# Patient Record
Sex: FEMALE | Race: WHITE | HISPANIC OR LATINO | Employment: FULL TIME | URBAN - METROPOLITAN AREA
[De-identification: names, ages, dates, MRNs, and addresses within clinical notes are randomized per-mention and may not be internally consistent; named-entity substitution may affect disease eponyms.]

---

## 2017-01-09 ENCOUNTER — CLINICAL SUPPORT (OUTPATIENT)
Dept: SPEECH THERAPY | Facility: HOSPITAL | Age: 27
End: 2017-01-09
Attending: STUDENT IN AN ORGANIZED HEALTH CARE EDUCATION/TRAINING PROGRAM
Payer: MEDICAID

## 2017-01-09 DIAGNOSIS — R53.1 LEFT-SIDED WEAKNESS: ICD-10-CM

## 2017-01-09 DIAGNOSIS — R26.89 BALANCE PROBLEMS: ICD-10-CM

## 2017-01-09 PROCEDURE — 97110 THERAPEUTIC EXERCISES: CPT

## 2017-01-09 NOTE — PROGRESS NOTES
TIME RECORD    Date:  01/09/2017    Start Time:  9:00  Stop Time:  10:05    PROCEDURES:    TIMED  Procedure Min.   Bike 10   TE 25   Neuro 15   Gait 15         UNTIMED  Procedure Min.             Total Timed Minutes:  55  Total Timed Units:  4  Total Untimed Units:  0  Charges Billed/# of units:  4TE      Progress/Current Status    Subjective:     Patient ID: Kadie Mccann is a 26 y.o. female.  Diagnosis:   1. Left-sided weakness     2. Balance problems       Pain: pt reports no complaints of pain, she states she has weakness and numbness in Left lateral hip and knee today. She is agreeable to PT session.     Objective:   Pt initiated session on stationary cycle x 10 min under PTA supervision. Pt then completed therex as per logged below 1;1 W/ PTA. Pt then instructed on neuro re ed x 15 min in standing in //bars: foam balance beam with FWD/LAT x 5 trials with use of 1 UE for balance only, pt then escorted to her vehicle due to reports of general fatigue: ambulated down 35 ft ramp with use of SPC, able to negotiate a curb with no LOB approx 200 ft total distance ambulated.   Date  1/9/17 12/28/16 12/23/16   VISIT 4 3 2   POC 2/19/17      MT - -- --   Long Sit HS - -- --   Gastroc Str.  -- --   Nu step  Bike   10'  Bike   10'  L3   10'    Bridges 2x12 B 2x10  2x10    SLR 2x12 B 2x10  2x10    SL Hip Add 2x12 B 2x10  2x10    SL Hip Abd 2x12 B 2x10  2x10    LAQs  -- --   Seated Hip Flex  -- --   Cybex   Leg Press 4.0   2x10    2.0 uni  2x12 4.0   2x10  --   TKE - -- --   Heel/toe raises  - -- --   Mini squats  2x10 -- 2x10    Hip abd - -- --   Hip Flex - -- --   Hip Ext - -- --   HS Curls - -- --   Knee Ext - -- --   Step Ups - -- --   Step Downs - -- --   Gait note note note   Balance  note note Note    Initials JA 1/6 MNB MNB       Assessment:     Pt completed today's session with no adverse reactions to session. She expressed feeling fatigued throughout standing activities requiring seated rest breaks.  Noted  some lateral swaying while returning to vehicle with use of SPC, but no LOB.     Patient Education/Response:   Cont HEP    Plans and Goals:   Pt to cont PT as per POC, advance standing balance activities as appropriate.   Short Term Goals= Long term goals (8 Weeks):    1. Independent with initial HEP.  2. Pt will score greater than or equal to 20/24 on the DGI test/assessment without use of AD placing patient in 10-20% impaired, limited, or restricted category demonstrating overall improved functional mobility and balance. [place] [place]  3. Pt will be able to safely perform and tolerate high level ADL's without LOB.   4. Able to ambulate community distances without use of assistive device without pain or gait deficits.  5. Pt will have MMT score of 5/5 in all major ms groups in L LE.  6. Pt will ambulate on TM x 10 minutes without use of UE support with 0 LOB at 2.0 mph with minimal gait deviations without LOB.  7. Pt will report 34% on the FOTO Functional Assessment placing the patient in the 20-40% impaired, limited, or restricted category indicating increased functional balance and mobility.

## 2017-01-11 ENCOUNTER — CLINICAL SUPPORT (OUTPATIENT)
Dept: SPEECH THERAPY | Facility: HOSPITAL | Age: 27
End: 2017-01-11
Attending: STUDENT IN AN ORGANIZED HEALTH CARE EDUCATION/TRAINING PROGRAM
Payer: MEDICAID

## 2017-01-11 DIAGNOSIS — R53.1 LEFT-SIDED WEAKNESS: ICD-10-CM

## 2017-01-11 DIAGNOSIS — R26.89 BALANCE PROBLEMS: ICD-10-CM

## 2017-01-11 PROCEDURE — 97110 THERAPEUTIC EXERCISES: CPT

## 2017-01-11 NOTE — PROGRESS NOTES
TIME RECORD    Date:  01/11/2017    Start Time:  9:02  Stop Time:  10:00    PROCEDURES:    TIMED  Procedure Min.   nustep 8   TE 20   Neuro Re Ed 25             UNTIMED  Procedure Min.             Total Timed Minutes:  53  Total Timed Units:  4  Total Untimed Units:  0  Charges Billed/# of units:  4 TE      Progress/Current Status    Subjective:     Patient ID: Kadie Mccann is a 26 y.o. female.  Diagnosis:   1. Left-sided weakness     2. Balance problems       Pain: pt agreeable to PT session. She reports no complaints of pain today.     Objective:     Pt initiated session on Nustep x 8 min for Pt then completed therex as per logged below 1;1 W/ PTA. Pt then instructed on neuro re ed x 25 min in standing in //bars: foam balance beam with FWD/LAT x 5 trials with use of 1 UE for balance only, 3 cone taps with use of 1 UE for balance x 5 trials B LE's each.   Date  1/11/17 1/9/17 12/28/16 12/23/16   VISIT 5 4 3 2   POC 2/19/17       MT - - -- --   Long Sit HS - - -- --   Gastroc Str.   -- --   Nu step  Nu step  8 min  Bike   10'  Bike   10'  L3   10'    Bridges 2x15 B 2x12 B 2x10  2x10    SLR 1# 2x10 B 2x12 B 2x10  2x10    SL Hip Add 1# 2x10 B 2x12 B 2x10  2x10    SL Hip Abd 1# 2x10 2x12 B 2x10  2x10    LAQs -  -- --   Seated Hip Flex -  -- --   Cybex   Leg Press 4.0   2x10    2.0 uni  2x12 4.0   2x10    2.0 uni  2x12 4.0   2x10  --   TKE  - -- --   Heel/toe raises   - -- --   Mini squats  2x10 2x10 -- 2x10    Hip abd - - -- --   Hip Flex - - -- --   Hip Ext - - -- --   HS Curls - - -- --   Knee Ext 15# 2x10 - -- --   Step Ups Blue x10  Fwd/ Lat - -- --   Step Downs - - -- --   Gait Note note note note   Balance  note note note Note    Initials JA 2/6 JA 1/6 MNB MNB     Assessment:     Pt completed session with no reports of pain. No episodes of increased fatigue or exhaustion throughout session.    Patient Education/Response:     Cont HEP    Plans and Goals:     Cont PT as per POC  Short Term Goals= Long term  goals (8 Weeks):    1. Independent with initial HEP.  2. Pt will score greater than or equal to 20/24 on the DGI test/assessment without use of AD placing patient in 10-20% impaired, limited, or restricted category demonstrating overall improved functional mobility and balance. (ode goal mobility CI)  3. Pt will be able to safely perform and tolerate high level ADL's without LOB.   4. Able to ambulate community distances without use of assistive device without pain or gait deficits.  5. Pt will have MMT score of 5/5 in all major ms groups in L LE.  6. Pt will ambulate on TM x 10 minutes without use of UE support with 0 LOB at 2.0 mph with minimal gait deviations without LOB.  7. Pt will report 34% on the FOTO Functional Assessment placing the patient in the 20-40% impaired, limited, or restricted category indicating increased functional balance and mobility.

## 2017-01-16 ENCOUNTER — TELEPHONE (OUTPATIENT)
Dept: SPEECH THERAPY | Facility: HOSPITAL | Age: 27
End: 2017-01-16

## 2017-01-18 ENCOUNTER — CLINICAL SUPPORT (OUTPATIENT)
Dept: SPEECH THERAPY | Facility: HOSPITAL | Age: 27
End: 2017-01-18
Attending: STUDENT IN AN ORGANIZED HEALTH CARE EDUCATION/TRAINING PROGRAM
Payer: MEDICAID

## 2017-01-18 DIAGNOSIS — R53.1 LEFT-SIDED WEAKNESS: ICD-10-CM

## 2017-01-18 DIAGNOSIS — R26.89 BALANCE PROBLEMS: ICD-10-CM

## 2017-01-18 PROCEDURE — 97110 THERAPEUTIC EXERCISES: CPT

## 2017-01-18 NOTE — PROGRESS NOTES
"TIME RECORD    Date:  01/18/2017    Start Time:  9:00  Stop Time:  10:05    PROCEDURES:    TIMED  Procedure Min.   Nu step 8 min    TE 25   Neuro 28             UNTIMED  Procedure Min.             Total Timed Minutes:  53  Total Timed Units:  4  Total Untimed Units:  0  Charges Billed/# of units:  4TE      Progress/Current Status    Subjective:     Patient ID: Kadie Mccann is a 26 y.o. female.  Diagnosis:   1. Left-sided weakness     2. Balance problems       Pain: 1 /10  Pt reports she fell over the weekend while pumping gas. She states she hurt her R knee, but is in minimal ache today.     Objective:     Pt initiated session on Nustep x 8 min for Pt then completed therex as per logged below 1;1 W/ PTA. Pt then instructed on neuro re ed x 28 min in standing in //bars: foam balance beam with FWD/LAT x 5 trials with use of 1 UE for balance only, 3 cone taps with use of 1 UE for balance x 5 trials B LE's each.     Date  1/18/17 1/11/17 1/9/17 12/28/16 12/23/16   VISIT 6 5 4 3 2   POC 2/19/17        MT - - - -- --   Long Sit HS - - - -- --   Gastroc Str. -   -- --   Nu step  Nu step  8 min  Nu step  8 min  Bike   10'  Bike   10'  L3   10'    Hip ext 1" 2x10 b       Bridges 2x15 2x15 B 2x12 B 2x10  2x10    SLR 1# 2x10 B 1# 2x10 B 2x12 B 2x10  2x10    SL Hip Add 1# 2x10 B 1# 2x10 B 2x12 B 2x10  2x10    SL Hip Abd 1# 2x10 B 1# 2x10 2x12 B 2x10  2x10    LAQs  -  -- --   Seated Hip Flex  -  -- --   Cybex   Leg Press 4.0   3x10    2.5  2x15  4.0   2x10    2.0 uni  2x12 4.0   2x10    2.0 uni  2x12 4.0   2x10  --   TKE   - -- --   Heel/toe raises    - -- --   Mini squats  2x10 2x10 2x10 -- 2x10    Hip abd  - - -- --   Hip Flex  - - -- --   Hip Ext  - - -- --   HS Curls  - - -- --   Knee Ext 15# 2x12 15# 2x10 - -- --   Step Ups Blue x10  Fwd/ Lat Blue x10  Fwd/ Lat - -- --   Step Downs  - - -- --   Gait Note Note note note note   Balance  Note note note note Note    Initials KARYN 3/6 KARYN 2/6 KARYN 1/6 MNB MNB       Assessment: "     Pt reports she has no new complaints post session. She expressed concerns about her condition and limitations with ambulation and balance. She required verbal encouragement to motivate during session today. Pt cooperative overall, some verbal instruction on postural awareness.     Patient Education/Response:     Cont HEP    Plans and Goals:     Cont PT to advance as appropriate  Short Term Goals= Long term goals (8 Weeks):    1. Independent with initial HEP.  2. Pt will score greater than or equal to 20/24 on the DGI test/assessment without use of AD placing patient in 10-20% impaired, limited, or restricted category demonstrating overall improved functional mobility and balance. ([facility] [place]  3. Pt will be able to safely perform and tolerate high level ADL's without LOB.   4. Able to ambulate community distances without use of assistive device without pain or gait deficits.  5. Pt will have MMT score of 5/5 in all major ms groups in L LE.  6. Pt will ambulate on TM x 10 minutes without use of UE support with 0 LOB at 2.0 mph with minimal gait deviations without LOB.  7. Pt will report 34% on the FOTO Functional Assessment placing the patient in the 20-40% impaired, limited, or restricted category indicating increased functional balance and mobility.

## 2017-01-19 ENCOUNTER — OFFICE VISIT (OUTPATIENT)
Dept: FAMILY MEDICINE | Facility: HOSPITAL | Age: 27
End: 2017-01-19
Payer: MEDICAID

## 2017-01-19 VITALS — DIASTOLIC BLOOD PRESSURE: 75 MMHG | RESPIRATION RATE: 20 BRPM | SYSTOLIC BLOOD PRESSURE: 114 MMHG | HEART RATE: 97 BPM

## 2017-01-19 DIAGNOSIS — J06.9 UPPER RESPIRATORY TRACT INFECTION, UNSPECIFIED TYPE: Primary | ICD-10-CM

## 2017-01-19 DIAGNOSIS — C71.9 ASTROCYTOMA, GRADE II: ICD-10-CM

## 2017-01-19 PROCEDURE — 99213 OFFICE O/P EST LOW 20 MIN: CPT | Performed by: STUDENT IN AN ORGANIZED HEALTH CARE EDUCATION/TRAINING PROGRAM

## 2017-01-19 RX ORDER — AZITHROMYCIN 250 MG/1
TABLET, FILM COATED ORAL
Qty: 5 TABLET | Refills: 0 | Status: SHIPPED | OUTPATIENT
Start: 2017-01-23 | End: 2017-01-29

## 2017-01-19 NOTE — PROGRESS NOTES
Subjective:       Patient ID: Kadie Mccann is a 26 y.o. female.    Chief Complaint: URI    URI    This is a new problem. The current episode started in the past 7 days. The problem has been unchanged. There has been no fever. Associated symptoms include congestion, coughing, headaches (maxiallry tenderness), rhinorrhea and sinus pain. Pertinent negatives include no abdominal pain, chest pain, ear pain, nausea, plugged ear sensation, sneezing, sore throat, swollen glands or wheezing. Treatments tried: EmergenC packets. The treatment provided no relief.     Review of Systems   Constitutional: Negative for activity change, appetite change, chills and fever.   HENT: Positive for congestion, postnasal drip, rhinorrhea and sinus pressure. Negative for ear pain, sneezing, sore throat and trouble swallowing.    Eyes: Negative for visual disturbance.   Respiratory: Positive for cough. Negative for shortness of breath and wheezing.    Cardiovascular: Negative for chest pain.   Gastrointestinal: Negative for abdominal pain and nausea.   Neurological: Positive for headaches (maxiallry tenderness).   All other systems reviewed and are negative.      Objective:        Vitals:    01/19/17 1416   BP: 114/75   Pulse: 97   Resp: 20   There is no height or weight on file to calculate BMI.      Physical Exam   Constitutional: She appears well-developed and well-nourished. No distress.   HENT:   Head: Normocephalic and atraumatic.   Right Ear: External ear normal.   Left Ear: External ear normal.   Nose: Mucosal edema and rhinorrhea present. Left sinus exhibits maxillary sinus tenderness.   Mouth/Throat: Mucous membranes are normal. No oropharyngeal exudate or posterior oropharyngeal erythema.   Cardiovascular: Normal rate and regular rhythm.    Pulmonary/Chest: Effort normal and breath sounds normal. No respiratory distress. She has no wheezes.   Nursing note and vitals reviewed.      Assessment:       1. Upper respiratory tract  infection, unspecified type    2. Astrocytoma, grade II        Plan:       Upper respiratory tract infection, unspecified type  -     azithromycin (ZITHROMAX Z-COREY) 250 MG tablet; Take two tablets on DAY 1. Then Take one table daily every day for 4 days  Dispense: 5 tablet; Refill: 0 (start on 01/23 if symptoms do not resolve)  - supportive care    Astrocytoma, grade II  - called MD Jhonatan as patient would like to receive a second opinion  - spoke to referral center: Referral [phone]  - will fax patient information with images, reports [facility] Fax: [phone] for review of her case to determine the eligibility of her referral      Return if symptoms worsen or fail to improve.

## 2017-01-25 ENCOUNTER — CLINICAL SUPPORT (OUTPATIENT)
Dept: SPEECH THERAPY | Facility: HOSPITAL | Age: 27
End: 2017-01-25
Attending: STUDENT IN AN ORGANIZED HEALTH CARE EDUCATION/TRAINING PROGRAM
Payer: MEDICAID

## 2017-01-25 DIAGNOSIS — R26.89 BALANCE PROBLEMS: ICD-10-CM

## 2017-01-25 DIAGNOSIS — R53.1 LEFT-SIDED WEAKNESS: ICD-10-CM

## 2017-01-25 PROCEDURE — 97110 THERAPEUTIC EXERCISES: CPT

## 2017-01-25 NOTE — PROGRESS NOTES
"TIME RECORD    Date:  01/25/2017    Start Time:  ***  Stop Time:  ***    PROCEDURES:    TIMED  Procedure Min.                         UNTIMED  Procedure Min.             Total Timed Minutes:  ***  Total Timed Units:  ***  Total Untimed Units:  ***  Charges Billed/# of units:  ***      Progress/Current Status    Subjective:     Patient ID: Kadie Mccann is a 26 y.o. female.  Diagnosis: No diagnosis found.  Pain: {PAIN 0-10:14197} /10  ***    Objective:     ***    Pt initiated session on Nustep x 8 min for Pt then completed therex as per logged below 1;1 W/ PTA. Pt then instructed on neuro re ed x 28 min in standing in //bars: foam balance beam with FWD/LAT x 5 trials with use of 1 UE for balance only, 3 cone taps with use of 1 UE for balance x 5 trials B LE's each.     Date  1/25/2017 1/18/17 1/11/17 1/9/17 12/28/16 12/23/16   VISIT 7 6 5 4 3 2   POC 2/19/17 --        MT -- - - - -- --   Long Sit HS -- - - - -- --   Gastroc Str. -- -   -- --   Nu step  Nu step  8 min  Nu step  8 min  Nu step  8 min  Bike   10'  Bike   10'  L3   10'    Hip ext 1" 2x10 b 1" 2x10 b       Bridges 2x15 2x15 2x15 B 2x12 B 2x10  2x10    SLR -- 1# 2x10 B 1# 2x10 B 2x12 B 2x10  2x10    SL Hip Add -- 1# 2x10 B 1# 2x10 B 2x12 B 2x10  2x10    SL Hip Abd -- 1# 2x10 B 1# 2x10 2x12 B 2x10  2x10    LAQs --  -  -- --   Seated Hip Flex --  -  -- --   Cybex   Leg Press  4.0   3x10    2.5  2x15  4.0   2x10    2.0 uni  2x12 4.0   2x10    2.0 uni  2x12 4.0   2x10  --   TKE    - -- --   Heel/toe raises     - -- --   Mini squats   2x10 2x10 2x10 -- 2x10    Hip abd 2 x 10 B  - - -- --   Hip Flex 2 x 10 B  - - -- --   Hip Ext 2 x 10 B RTC  - - -- --   HS Curls   - - -- --   Knee Ext  15# 2x12 15# 2x10 - -- --   Step Ups  Blue x10  Fwd/ Lat Blue x10  Fwd/ Lat - -- --   Step Downs   - - -- --   Gait  Note Note note note note   Balance   Note note note note Note    Initials  KARYN 3/6 KARYN 2/6 KARYN 1/6 MNB MNB       Assessment:     ***    Patient " Education/Response:     ***    Plans and Goals:       Cont PT to advance as appropriate  Short Term Goals= Long term goals (8 Weeks):    1. Independent with initial HEP.  2. Pt will score greater than or equal to 20/24 on the DGI test/assessment without use of AD placing patient in 10-20% impaired, limited, or restricted category demonstrating overall improved functional mobility and balance. ([facility] goal mobility CI)  3. Pt will be able to safely perform and tolerate high level ADL's without LOB.   4. Able to ambulate community distances without use of assistive device without pain or gait deficits.  5. Pt will have MMT score of 5/5 in all major ms groups in L LE.  6. Pt will ambulate on TM x 10 minutes without use of UE support with 0 LOB at 2.0 mph with minimal gait deviations without LOB.  7. Pt will report 34% on the FOTO Functional Assessment placing the patient in the 20-40% impaired, limited, or restricted category indicating increased functional balance and mobility.

## 2017-01-25 NOTE — PROGRESS NOTES
"TIME RECORD    Date:  01/25/2017    Start Time:  900  Stop Time:  1000    PROCEDURES:    TIMED  Procedure Min.   Nu step 8 min    TE 25   Neuro 25             UNTIMED  Procedure Min.             Total Timed Minutes:  53  Total Timed Units:  4  Total Untimed Units:  0  Charges Billed/# of units:  4TE      Progress/Current Status    Subjective:     Patient ID: Kadie Mccann is a 26 y.o. female.  Diagnosis:   1. Left-sided weakness     2. Balance problems       Pain: Patient reports some R knee soreness.   Objective:     Pt initiated session on Nustep x 8 min for endurance training with B UE/LE extremities for reciprocal motion of all limbs. Patient then completed therex as per logged below 1:1 W/ PTA. Pt then performed balance training min in standing in //bars: foam balance beam with FWD x 6  and LAT x 4 laps  with use of 1 UE for balance only, 3 cone taps with use of 1 UE for balance 2x 5  B LE's each, step to over 4 hurdles swapping leading foot x4 laps and sidestepping x 4 laps.    Date  1/25/17 1/18/17 1/11/17 1/9/17 12/28/16 12/23/16   VISIT 7 6 5 4 3 2   POC 2/19/17         MT - - - - -- --   Long Sit HS -- - - - -- --   Gastroc Str. - -   -- --   Nu step  Nu step  8 koki Nu step  8 min  Nu step  8 min  Bike   10'  Bike   10'  L3   10'    Hip ext  1" 2x10 b       Bridges 2x15 2x15 2x15 B 2x12 B 2x10  2x10    SLR - 1# 2x10 B 1# 2x10 B 2x12 B 2x10  2x10    SL Hip Add - 1# 2x10 B 1# 2x10 B 2x12 B 2x10  2x10    SL Hip Abd - 1# 2x10 B 1# 2x10 2x12 B 2x10  2x10    LAQs -  -  -- --   Seated Hip Flex   -  -- --   Cybex   Leg Press 4.0  2x15    2.5  2x15 4.0   3x10    2.5  2x15  4.0   2x10    2.0 uni  2x12 4.0   2x10    2.0 uni  2x12 4.0   2x10  --   TKE -   - -- --   Heel/toe raises  -   - -- --   Mini squats  NT 2x10 2x10 2x10 -- 2x10    Hip abd RTC 2x10 //  - - -- --   Hip Flex RTC  2x10 //  - - -- --   Hip Ext RTC   2x10 //  - - -- --   HS Curls   - - -- --   Knee Ext 15# 3x10 15# 2x12 15# 2x10 - -- --   Step " Ups Blue 2x10 ea fwd/lat Blue x10  Fwd/ Lat Blue x10  Fwd/ Lat - -- --   Step Downs   - - -- --   Gait  Note Note note note note   Balance  note Note note note note Note    Initials CS 4/6 JA 3/6 JA 2/6 JA 1/6 MNB MNB       Assessment:      Patient required verbal and tactile cueing for posture during session.  Patient fatigued at end of session.    Patient Education/Response:     Cont HEP    Plans and Goals:     Cont PT to advance as appropriate  Short Term Goals= Long term goals (8 Weeks):    1. Independent with initial HEP.  2. Pt will score greater than or equal to 20/24 on the DGI test/assessment without use of AD placing patient in 10-20% impaired, limited, or restricted category demonstrating overall improved functional mobility and balance. (ode goal mobility CI)  3. Pt will be able to safely perform and tolerate high level ADL's without LOB.   4. Able to ambulate community distances without use of assistive device without pain or gait deficits.  5. Pt will have MMT score of 5/5 in all major ms groups in L LE.  6. Pt will ambulate on TM x 10 minutes without use of UE support with 0 LOB at 2.0 mph with minimal gait deviations without LOB.  7. Pt will report 34% on the FOTO Functional Assessment placing the patient in the 20-40% impaired, limited, or restricted category indicating increased functional balance and mobility.

## 2017-02-01 ENCOUNTER — CLINICAL SUPPORT (OUTPATIENT)
Dept: SPEECH THERAPY | Facility: HOSPITAL | Age: 27
End: 2017-02-01
Attending: STUDENT IN AN ORGANIZED HEALTH CARE EDUCATION/TRAINING PROGRAM
Payer: MEDICAID

## 2017-02-01 DIAGNOSIS — R26.89 BALANCE PROBLEMS: ICD-10-CM

## 2017-02-01 DIAGNOSIS — R53.1 LEFT-SIDED WEAKNESS: ICD-10-CM

## 2017-02-01 PROCEDURE — 97110 THERAPEUTIC EXERCISES: CPT

## 2017-02-01 NOTE — PROGRESS NOTES
"TIME RECORD    Date:  02/01/2017    Start Time:  900  Stop Time:  1000    PROCEDURES:    TIMED  Procedure Min.   NuStep 9   Therex 25   NMR 20     Total Timed Minutes:  59  Total Timed Units:  4  Total Untimed Units:  -  Charges Billed/# of units:  4 TE      Progress/Current Status    Subjective:     Patient ID: Kadie Mccann is a 26 y.o. female.  Diagnosis:   1. Left-sided weakness     2. Balance problems       Pain: 3 /10  Reports left leg pain and very tired today.  "I did a lot of walking yesterday - I'm not sure why."    Objective:     Pt initiated session on Nustep S=6, L 4 x 9 min for endurance training with B UE/LE extremities for reciprocal motion of all limbs. Patient then completed therex as per logged below 1:1 W/ PTA with seated rest break after standing therex. Pt then performed balance training 15 min in standing in //bars: foam balance beam with FWD x 6  and LAT x 4 laps  with use of 1 UE for balance only. Trial static stand on foam with NBOS w/o UE support 30"x2, SLS 2x 30 on R, 2x20 on L with one UE support. Then NBOS with head turns R<>L, then up/down x 10 each.    Date  2/1/17 1/25/17 1/18/17 1/11/17 1/9/17 12/28/16 12/23/16   VISIT 8 7 6 5 4 3 2   POC 2/19/17          MT -- - - - - -- --   Long Sit HS -- -- - - - -- --   Gastroc Str. -- - -   -- --   Nu step  S=6, L4  9 min Nu step  8 koki Nu step  8 min  Nu step  8 min  Bike   10'  Bike   10'  L3   10'    Hip ext   1" 2x10 b       Bridges 2x15 2x15 2x15 2x15 B 2x12 B 2x10  2x10    SLR  - 1# 2x10 B 1# 2x10 B 2x12 B 2x10  2x10    SL Hip Add  - 1# 2x10 B 1# 2x10 B 2x12 B 2x10  2x10    SL Hip Abd  - 1# 2x10 B 1# 2x10 2x12 B 2x10  2x10    LAQs  -  -  -- --   Seated Hip Flex    -  -- --   Cybex   Leg Press 4.5   2x10    3.0  2x10 U 4.0  2x15    2.5  2x15 4.0   3x10    2.5  2x15  4.0   2x10    2.0 uni  2x12 4.0   2x10    2.0 uni  2x12 4.0   2x10  --   TKE  -   - -- --   Heel/toe raises   -   - -- --   Mini squats  1x10  NT 2x10 2x10 2x10 -- 2x10  "   Hip abd RTC // 2x12 B RTC 2x10 //  - - -- --   Hip Flex RTC //  2x12 B RTC  2x10 //  - - -- --   Hip Ext RTC //  2x12 B RTC   2x10 //  - - -- --   HS Curls    - - -- --   Knee Ext NT 15# 3x10 15# 2x12 15# 2x10 - -- --   Step Ups oot Blue 2x10 ea fwd/lat Blue x10  Fwd/ Lat Blue x10  Fwd/ Lat - -- --   Step Downs    - - -- --   Gait   Note Note note note note   Balance  Note note Note note note note Note    Initials DH 5/6 CS 4/6 JA 3/6 JA 2/6 JA 1/6 MNB MNB       Assessment:     Treatment slightly limited by patient complaints of fatigue and needing to take rest breaks.  Reports of left LE pain with attempts of SLS on same.  Able to tolerate with shorter time.    Patient Education/Response:     Patient educated to continue HEP.  Verbalized understanding.    Plans and Goals:     Cont PT per POC to advance as appropriate.    Short Term Goals= Long term goals (8 Weeks):    1. Independent with initial HEP.  2. Pt will score greater than or equal to 20/24 on the DGI test/assessment without use of AD placing patient in 10-20% impaired, limited, or restricted category demonstrating overall improved functional mobility and balance. (ode goal mobility CI)  3. Pt will be able to safely perform and tolerate high level ADL's without LOB.   4. Able to ambulate community distances without use of assistive device without pain or gait deficits.  5. Pt will have MMT score of 5/5 in all major ms groups in L LE.  6. Pt will ambulate on TM x 10 minutes without use of UE support with 0 LOB at 2.0 mph with minimal gait deviations without LOB.  7. Pt will report 34% on the FOTO Functional Assessment placing the patient in the 20-40% impaired, limited, or restricted category indicating increased functional balance and mobility.

## 2017-02-15 ENCOUNTER — CLINICAL SUPPORT (OUTPATIENT)
Dept: REHABILITATION | Facility: HOSPITAL | Age: 27
End: 2017-02-15
Attending: STUDENT IN AN ORGANIZED HEALTH CARE EDUCATION/TRAINING PROGRAM
Payer: MEDICAID

## 2017-02-15 DIAGNOSIS — R53.1 LEFT-SIDED WEAKNESS: ICD-10-CM

## 2017-02-15 DIAGNOSIS — R26.89 BALANCE PROBLEMS: ICD-10-CM

## 2017-02-15 PROCEDURE — 97110 THERAPEUTIC EXERCISES: CPT | Mod: PN

## 2017-02-15 NOTE — PROGRESS NOTES
"TIME RECORD    Date:  02/15/2017    Start Time:  0900  Stop Time:  1000    PROCEDURES:    TIMED  Procedure Min.   NMR 20   TE 30   Gait 10             UNTIMED  Procedure Min.             Total Timed Minutes:  60  Total Timed Units:  4  Total Untimed Units:  0  Charges Billed/# of units:  4 TE      Progress/Current Status    Subjective:     Patient ID: Kadie Mccann is a 26 y.o. female.  Diagnosis:   1. Left-sided weakness     2. Balance problems       Pain: 0 /10  Pt stated that she still feels like her L side if still. SHe stated that she is doing HEP but she still feels like her balance needs improvement so that she can get completely of the cane in the community.       Objective:     Pt presented to session with SPC.  She performed Scifit per log x 10' for Neuro re-ed and endurance training with B UE/LE extremities for reciprocal motion of all limbs.  Pt performed TE per log x 30'.  Pt performed LE assessment and balance test and TM gait training x 10'.     Date  2/15/17 2/1/17 1/25/17 1/18/17 1/11/17 1/9/17 12/28/16 12/23/16   VISIT 9 8 7 6 5 4 3 2   POC 4/15/17           MT -- -- - - - - -- --   Long Sit HS 3 x 30'' B -- -- - - - -- --   Gastroc Str. 3 x 30'' on stairs -- - -   -- --   Nu step  S=12, L1.5  8 min S=6, L4  9 min Nu step  8 koki Nu step  8 min  Nu step  8 min  Bike   10'  Bike   10'  L3   10'    Hip ext --   1" 2x10 b       Bridges -- 2x15 2x15 2x15 2x15 B 2x12 B 2x10  2x10    SLR --  - 1# 2x10 B 1# 2x10 B 2x12 B 2x10  2x10    SL Hip Add --  - 1# 2x10 B 1# 2x10 B 2x12 B 2x10  2x10    SL Hip Abd --  - 1# 2x10 B 1# 2x10 2x12 B 2x10  2x10    LAQs --  -  -  -- --   Seated Hip Flex --    -  -- --   Cybex   Leg Press 4.5   2x12    3.0  2x12 U 4.5   2x10    3.0  2x10 U 4.0  2x15    2.5  2x15 4.0   3x10    2.5  2x15  4.0   2x10    2.0 uni  2x12 4.0   2x10    2.0 uni  2x12 4.0   2x10  --   TKE --  -   - -- --   Heel/toe raises  2 x 10 B  -   - -- --   Mini squats  2 x 10  1x10  NT 2x10 2x10 2x10 -- " 2x10    Hip abd GTC // 2x12 B RTC // 2x12 B RTC 2x10 //  - - -- --   Hip Flex GTC // 2x12 B RTC //  2x12 B RTC  2x10 //  - - -- --   Hip Ext GTC // 2x12 B RTC //  2x12 B RTC   2x10 //  - - -- --   HS Curls --    - - -- --   Knee Ext NT NT 15# 3x10 15# 2x12 15# 2x10 - -- --   Step Ups NT oot Blue 2x10 ea fwd/lat Blue x10  Fwd/ Lat Blue x10  Fwd/ Lat - -- --   Step Downs NT    - - -- --   Gait On TM   Note Note note note note   Balance  See DGI Note note Note note note note Note    Initials FS DH 5/6 CS 4/6 JA 3/6 JA 2/6 JA 1/6 MNB MNB       Dynamic Gait Index - assessed without AD  1. Gait level surface -  2  2. Change in gait speed - 3  3. Gait with horizontal head turns - 3  4. Gait with vertical head turns - 2  5. Gait and pivot turn - 2  6. Step over obstacle - 3  7. Step around obstacle - 3  8. Steps - 2  Total 20/24    Lower Extremity Strength    RLE LLE   Hip Flexion: 5/5 5/5   Hip Extension:  5/5 5/5   Hip Abduction: 5/5 5/5   Knee Extension: 5/5 5/5   Knee Flexion: 5/5 5/5   Ankle Dorsiflexion: 5/5 5/5   Ankle Plantarflexion: 5/5 5/5       Assessment:     Pt has met some goals but is still using cane over 25% of the time and has decreased tolerance walking.  PT will update POC, see updated assessment.     Patient Education/Response:     CONT HEP with increase to GTB for TB exercises.    Plans and Goals:     SEE updated POC   Short Term Goals= Long term goals (8 Weeks):    1. Independent with initial HEP.  2. Pt will score greater than or equal to 20/24 on the DGI test/assessment without use of AD placing patient in 10-20% impaired, limited, or restricted category demonstrating overall improved functional mobility and balance. (Gcode goal mobility CI) MET without AD  3. Pt will be able to safely perform and tolerate high level ADL's without LOB. progressing  4. Able to ambulate community distances without use of assistive device without pain or gait deficits. NOT met still using SPC 25-40% of the time  5. Pt  will have MMT score of 5/5 in all major ms groups in L LE. MET  6. Pt will ambulate on TM x 10 minutes without use of UE support with 0 LOB at 2.0 mph with minimal gait deviations without LOB. Not met but progressing   7. Pt will report 34% on the FOTO Functional Assessment placing the patient in the 20-40% impaired, limited, or restricted category indicating increased functional balance and mobility.

## 2017-02-20 NOTE — PLAN OF CARE
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 Left-sided weakness     2. Balance problems       Treatment Diagnosis:  NF type 1, balance problems and weakness  Certification Period:  2/15/17 to 4/15/17  Precautions:  none  Visits from SOC:  10  Functional Level Prior to SOC:  Pt using SPC 75% of the time with frequent falls and LOB.  B LE weakness and decreased walking endurance.      Updated Assessment:  Pt has improved in strength,balance and overall function seen below. SHe has met most of her goals and is using her AD less with less falls.  She would cont to benefit from more PT to eliminate use of AD and to improve balance and walking tolerance.     Dynamic Gait Index - assessed without AD  1. Gait level surface -  2  2. Change in gait speed - 3  3. Gait with horizontal head turns - 3  4. Gait with vertical head turns - 2  5. Gait and pivot turn - 2  6. Step over obstacle - 3  7. Step around obstacle - 3  8. Steps - 2  Total 20/24    Lower Extremity Strength    RLE LLE   Hip Flexion: 5/5 5/5   Hip Extension:  5/5 5/5   Hip Abduction: 5/5 5/5   Knee Extension: 5/5 5/5   Knee Flexion: 5/5 5/5   Ankle Dorsiflexion: 5/5 5/5   Ankle Plantarflexion: 5/5 5/5       Previous Short Term Goals Status:     1. Independent with initial HEP.  2. Pt will score greater than or equal to 20/24 on the DGI test/assessment without use of AD placing patient in 10-20% impaired, limited, or restricted category demonstrating overall improved functional mobility and balance. (ode goal mobility CI) MET without AD  3. Pt will be able to safely perform and tolerate high level ADL's without LOB. progressing  4. Able to ambulate community distances without use of assistive device without pain or gait deficits. NOT met still using SPC 25-40% of the time  5. Pt will have MMT score of 5/5 in all major ms groups in L LE.  MET  6. Pt will ambulate on TM x 10 minutes without use of UE support with 0 LOB at 2.0 mph with minimal gait deviations without LOB. Not met but progressing   7. Pt will report 34% on the FOTO Functional Assessment placing the patient in the 20-40% impaired, limited, or restricted category indicating increased functional balance and mobility.  New Short Term Goals Status:   none  Long Term Goal Status:   continue per initial plan of care, STG's= LTGs  Reasons for Recertification of Therapy:   Expiration of prescription, end of POC script    Certification Period: 2/15/17 to 4/15/17  Recommended Treatment Plan: 1-2 times per week for 8 weeks: Gait Training, Locomotor Training, Manual Therapy, Moist Heat/ Ice, Neuromuscular Re-ed, Patient Education, Self Care, Therapeutic Activites and Therapeutic Exercise, modalities prn, ASTYM prn, kinesiotape prn, Functional Dry Needling prn     Other Recommendations: Pt would benefit from OT for UE coordination impairments and ataxia.        Therapist's Name: Mila Beard DPT   Date: 02/20/2017    I CERTIFY THE NEED FOR THESE SERVICES FURNISHED UNDER THIS PLAN OF TREATMENT AND WHILE UNDER MY CARE    Physician's comments: ________________________________________________________________________________________________________________________________________________      Physician's Name: ___________________________________

## 2017-02-24 ENCOUNTER — CLINICAL SUPPORT (OUTPATIENT)
Dept: REHABILITATION | Facility: HOSPITAL | Age: 27
End: 2017-02-24
Attending: STUDENT IN AN ORGANIZED HEALTH CARE EDUCATION/TRAINING PROGRAM
Payer: MEDICAID

## 2017-02-24 DIAGNOSIS — R26.89 BALANCE PROBLEMS: ICD-10-CM

## 2017-02-24 DIAGNOSIS — R53.1 LEFT-SIDED WEAKNESS: ICD-10-CM

## 2017-02-24 PROCEDURE — 97110 THERAPEUTIC EXERCISES: CPT | Mod: PN

## 2017-02-24 NOTE — PROGRESS NOTES
"TIME RECORD    Date:  02/24/2017    Start Time:  0850  Stop Time:  0948    PROCEDURES:    TIMED  Procedure Min.   TE 58                     UNTIMED  Procedure Min.             Total Timed Minutes:  58  Total Timed Units:  4  Total Untimed Units:  0  Charges Billed/# of units:  4 TE      Progress/Current Status    Subjective:     Patient ID: Kadie Mccann is a 26 y.o. female.  Diagnosis:   1. Left-sided weakness     2. Balance problems       Pain: 0 /10  Pt stated that she has been hearing a clicking sound in her hears     Objective:     Pt presented to session with SPC.  She performed Scifit per log x 10' for Neuro re-ed and endurance training with B UE/LE extremities for reciprocal motion of all limbs. Pt performed standing balance training including dot mat target stepping on command x 5', knee slap marching 3 x 20'.  PT traced pt's L foot for pee-lite fabrication insert for L shoe to decrease L LE ataxia and increase L foot proprioception. Pt performed gait training on TM x 12': 5' forward and 0.8mph, 2.5 minutes side and back at 0.4 mph all with UE support.  Pt performed TE per log x 20'.      Date  2/24/17 2/15/17 2/1/17 1/25/17 1/18/17 1/11/17 1/9/17 12/28/16 12/23/16   VISIT 10 9 8 7 6 5 4 3 2   POC 4/15/17            MT  -- -- - - - - -- --   Long Sit HS  3 x 30'' B -- -- - - - -- --   Gastroc Str. 3 x 30'' on stairs 3 x 30'' on stairs -- - -   -- --   Nu step  S=12, L1.3  10 min S=12, L1.5  8 min S=6, L4  9 min Nu step  8 koki Nu step  8 min  Nu step  8 min  Bike   10'  Bike   10'  L3   10'    Hip ext -- --   1" 2x10 b       Bridges -- -- 2x15 2x15 2x15 2x15 B 2x12 B 2x10  2x10    SLR -- --  - 1# 2x10 B 1# 2x10 B 2x12 B 2x10  2x10    SL Hip Add -- --  - 1# 2x10 B 1# 2x10 B 2x12 B 2x10  2x10    SL Hip Abd -- --  - 1# 2x10 B 1# 2x10 2x12 B 2x10  2x10    LAQs -- --  -  -  -- --   Seated Hip Flex -- --    -  -- --   Cybex   Leg Press OOT 4.5   2x12    3.0  2x12 U 4.5   2x10    3.0  2x10 U " 4.0  2x15    2.5  2x15 4.0   3x10    2.5  2x15  4.0   2x10    2.0 uni  2x12 4.0   2x10    2.0 uni  2x12 4.0   2x10  --   TKE -- --  -   - -- --   Heel/toe raises  OOT 2 x 10 B  -   - -- --   Mini squats  OOT 2 x 10  1x10  NT 2x10 2x10 2x10 -- 2x10    Hip abd GTC // 2x12 B GTC // 2x12 B RTC // 2x12 B RTC 2x10 //  - - -- --   Hip Flex OOT GTC // 2x12 B RTC //  2x12 B RTC  2x10 //  - - -- --   Hip Ext OOT GTC // 2x12 B RTC //  2x12 B RTC   2x10 //  - - -- --   HS Curls -- --    - - -- --   Knee Ext NT NT NT 15# 3x10 15# 2x12 15# 2x10 - -- --   Step Ups NT NT oot Blue 2x10 ea fwd/lat Blue x10  Fwd/ Lat Blue x10  Fwd/ Lat - -- --   Step Downs NT NT    - - -- --   Gait On TM On TM   Note Note note note note   Balance  See note See DGI Note note Note note note note Note    Initials FS FS DH 5/6 CS 4/6 JA 3/6 JA 2/6 JA 1/6 MNB MNB       Assessment:     Pt needs continued balance and gait trainin    Patient Education/Response:     CONT HEP    Plans and Goals:     SEE updated POC, continue balance and gait training with strengthening  Short Term Goals= Long term goals (8 Weeks):    1. Independent with initial HEP.  2. Pt will score greater than or equal to 20/24 on the DGI test/assessment without use of AD placing patient in 10-20% impaired, limited, or restricted category demonstrating overall improved functional mobility and balance. (Gcode goal mobility CI) MET without AD  3. Pt will be able to safely perform and tolerate high level ADL's without LOB. progressing  4. Able to ambulate community distances without use of assistive device without pain or gait deficits. NOT met still using SPC 25-40% of the time  5. Pt will have MMT score of 5/5 in all major ms groups in L LE. MET  6. Pt will ambulate on TM x 10 minutes without use of UE support with 0 LOB at 2.0 mph with minimal gait deviations without LOB. Not met but progressing   7. Pt will report 34% on the FOTO Functional Assessment placing the patient in the 20-40%  impaired, limited, or restricted category indicating increased functional balance and mobility.

## 2017-03-08 ENCOUNTER — CLINICAL SUPPORT (OUTPATIENT)
Dept: REHABILITATION | Facility: HOSPITAL | Age: 27
End: 2017-03-08
Attending: STUDENT IN AN ORGANIZED HEALTH CARE EDUCATION/TRAINING PROGRAM
Payer: MEDICAID

## 2017-03-08 DIAGNOSIS — R26.89 BALANCE PROBLEMS: ICD-10-CM

## 2017-03-08 DIAGNOSIS — R53.1 LEFT-SIDED WEAKNESS: ICD-10-CM

## 2017-03-08 PROCEDURE — 97110 THERAPEUTIC EXERCISES: CPT | Mod: PN

## 2017-03-08 NOTE — PROGRESS NOTES
"TIME RECORD    Date:  03/08/2017    Start Time:  0950  Stop Time:   1050    PROCEDURES:    TIMED  Procedure Min.   TE 60                     UNTIMED  Procedure Min.             Total Timed Minutes:  60  Total Timed Units:  4  Total Untimed Units:  0  Charges Billed/# of units:  4 TE      Progress/Current Status    Subjective:     Patient ID: Kadie Mccann is a 26 y.o. female.  Diagnosis:   1. Left-sided weakness     2. Balance problems       Pain: 0 /10  Pt stated that she was good today.      Objective:     Pt presented to session with SPC.  She performed Scifit per log x 10' for Neuro re-ed and endurance training with B UE/LE extremities for reciprocal motion of all limbs. Pt performed standing balance training including dot mat target stepping on command x 5', knee slap marching 3 x 20'. Pt performed TE per log x 20'. Pt performed gait training on TM x 12': 5' forward and 0.8mph with 1 UE support , 2.5 minutes side and back at 0.4 mph all with UE support.   Pt needed 3 seated rests throughout session. PT performed guarding due to L LE ataxia and dysmetria.    Date  3/8/17 2/24/17 2/15/17 2/1/17 1/25/17 1/18/17 1/11/17 1/9/17 12/28/16 12/23/16   VISIT 11 10 9 8 7 6 5 4 3 2   POC 4/15/17             MT --  -- -- - - - - -- --   Long Sit HS --  3 x 30'' B -- -- - - - -- --   Gastroc Str. NT 3 x 30'' on stairs 3 x 30'' on stairs -- - -   -- --   Nu step  S=10, L1.3  10 min S=12, L1.3  10 min S=12, L1.5  8 min S=6, L4  9 min Nu step  8 koki Nu step  8 min  Nu step  8 min  Bike   10'  Bike   10'  L3   10'    Hip ext -- -- --   1" 2x10 b       Bridges -- -- -- 2x15 2x15 2x15 2x15 B 2x12 B 2x10  2x10    SLR -- -- --  - 1# 2x10 B 1# 2x10 B 2x12 B 2x10  2x10    SL Hip Add -- -- --  - 1# 2x10 B 1# 2x10 B 2x12 B 2x10  2x10    SL Hip Abd -- -- --  - 1# 2x10 B 1# 2x10 2x12 B 2x10  2x10    LAQs -- -- --  -  -  -- --   Seated Hip Flex -- -- --    -  -- --   Cybex   Leg Press 4.5   2x12    3.0  2x12 U OOT 4.5 "   2x12    3.0  2x12 U 4.5   2x10    3.0  2x10 U 4.0  2x15    2.5  2x15 4.0   3x10    2.5  2x15  4.0   2x10    2.0 uni  2x12 4.0   2x10    2.0 uni  2x12 4.0   2x10  --   TKE -- -- --  -   - -- --   Heel/toe raises  2 x 10 B OOT 2 x 10 B  -   - -- --   Mini squats  2 x 10 without UE OOT 2 x 10  1x10  NT 2x10 2x10 2x10 -- 2x10    Hip abd GTC // 2x12 B GTC // 2x12 B GTC // 2x12 B RTC // 2x12 B RTC 2x10 //  - - -- --   Hip Flex GTC // 2x12 B OOT GTC // 2x12 B RTC //  2x12 B RTC  2x10 //  - - -- --   Hip Ext GTC // 2x12 B OOT GTC // 2x12 B RTC //  2x12 B RTC   2x10 //  - - -- --   HS Curls -- -- --    - - -- --   Knee Ext NT NT NT NT 15# 3x10 15# 2x12 15# 2x10 - -- --   Step Ups NT NT NT oot Blue 2x10 ea fwd/lat Blue x10  Fwd/ Lat Blue x10  Fwd/ Lat - -- --   Step Downs NT NT NT    - - -- --   Gait On TM On TM On TM   Note Note note note note   Balance  See note See note See DGI Note note Note note note note Note    Initials FS FS FS DH 5/6 CS 4/6 JA 3/6 JA 2/6 JA 1/6 MNB MNB       Assessment:     Pt presented to session without cane she stated she is feeling more and more secure without it.      Patient Education/Response:     CONT HEP    Plans and Goals:     SEE updated POC, continue balance and gait training with strengthening  Short Term Goals= Long term goals (8 Weeks):    1. Independent with initial HEP.  2. Pt will score greater than or equal to 20/24 on the DGI test/assessment without use of AD placing patient in 10-20% impaired, limited, or restricted category demonstrating overall improved functional mobility and balance. (Gcode goal mobility CI) MET without AD  3. Pt will be able to safely perform and tolerate high level ADL's without LOB. progressing  4. Able to ambulate community distances without use of assistive device without pain or gait deficits. NOT met still using SPC 25-40% of the time  5. Pt will have MMT score of 5/5 in all major ms groups in L LE. MET  6. Pt will ambulate on TM x 10 minutes without  use of UE support with 0 LOB at 2.0 mph with minimal gait deviations without LOB. Not met but progressing   7. Pt will report 34% on the FOTO Functional Assessment placing the patient in the 20-40% impaired, limited, or restricted category indicating increased functional balance and mobility.

## 2017-03-15 ENCOUNTER — CLINICAL SUPPORT (OUTPATIENT)
Dept: REHABILITATION | Facility: HOSPITAL | Age: 27
End: 2017-03-15
Attending: STUDENT IN AN ORGANIZED HEALTH CARE EDUCATION/TRAINING PROGRAM
Payer: MEDICAID

## 2017-03-15 DIAGNOSIS — R26.89 BALANCE PROBLEMS: ICD-10-CM

## 2017-03-15 DIAGNOSIS — R53.1 LEFT-SIDED WEAKNESS: ICD-10-CM

## 2017-03-15 PROCEDURE — 97110 THERAPEUTIC EXERCISES: CPT | Mod: PN

## 2017-03-15 NOTE — PROGRESS NOTES
TIME RECORD    Date:  03/15/2017    Start Time:  1000  Stop Time:   1100    PROCEDURES:    TIMED  Procedure Min.   NMR 30   Gait 30                 UNTIMED  Procedure Min.             Total Timed Minutes:  60  Total Timed Units:  4  Total Untimed Units:  0  Charges Billed/# of units:  4 TE      Progress/Current Status    Subjective:     Patient ID: Kadie Mccann is a 26 y.o. female.  Diagnosis:   1. Left-sided weakness     2. Balance problems       Pain: 0 /10  Pt stated that she has a minor fall this weekend chasing after her young son with no injury.  She stated that she was able to go to the mall and shop for an entire afternoon for the 1st time in a long time which was an improvement.      Objective:     Pt presented to session without SPC.  She performed Scifit per log x 30' for Neuro re-ed and endurance training with B UE/LE extremities for reciprocal motion of all limbs. While pt performed NMR on Sci-fit PT fabricated Dynamic foot insert for L shoe to replace insert in shoe, total fabrication time 30'.   After pt fit with insert she did walking trial x 5 lap in gym with insert in L shoe to increase L LE sensory input.  Pt expressed feeling of shoe filling full but no report of pain.  PT removed insert and pt walked 200 ft without insert and had 3 LOB and then insert redonned and pt walked 200 ft. Pt walked 300 extra feet to ensure comfort of wear with 0 LOB.  Session ended with hips 3 ways at end of session.  Pt had no AD entire session.      Date  3/15/17 3/8/17 2/24/17 2/15/17 2/1/17 1/25/17 1/18/17 1/11/17 1/9/17 12/28/16 12/23/16   VISIT 12 11 10 9 8 7 6 5 4 3 2   POC 4/15/17              MT -- --  -- -- - - - - -- --   Long Sit HS -- --  3 x 30'' B -- -- - - - -- --   Gastroc Str. NT NT 3 x 30'' on stairs 3 x 30'' on stairs -- - -   -- --   Nu step  S=10, L1.5  10 min f/b L1 x 20' S=10, L1.3  10 min S=12, L1.3  10 min S=12, L1.5  8 min S=6, L4  9 min Nu step  8 koki Nu step  8 min  Nu step  8 min   "Bike   10'  Bike   10'  L3   10'    Hip ext -- -- -- --   1" 2x10 b       Bridges -- -- -- -- 2x15 2x15 2x15 2x15 B 2x12 B 2x10  2x10    SLR -- -- -- --  - 1# 2x10 B 1# 2x10 B 2x12 B 2x10  2x10    SL Hip Add -- -- -- --  - 1# 2x10 B 1# 2x10 B 2x12 B 2x10  2x10    SL Hip Abd -- -- -- --  - 1# 2x10 B 1# 2x10 2x12 B 2x10  2x10    LAQs -- -- -- --  -  -  -- --   Seated Hip Flex -- -- -- --    -  -- --   Cybex   Leg Press NT 4.5   2x12    3.0  2x12 U OOT 4.5   2x12    3.0  2x12 U 4.5   2x10    3.0  2x10 U 4.0  2x15    2.5  2x15 4.0   3x10    2.5  2x15  4.0   2x10    2.0 uni  2x12 4.0   2x10    2.0 uni  2x12 4.0   2x10  --   TKE NT -- -- --  -   - -- --   Heel/toe raises  NT 2 x 10 B OOT 2 x 10 B  -   - -- --   Mini squats  NT 2 x 10 without UE OOT 2 x 10  1x10  NT 2x10 2x10 2x10 -- 2x10    Hip abd GTC // 2x12 B GTC // 2x12 B GTC // 2x12 B GTC // 2x12 B RTC // 2x12 B RTC 2x10 //  - - -- --   Hip Flex GTC // 2x12 B GTC // 2x12 B OOT GTC // 2x12 B RTC //  2x12 B RTC  2x10 //  - - -- --   Hip Ext GTC // 2x12 B GTC // 2x12 B OOT GTC // 2x12 B RTC //  2x12 B RTC   2x10 //  - - -- --   HS Curls -- -- -- --    - - -- --   Knee Ext NT NT NT NT NT 15# 3x10 15# 2x12 15# 2x10 - -- --   Step Ups NT NT NT NT oot Blue 2x10 ea fwd/lat Blue x10  Fwd/ Lat Blue x10  Fwd/ Lat - -- --   Step Downs NT NT NT NT    - - -- --   Gait See note On TM On TM On TM   Note Note note note note   Balance  -- See note See note See DGI Note note Note note note note Note    Initials FS FS FS FS DH 5/6 CS 4/6 JA 3/6 JA 2/6 JA 1/6 MNB MNB         Assessment:     Pt showed visibly improved gait pattern and stability in L LE with dynamic foot insert in shoe.  She even reported "it is making me walk better and my foot is not turning or going out to the side anymore".  Pt will continue to wear insert as long as it is not causing pain to improve L LE proprioception and sensory input during gait.     Patient Education/Response:     CONT HEP    Plans and Goals: "       SEE updated POC, continue balance and gait training with strengthening  Short Term Goals= Long term goals (8 Weeks):    1. Independent with initial HEP.  2. Pt will score greater than or equal to 20/24 on the DGI test/assessment without use of AD placing patient in 10-20% impaired, limited, or restricted category demonstrating overall improved functional mobility and balance. (Gcode goal mobility CI) MET without AD  3. Pt will be able to safely perform and tolerate high level ADL's without LOB. progressing  4. Able to ambulate community distances without use of assistive device without pain or gait deficits. NOT met still using SPC 25-40% of the time  5. Pt will have MMT score of 5/5 in all major ms groups in L LE. MET  6. Pt will ambulate on TM x 10 minutes without use of UE support with 0 LOB at 2.0 mph with minimal gait deviations without LOB. Not met but progressing   7. Pt will report 34% on the FOTO Functional Assessment placing the patient in the 20-40% impaired, limited, or restricted category indicating increased functional balance and mobility.

## 2017-03-24 ENCOUNTER — CLINICAL SUPPORT (OUTPATIENT)
Dept: REHABILITATION | Facility: HOSPITAL | Age: 27
End: 2017-03-24
Attending: STUDENT IN AN ORGANIZED HEALTH CARE EDUCATION/TRAINING PROGRAM
Payer: MEDICAID

## 2017-03-24 DIAGNOSIS — R26.89 BALANCE PROBLEMS: ICD-10-CM

## 2017-03-24 DIAGNOSIS — R53.1 LEFT-SIDED WEAKNESS: ICD-10-CM

## 2017-03-24 PROCEDURE — 97110 THERAPEUTIC EXERCISES: CPT | Mod: PN

## 2017-03-24 NOTE — PROGRESS NOTES
"TIME RECORD    Date:  03/24/2017    Start Time:  0900  Stop Time:  0953    PROCEDURES:    TIMED  Procedure Min.   TE 53                     UNTIMED  Procedure Min.             Total Timed Minutes:  53  Total Timed Units:  4  Total Untimed Units:  0  Charges Billed/# of units:  4 TE      Progress/Current Status    Subjective:     Patient ID: Kadie Mccann is a 26 y.o. female.  Diagnosis:   1. Left-sided weakness     2. Balance problems       Pain: 0 /10  Pt stated that she was feeling down today but ready to keep pushing forward.     Objective:     Session initated with gait training on TM x 11 minutes total (forward x 5' at 0.8 mph, side stepping and backward walking x 2 minutes each with 0.4 mph with UE support for all.  Pt performed developmental sequencing: including tall kneel with overhead lift x 10 and chops B x 10 ea way f/b 1/2 kneel with punches x 1' B, tall to 1/2 kneel transShe performed Scifit per log x 15' for Neuro re-ed and endurance training with B UE/LE extremities for reciprocal motion of all limbs.     Date  3/23/17 3/15/17 3/8/17 2/24/17 2/15/17 2/1/17 1/25/17 1/18/17 1/11/17 1/9/17 12/28/16 12/23/16   VISIT 13 12 11 10 9 8 7 6 5 4 3 2   POC 4/15/17 --              MT -- -- --  -- -- - - - - -- --   Long Sit HS -- -- --  3 x 30'' B -- -- - - - -- --   Gastroc Str. NT NT NT 3 x 30'' on stairs 3 x 30'' on stairs -- - -   -- --   Nu step  S=13, L1.5  15 min S=10, L1.5  10 min f/b L1 x 20' S=10, L1.3  10 min S=12, L1.3  10 min S=12, L1.5  8 min S=6, L4  9 min Nu step  8 koki Nu step  8 min  Nu step  8 min  Bike   10'  Bike   10'  L3   10'    Hip ext -- -- -- -- --   1" 2x10 b       Bridges -- -- -- -- -- 2x15 2x15 2x15 2x15 B 2x12 B 2x10  2x10    SLR -- -- -- -- --  - 1# 2x10 B 1# 2x10 B 2x12 B 2x10  2x10    SL Hip Add -- -- -- -- --  - 1# 2x10 B 1# 2x10 B 2x12 B 2x10  2x10    SL Hip Abd -- -- -- -- --  - 1# 2x10 B 1# 2x10 2x12 B 2x10  2x10    LAQs -- -- -- -- --  -  -  -- --   Seated Hip Flex -- " -- -- -- --    -  -- --   Cybex   Leg Press NT NT 4.5   2x12    3.0  2x12 U OOT 4.5   2x12    3.0  2x12 U 4.5   2x10    3.0  2x10 U 4.0  2x15    2.5  2x15 4.0   3x10    2.5  2x15  4.0   2x10    2.0 uni  2x12 4.0   2x10    2.0 uni  2x12 4.0   2x10  --   TKE NT NT -- -- --  -   - -- --   Heel/toe raises  NT NT 2 x 10 B OOT 2 x 10 B  -   - -- --   Mini squats  NT NT 2 x 10 without UE OOT 2 x 10  1x10  NT 2x10 2x10 2x10 -- 2x10    Hip abd NT GTC // 2x12 B GTC // 2x12 B GTC // 2x12 B GTC // 2x12 B RTC // 2x12 B RTC 2x10 //  - - -- --   Hip Flex NT GTC // 2x12 B GTC // 2x12 B OOT GTC // 2x12 B RTC //  2x12 B RTC  2x10 //  - - -- --   Hip Ext NT GTC // 2x12 B GTC // 2x12 B OOT GTC // 2x12 B RTC //  2x12 B RTC   2x10 //  - - -- --   HS Curls -- -- -- -- --    - - -- --   Knee Ext NT NT NT NT NT NT 15# 3x10 15# 2x12 15# 2x10 - -- --   Step Ups NT NT NT NT NT oot Blue 2x10 ea fwd/lat Blue x10  Fwd/ Lat Blue x10  Fwd/ Lat - -- --   Step Downs NT NT NT NT NT    - - -- --   Gait See note on TM See note On TM On TM On TM   Note Note note note note   Dev seq See note              Balance  -- -- See note See note See DGI Note note Note note note note Note    Initials  FS FS FS FS DH 5/6 CS 4/6 JA 3/6 JA 2/6 JA 1/6 MNB MNB       Assessment:     Pt tolerated session fairly well but fatigued quickly with new developmental sequencing training.    Patient Education/Response:     CONT HEP    Plans and Goals:     SEE updated POC, continue balance and gait training with strengthening  Short Term Goals= Long term goals (8 Weeks):    1. Independent with initial HEP.  2. Pt will score greater than or equal to 20/24 on the DGI test/assessment without use of AD placing patient in 10-20% impaired, limited, or restricted category demonstrating overall improved functional mobility and balance. (Gcode goal mobility CI) MET without AD  3. Pt will be able to safely perform and tolerate high level ADL's without LOB. progressing  4. Able to ambulate  community distances without use of assistive device without pain or gait deficits. NOT met still using SPC 25-40% of the time  5. Pt will have MMT score of 5/5 in all major ms groups in L LE. MET  6. Pt will ambulate on TM x 10 minutes without use of UE support with 0 LOB at 2.0 mph with minimal gait deviations without LOB. Not met but progressing   7. Pt will report 34% on the FOTO Functional Assessment placing the patient in the 20-40% impaired, limited, or restricted category indicating increased functional balance and mobility.

## 2017-03-28 ENCOUNTER — CLINICAL SUPPORT (OUTPATIENT)
Dept: REHABILITATION | Facility: HOSPITAL | Age: 27
End: 2017-03-28
Attending: STUDENT IN AN ORGANIZED HEALTH CARE EDUCATION/TRAINING PROGRAM
Payer: MEDICAID

## 2017-03-28 DIAGNOSIS — R53.1 LEFT-SIDED WEAKNESS: ICD-10-CM

## 2017-03-28 DIAGNOSIS — R26.89 BALANCE PROBLEMS: ICD-10-CM

## 2017-03-28 PROCEDURE — 97110 THERAPEUTIC EXERCISES: CPT | Mod: PN

## 2017-03-28 NOTE — PROGRESS NOTES
"TIME RECORD    Date:  03/28/2017    Start Time:  0855  Stop Time:  0950    PROCEDURES:    TIMED  Procedure Min.   TE 55                     UNTIMED  Procedure Min.             Total Timed Minutes:  55  Total Timed Units:  4  Total Untimed Units:  0  Charges Billed/# of units:  4 TE      Progress/Current Status    Subjective:     Patient ID: Kadie Mccann is a 26 y.o. female.  Diagnosis:   1. Left-sided weakness     2. Balance problems       Pain: 3 /10  Pt stated that her L quad was hurting the last 2 days.      Objective:       Session initated with gait training on TM x 12 minutes total (forward x 6' at 0.8 mph with 1 UE support, side stepping and backward walking x 2 minutes each with 0.4 mph with B UE support.  Pt performed physio ball sitting balance and trunk stability training with overhead lift x 20 and chops B x 10 ea way.  Pt performed TE per log x 30' with PT 1:1 including stretches in standing with stairs for UE support. She performed Scifit per log x 10' for Neuro re-ed and endurance training with B UE/LE extremities for reciprocal motion of all limbs.       Date  3/28/17 3/23/17 3/15/17 3/8/17 2/24/17 2/15/17 2/1/17 1/25/17 1/18/17 1/11/17 1/9/17 12/28/16 12/23/16   VISIT 14 13 12 11 10 9 8 7 6 5 4 3 2   POC 4/15/17 -- --              MT -- -- -- --  -- -- - - - - -- --   Quad str 2 x 30'' on stairs               Long Sit HS 2 x 30'' on stairs -- -- --  3 x 30'' B -- -- - - - -- --   Gastroc Str. 3 x 30'' on stairs NT NT NT 3 x 30'' on stairs 3 x 30'' on stairs -- - -   -- --   Nu step  S=13, L1.5  10 min S=13, L1.5  15 min S=10, L1.5  10 min f/b L1 x 20' S=10, L1.3  10 min S=12, L1.3  10 min S=12, L1.5  8 min S=6, L4  9 min Nu step  8 koki Nu step  8 min  Nu step  8 min  Bike   10'  Bike   10'  L3   10'    Hip ext -- -- -- -- -- --   1" 2x10 b       Bridges -- -- -- -- -- -- 2x15 2x15 2x15 2x15 B 2x12 B 2x10  2x10    SLR -- -- -- -- -- --  - 1# 2x10 B 1# 2x10 B 2x12 B 2x10  2x10    SL Hip Add -- " -- -- -- -- --  - 1# 2x10 B 1# 2x10 B 2x12 B 2x10  2x10    SL Hip Abd -- -- -- -- -- --  - 1# 2x10 B 1# 2x10 2x12 B 2x10  2x10    LAQs -- -- -- -- -- --  -  -  -- --   Balance on physioball See note -- -- -- -- --    -  -- --   Cybex   Leg Press next NT NT 4.5   2x12    3.0  2x12 U OOT 4.5   2x12    3.0  2x12 U 4.5   2x10    3.0  2x10 U 4.0  2x15    2.5  2x15 4.0   3x10    2.5  2x15  4.0   2x10    2.0 uni  2x12 4.0   2x10    2.0 uni  2x12 4.0   2x10  --   TKE Next with RTB on L NT NT -- -- --  -   - -- --   Heel/toe raises  2 x 15 B NT NT 2 x 10 B OOT 2 x 10 B  -   - -- --   Mini squats  2 x 10 without UE  NT NT 2 x 10 without UE OOT 2 x 10  1x10  NT 2x10 2x10 2x10 -- 2x10    Hip abd GTC // 2x12 B NT GTC // 2x12 B GTC // 2x12 B GTC // 2x12 B GTC // 2x12 B RTC // 2x12 B RTC 2x10 //  - - -- --   Hip Flex GTC // 2x12 B NT GTC // 2x12 B GTC // 2x12 B OOT GTC // 2x12 B RTC //  2x12 B RTC  2x10 //  - - -- --   Hip Ext GTC // 2x12 B NT GTC // 2x12 B GTC // 2x12 B OOT GTC // 2x12 B RTC //  2x12 B RTC   2x10 //  - - -- --   HS Curls -- -- -- -- -- --    - - -- --   Knee Ext NT NT NT NT NT NT NT 15# 3x10 15# 2x12 15# 2x10 - -- --   Step Ups NT NT NT NT NT NT oot Blue 2x10 ea fwd/lat Blue x10  Fwd/ Lat Blue x10  Fwd/ Lat - -- --   Step Downs NT NT NT NT NT NT    - - -- --   Gait See note TM See note on TM See note On TM On TM On TM   Note Note note note note   Dev seq NT See note              Balance  On TM -- -- See note See note See DGI Note note Note note note note Note    Initials FS  FS FS FS FS DH 5/6 CS 4/6 JA 3/6 JA 2/6 JA 1/6 MNB MNB     Assessment:     Pt had better session today with mixture of gait, dynamic sitting balance on ball.      Patient Education/Response:     CONT HEP    Plans and Goals:       SEE updated POC, continue balance and gait training with strengthening  Short Term Goals= Long term goals (8 Weeks):    1. Independent with initial HEP.  2. Pt will score greater than or equal to 20/24 on the DGI  test/assessment without use of AD placing patient in 10-20% impaired, limited, or restricted category demonstrating overall improved functional mobility and balance. (ode goal mobility CI) MET without AD  3. Pt will be able to safely perform and tolerate high level ADL's without LOB. progressing  4. Able to ambulate community distances without use of assistive device without pain or gait deficits. NOT met still using SPC 25-40% of the time  5. Pt will have MMT score of 5/5 in all major ms groups in L LE. MET  6. Pt will ambulate on TM x 10 minutes without use of UE support with 0 LOB at 2.0 mph with minimal gait deviations without LOB. Not met but progressing   7. Pt will report 34% on the FOTO Functional Assessment placing the patient in the 20-40% impaired, limited, or restricted category indicating increased functional balance and mobility.

## 2017-04-04 ENCOUNTER — OFFICE VISIT (OUTPATIENT)
Dept: FAMILY MEDICINE | Facility: HOSPITAL | Age: 27
End: 2017-04-04
Attending: FAMILY MEDICINE
Payer: MEDICAID

## 2017-04-04 VITALS
HEIGHT: 59 IN | BODY MASS INDEX: 29.42 KG/M2 | WEIGHT: 145.94 LBS | SYSTOLIC BLOOD PRESSURE: 113 MMHG | HEART RATE: 84 BPM | DIASTOLIC BLOOD PRESSURE: 77 MMHG

## 2017-04-04 DIAGNOSIS — R53.1 LEFT-SIDED WEAKNESS: ICD-10-CM

## 2017-04-04 DIAGNOSIS — C71.9 ASTROCYTOMA, GRADE II: ICD-10-CM

## 2017-04-04 DIAGNOSIS — Z30.41 ORAL CONTRACEPTIVE USE: ICD-10-CM

## 2017-04-04 DIAGNOSIS — R26.89 BALANCE PROBLEMS: Primary | ICD-10-CM

## 2017-04-04 PROCEDURE — 99214 OFFICE O/P EST MOD 30 MIN: CPT | Performed by: STUDENT IN AN ORGANIZED HEALTH CARE EDUCATION/TRAINING PROGRAM

## 2017-04-04 RX ORDER — LEVONORGESTREL AND ETHINYL ESTRADIOL 6-5-10
1 KIT ORAL DAILY
Qty: 28 TABLET | Refills: 11 | Status: SHIPPED | OUTPATIENT
Start: 2017-04-04 | End: 2019-06-25 | Stop reason: SDUPTHER

## 2017-04-04 RX ORDER — GENTAMICIN SULFATE 3 MG/ML
1 SOLUTION/ DROPS OPHTHALMIC 3 TIMES DAILY
Refills: 1 | COMMUNITY
Start: 2017-01-12 | End: 2017-04-04

## 2017-04-04 RX ORDER — DIAZEPAM 10 MG/1
TABLET ORAL
Refills: 0 | COMMUNITY
Start: 2017-03-23 | End: 2017-04-04

## 2017-04-04 NOTE — PROGRESS NOTES
"Subjective:       Patient ID: Kadie Mccann is a 26 y.o. female.    Chief Complaint: check-up and Contraception    HPI Comments: 25 yo with Astrocytoma GRADE II who presents for request for extension of PT/OT. States she has had improvement but would like to have more sessions as she feels she is not at her baseline. She filled out a form for medical release from [facility] in [place] to send over to our clinic. Pt would like to see [facility] for 2nd opinion for her Astrocytoma.     Pt also would like refills on her OCP. She notices less menorrhagia, less acne, and less dysmenorrhea when she is on her OCP.  She is currently not sexually active.     Review of Systems   Constitutional: Negative for chills and fever.   Respiratory: Negative for cough and shortness of breath.    Cardiovascular: Negative for chest pain.   Gastrointestinal: Negative for abdominal pain, constipation, diarrhea, nausea and vomiting.   Musculoskeletal: Positive for arthralgias and myalgias.   Neurological: Positive for weakness and numbness.       Objective:        Vitals:    04/04/17 0912   BP: 113/77   Pulse: 84   Weight: 66.2 kg (145 lb 15.1 oz)   Height: 4' 11" (1.499 m)   Body mass index is 29.48 kg/(m^2).      Physical Exam   Constitutional: She appears well-developed and well-nourished. No distress.   Wearing corrective glasses   Eyes: Conjunctivae are normal.   Pulmonary/Chest: No respiratory distress.   Abdominal: Soft. Bowel sounds are normal. She exhibits no distension.   Neurological: She is alert.   Left upper extremity weakness 3/5, right upper extremity strength 5/5   Nursing note and vitals reviewed.      Assessment:       1. Balance problems    2. Left-sided weakness    3. Astrocytoma, grade II    4. Oral contraceptive use        Plan:       Balance problems  -     Ambulatory Referral to Physical/Occupational Therapy  -     Ambulatory Referral to Physical/Occupational Therapy    Left-sided weakness  -  "    Ambulatory Referral to Physical/Occupational Therapy  -     Ambulatory Referral to Physical/Occupational Therapy    Astrocytoma, grade II  -     Ambulatory Referral to Physical/Occupational Therapy  -     Ambulatory Referral to Physical/Occupational Therapy    Oral contraceptive use  -     levonorgestrel-ethinyl estradiol (TRIVORA, 28,) 50-30 (6)/75-40 (5)/125-30(10) per tablet; Take 1 tablet by mouth once daily.  Dispense: 28 tablet; Refill: 11    - on long distance flights, preferablly recommend to sit in aisle sit to be able to get up and walk often; calf exercises  - educated not to smoke while on OCPs as it increases risk of clots , pt is a non-smoker, verbalizes understanding      Return in about 4 weeks (around 5/2/2017), or pain follow-up.

## 2017-04-05 ENCOUNTER — CLINICAL SUPPORT (OUTPATIENT)
Dept: REHABILITATION | Facility: HOSPITAL | Age: 27
End: 2017-04-05
Attending: STUDENT IN AN ORGANIZED HEALTH CARE EDUCATION/TRAINING PROGRAM
Payer: MEDICAID

## 2017-04-05 DIAGNOSIS — R53.1 LEFT-SIDED WEAKNESS: ICD-10-CM

## 2017-04-05 DIAGNOSIS — R26.89 BALANCE PROBLEMS: ICD-10-CM

## 2017-04-05 PROCEDURE — 97110 THERAPEUTIC EXERCISES: CPT | Mod: PN

## 2017-04-05 NOTE — PROGRESS NOTES
"TIME RECORD    Date:  04/05/2017    Start Time:  10:04 am  Stop Time:  10:53 am     PROCEDURES:    TIMED  Procedure Min.   Gait training 12'   TE 37'                 UNTIMED  Procedure Min.             Total Timed Minutes:  49'  Total Timed Units:  3  Total Untimed Units:  0  Charges Billed/# of units:  3 TE      Progress/Current Status    Subjective:     Patient ID: Kadie Mccann is a 26 y.o. female.  Diagnosis:   1. Left-sided weakness     2. Balance problems       Pain:   Pt stated that she has noticed improvement in her balance and strength.     Objective:     Session initated with gait training on TM x 12 minutes total (forward x 6' at 0.8 mph with 1 UE support, side stepping and backward walking x 2 minutes each with 0.4 mph with B UE support.   Pt performed TE per log x 37' with PT 1:1 including stretches in standing with stairs for UE support.     Date  4/5/17 3/28/17 3/23/17 3/15/17 3/8/17 2/24/17 2/15/17 2/1/17 1/25/17 1/18/17 1/11/17 1/9/17 12/28/16 12/23/16   VISIT 15 14 13 12 11 10 9 8 7 6 5 4 3 2   POC 4/15/17 - -- --              MT - -- -- -- --  -- -- - - - - -- --   Quad str 2x30" on stairs 2 x 30'' on stairs               Long Sit HS 2x30" on stairs 2 x 30'' on stairs -- -- --  3 x 30'' B -- -- - - - -- --   Gastroc Str. 3x30" stairs 3 x 30'' on stairs NT NT NT 3 x 30'' on stairs 3 x 30'' on stairs -- - -   -- --   Nu step  declined S=13, L1.5  10 min S=13, L1.5  15 min S=10, L1.5  10 min f/b L1 x 20' S=10, L1.3  10 min S=12, L1.3  10 min S=12, L1.5  8 min S=6, L4  9 min Nu step  8 koki Nu step  8 min  Nu step  8 min  Bike   10'  Bike   10'  L3   10'    Hip ext - -- -- -- -- -- --   1" 2x10 b       Bridges - -- -- -- -- -- -- 2x15 2x15 2x15 2x15 B 2x12 B 2x10  2x10    SLR - -- -- -- -- -- --  - 1# 2x10 B 1# 2x10 B 2x12 B 2x10  2x10    SL Hip Add - -- -- -- -- -- --  - 1# 2x10 B 1# 2x10 B 2x12 B 2x10  2x10    SL Hip Abd - -- -- -- -- -- --  - 1# 2x10 B 1# 2x10 2x12 B 2x10  2x10    LAQs - -- -- " -- -- -- --  -  -  -- --   Balance on physioball declined See note -- -- -- -- --    -  -- --   Cybex   Leg Press 4.5 2x12 DL    3.0 2x12 SL next NT NT 4.5   2x12    3.0  2x12 U OOT 4.5   2x12    3.0  2x12 U 4.5   2x10    3.0  2x10 U 4.0  2x15    2.5  2x15 4.0   3x10    2.5  2x15  4.0   2x10    2.0 uni  2x12 4.0   2x10    2.0 uni  2x12 4.0   2x10  --   TKE 2x10 RTB L Next with RTB on L NT NT -- -- --  -   - -- --   Heel/toe raises  2x15 B 2 x 15 B NT NT 2 x 10 B OOT 2 x 10 B  -   - -- --   Mini squats  2x10 with fingertips 2 x 10 without UE  NT NT 2 x 10 without UE OOT 2 x 10  1x10  NT 2x10 2x10 2x10 -- 2x10    Hip abd GTC // 2x12 B GTC // 2x12 B NT GTC // 2x12 B GTC // 2x12 B GTC // 2x12 B GTC // 2x12 B RTC // 2x12 B RTC 2x10 //  - - -- --   Hip Flex GTC // 2x12 B GTC // 2x12 B NT GTC // 2x12 B GTC // 2x12 B OOT GTC // 2x12 B RTC //  2x12 B RTC  2x10 //  - - -- --   Hip Ext GTC 2x12 B GTC // 2x12 B NT GTC // 2x12 B GTC // 2x12 B OOT GTC // 2x12 B RTC //  2x12 B RTC   2x10 //  - - -- --   HS Curls - -- -- -- -- -- --    - - -- --   Knee Ext - NT NT NT NT NT NT NT 15# 3x10 15# 2x12 15# 2x10 - -- --   Step Ups - NT NT NT NT NT NT oot Blue 2x10 ea fwd/lat Blue x10  Fwd/ Lat Blue x10  Fwd/ Lat - -- --   Step Downs - NT NT NT NT NT NT    - - -- --   Gait See note on TM See note TM See note on TM See note On TM On TM On TM   Note Note note note note   Dev seq - NT See note              Balance  On TM On TM -- -- See note See note See DGI Note note Note note note note Note    Initials CK FS  FS FS FS FS DH 5/6 CS 4/6 JA 3/6 JA 2/6 JA 1/6 MNB MNB       Assessment:     Pt received multiple rest breaks during therapy session today. Pt declined to participate in balance on PB therex and Nu step today due to feeling fatigued today.     Patient Education/Response:     Continue with HEP     Plans and Goals:     SEE updated POC, continue balance and gait training with strengthening  Short Term Goals= Long term goals (8 Weeks):     1. Independent with initial HEP.  2. Pt will score greater than or equal to 20/24 on the DGI test/assessment without use of AD placing patient in 10-20% impaired, limited, or restricted category demonstrating overall improved functional mobility and balance. (ode goal mobility CI) MET without AD  3. Pt will be able to safely perform and tolerate high level ADL's without LOB. progressing  4. Able to ambulate community distances without use of assistive device without pain or gait deficits. NOT met still using SPC 25-40% of the time  5. Pt will have MMT score of 5/5 in all major ms groups in L LE. MET  6. Pt will ambulate on TM x 10 minutes without use of UE support with 0 LOB at 2.0 mph with minimal gait deviations without LOB. Not met but progressing   7. Pt will report 34% on the FOTO Functional Assessment placing the patient in the 20-40% impaired, limited, or restricted category indicating increased functional balance and mobility.

## 2017-05-01 ENCOUNTER — TELEPHONE (OUTPATIENT)
Dept: REHABILITATION | Facility: HOSPITAL | Age: 27
End: 2017-05-01

## 2017-09-20 ENCOUNTER — OFFICE VISIT (OUTPATIENT)
Dept: FAMILY MEDICINE | Facility: HOSPITAL | Age: 27
End: 2017-09-20
Attending: FAMILY MEDICINE
Payer: MEDICAID

## 2017-09-20 VITALS
WEIGHT: 148.56 LBS | HEIGHT: 57 IN | DIASTOLIC BLOOD PRESSURE: 76 MMHG | SYSTOLIC BLOOD PRESSURE: 119 MMHG | HEART RATE: 86 BPM | BODY MASS INDEX: 32.05 KG/M2

## 2017-09-20 DIAGNOSIS — C71.9 ASTROCYTOMA: Primary | ICD-10-CM

## 2017-09-20 DIAGNOSIS — R26.89 BALANCE PROBLEMS: ICD-10-CM

## 2017-09-20 DIAGNOSIS — R47.9 SPEECH PROBLEM: ICD-10-CM

## 2017-09-20 PROCEDURE — 99213 OFFICE O/P EST LOW 20 MIN: CPT | Performed by: FAMILY MEDICINE

## 2017-09-20 NOTE — PROGRESS NOTES
Subjective:       Patient ID: Kadie Mccann is a 27 y.o. female.    Chief Complaint: Follow-up (needs referrals for physical and speech therapy)    HPI   26 yo female, w/ h/o astrocytoma grade II s/p chemo/radiation (last course was in 11/2016), presents for a follow up. Patient went to her neurologist about 1 month ago. They stated it looks like the tumor has decreased, and their recs are for PT and speech. Patient feels she is not able to speech as well as she use to especially after she got a biopsy in 7/2016. Denies any other complaints.    Review of Systems   Constitutional: Negative for chills and fever.   HENT: Negative for congestion.    Respiratory: Negative for shortness of breath and wheezing.    Cardiovascular: Negative for chest pain, palpitations and leg swelling.   Gastrointestinal: Negative for abdominal pain, constipation, diarrhea, nausea and vomiting.   Genitourinary: Negative for dysuria.   Neurological: Negative for light-headedness and headaches.       Objective:      Vitals:    09/20/17 1008   BP: 119/76   Pulse: 86     Physical Exam   Constitutional: She is oriented to person, place, and time. No distress.   HENT:   Head: Normocephalic and atraumatic.   Eyes: Conjunctivae are normal.   Neck: Normal range of motion. Neck supple.   Cardiovascular: Normal rate, regular rhythm, normal heart sounds and intact distal pulses.  Exam reveals no gallop and no friction rub.    No murmur heard.  Pulmonary/Chest: Effort normal and breath sounds normal. She has no wheezes. She has no rales.   Abdominal: Soft. Bowel sounds are normal. There is no tenderness.   Musculoskeletal: She exhibits no edema.   Neurological: She is alert and oriented to person, place, and time. She displays normal reflexes. No cranial nerve deficit. She exhibits normal muscle tone. Coordination normal.   Skin: Skin is warm and dry.   Psychiatric: She has a normal mood and affect. Her behavior is normal. Judgment and thought  content normal.       Assessment:       1. Astrocytoma    2. Balance problems    3. Speech problem        Plan:       Astrocytoma  - S/p chemo and radiation. Last cycle was 10/2016.  -     Has a f/u w/ neurosurgery within a 'few months' w/ imaging per patient    Balance problems  -     Ambulatory Referral to Physical/Occupational Therapy    Speech problem  -     Ambulatory Referral to Speech Therapy    RTC 3 months

## 2017-10-05 ENCOUNTER — CLINICAL SUPPORT (OUTPATIENT)
Dept: REHABILITATION | Facility: HOSPITAL | Age: 27
End: 2017-10-05
Attending: FAMILY MEDICINE
Payer: MEDICAID

## 2017-10-05 DIAGNOSIS — C71.9 ASTROCYTOMA, GRADE II: ICD-10-CM

## 2017-10-05 DIAGNOSIS — R26.89 BALANCE PROBLEMS: ICD-10-CM

## 2017-10-05 PROCEDURE — 92523 SPEECH SOUND LANG COMPREHEN: CPT | Mod: PN

## 2017-10-05 PROCEDURE — 97161 PT EVAL LOW COMPLEX 20 MIN: CPT | Mod: PN

## 2017-10-05 NOTE — PLAN OF CARE
TIME RECORD    Date: 10/5/17    Start Time:  2:00  Stop Time:  2:45    PROCEDURES:    TIMED  Procedure Min.                         UNTIMED  Procedure Min.   1 low comp eval 45         Total Timed Minutes:  0  Total Timed Units:  0  Total Untimed Units:  1  Charges Billed/# of units:  1 low eval      OUTPATIENT NEUROLOGICAL REHABILITATION  PHYSICAL THERAPY EVALUATION    Onset Date:  NF type I, Is congenitial and dx of brain tumor  Primary Diagnosis:   1. Balance problems       Treatment Diagnosis: higher level balance impairments and decreased endurance  Past Medical History:   Diagnosis Date    Fibromatosis      Precautions: seizure  Prior Therapy: late  and early  for balance  Medications: Kadie Mccann has a current medication list which includes the following prescription(s): levonorgestrel-ethinyl estradiol.  Nutrition:  Normal  History of Present Illness: Pt has had improved balance with last therapy episode but she is afraid and apprehensive to start working out indep and wanted PT for guidance  Prior Level of Function: Assistive Device, pt used [facility] [place] last year for balance   Social History: lives with sister and young son  Place of Residence (Steps/Adaptations): --  Functional Deficits Leading to Referral/Nature of Injury: brain tumor  Current functional status:  INdep with no recent falls since d/c from last PT episode  DME owned: [facility] [place]  Work/Job description:  --  Fall Incidence: none since last PT discharge   Patient Therapy Goals: to go work out at the gym      Subjective:      Pt stated: she wants to work out at the gym and she plan to join [employer] and go with a family member the first few times and she was hoping PT could give her exercises to do in the gym   Family present/states: --  Pain: 0/10    Objective:      - Command followin%   - Speech: pt being eval'd for speech today, see SLP eval  Dominant hand:  right      Posture Alignment :slouched posture     Sensation:  Light  Touch: Impaired: Pt reported hypersensitivity in R side(cold sensation)                      Proprioception:   Intact     Tone: 0 - No increase in muscle tone        Visual/Auditory: stated that R ear is better than L, pt stated that her vision has changed and she is scheduled for appt in January to get eyes checked.     Coordination:   - fine motor: L hand with decreased dexterity  - UE coordination: moderate L dysmetria     - LE coordination:  Moderate impairment with toe taps but inconsistent with gait pattern     ROM:   UPPER EXTREMITY--AROM/PROM  (R) UE: WFLs  (L) UE: WFLs            RANGE OF MOTION--LOWER EXTREMITIES  (R) LE Hip: normal              Knee: normal              Ankle: normal     (L) LE: Hip: normal              Knee: normal              Ankle: normal     Upper Extremity Strength    RUE LUE   Shoulder Flexion: 5/5 4/5   Shoulder Abduction: 5/5 4/5   Elbow Flexion:    5/5 4/5   Elbow Extension: 5/5 4/5   Wrist Flexion: 5/5 4/5   : 5/5 4+/5    Lower Extremity Strength    RLE LLE   Hip Flexion: 5/5 5/5   Hip Extension:  5/5 5/5   Hip Abduction: 5/5 5/5   Knee Extension: 5/5 5/5   Knee Flexion: 5/5 5/5   Ankle Dorsiflexion: 5/5 5/5   Ankle Plantarflexion: 5/5 5/5         Gait Assessment:   - AD used: none  - Assistance: none  - Distance: >200 ft  - Stairs: see FGA    Gait Analysis:  Deviations noted: mild sway and decreased UE swing with gait and mild dysmetria in L foot with gait    Impairments contributing to deviations:  L dyscoordination and L LE tone    Endurance Deficit: --     Balance Assessment:    Sitting balance (static,dynamic):WFL  Standing balance (static, dynamic):See FGA    Functional Gait Assessment:   1. Gait on level surface =  3   (3) Normal: less than 5.5 sec, no A.D., no imbalance, normal gait pattern, deviates< 6in   (2) Mild impairment: 7-5.6 sec, uses A.D., mild gait deviations, or deviates 6-10 in   (1) Moderate impairment: > 7 sec, slow speed, imbalance, deviates 10-15  in.   (0) Severe impairment: needs assist, deviates >15 in, reach/touch wall  2. Change in Gait Speed = 2   (3) Normal: smooth change w/o loss of balance or gait deviation, deviates < 6 in, significant difference between speeds   (2) Mild impairment: changes speed, but demonstrates mild gait deviations, deviates 6-10 in, OR no deviations but unable to significantly speed, OR uses A.D.   (1) Moderate impairment: minor changes to speed, OR changes speed w/ significant deviations, deviates 10-15 in, OR  Changes speed , but loses balance & recovers   (0) Severe impairment: cannot change speed, deviates >15 in, or loses balance & needs assist  3. Gait with horizontal head turns  = 3   (3) Normal: no change in gait, deviates <6 in   (2) Mild impairment: slight change in speed, deviates 6-10 in, OR uses A.D.   (1) Moderate impairment: moderate change in speed, deviates 10-15 in   (0) Severe impairment: severe disruption of gait, deviates >15in  4. Gait with vertical head turns = 3   (3) Normal: no change in gait, deviates <6 in   (2) Mild impairment: slight change in speed, deviates 6-10 in OR uses A.D.   (1) Moderate impairment: moderate change in speed, deviates 10-15 in   (0) Severe impairment: severe disruption of gait, deviates >15 in  5. Gait with pivot turns = 3   (3) Normal: performs safely in 3 sec, no LOB   (2) Mild impairment: performs in >3 sec & no LOB, OR turns safely & requires several steps to regain LOB   (1) Moderate impairment: turns slow, OR requires several small steps for balance following turn & stop   (0) Severe impairment: cannot turn safely, needs assist  6. Step over obstacle = 2   (3) Normal: steps over 2 stacked boxes w/o change in speed or LOB   (2) Mild impairment: able to step over 1 box w/o change in speed or LOB   (1) Moderate impairment: steps over 1 box but must slow down, may require VC   (0) Severe impairment: cannot perform w/o assist  7. Gait with Narrow IRINEO = 2   (3) Normal: 10  steps no staggering   (2) Mild impairment: 7-9 steps   (1) Moderate impairment: 4-7 steps   (0) Severe impairment: < 4 steps or cannot perform w/o assist  8. Gait with eyes closed = 2   (3) Normal: < 7 sec, no A.D., no LOB, normal gait pattern, deviates <6 in   (2) Mild impairment: 7.1-9 sec, mild gait deviations, deviates 6-10 in   (1) Moderate impairment: > 9 sec, abnormal pattern, LOB, deviates 10-15 in   (0) Severe impairment: cannot perform w/o assist, LOB, deviates >15in  9. Ambulating Backwards = 3   (3) Normal: no A.D., no LOB, normal gait pattern, deviates <6in   (2) Mild impairment: uses A.D., slower speed, mild gait deviations, deviates 6-10 in   (1) Moderate impairment: slow speed, abnormal gait pattern, LOB, deviates 10-15 in   (0) Severe impairment: severe gait deviations or LOB, deviates >15in  10. Steps = 2   (3) Normal: alternating feet, no rail   (2) Mild Impairment: alternating feet, uses rail   (1) Moderate impairment: step-to, uses rail   (0) Severe impairment: cannot perform safely    Score 25/30     Functional Mobility (Bed mobility, transfers)  Bed mobility: I  Supine to sit: I  Sit to supine: I  Rolling: I  Transfers to bed: I  Sit to stand:  I  Stand pivot:  I  Stairs: I  Wheelchair mobility: N/A    Patient Education/Response:     Written Home Exercises Provided: see scanned doc in media  Pt demo good understanding of the education provided. Kadie CAMERON demonstrated good return demonstration of activities.     Education provided re:role of PT, goals for PT, scheduling - pt verbalized good understanding.     Assessment:   Initial Assessment (Pertinent finding, problem list and factors affecting outcome):This is a 27 y.o. female referred to outpatient physical therapy and presents with a medical diagnosis of balance problems and low endurance. SHe was recently a pt hear and her balance has improved but she is concerned that she needs guidance to start working out at a gym and is seeking PT for  exercise program.   Pt will issued HEP for gym and d/c.      Plans and Goals:   D/c pt to gym with HEP due to status not changing or declining since last PT episode  Certification Period: 10/5/17 to 11/5/17      Other Recommendations: OT for L UE dymetria and coordination impairments.       Therapist: Mila Beard, PT    I CERTIFY THE NEED FOR THESE SERVICES FURNISHED UNDER THIS PLAN OF TREATMENT AND WHILE UNDER MY CARE    Physician's comments: ____________________________________________________________________________________________________________________________________________    Physician's Name: ___________________________________
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
## 2017-10-10 ENCOUNTER — TELEPHONE (OUTPATIENT)
Dept: REHABILITATION | Facility: HOSPITAL | Age: 27
End: 2017-10-10

## 2017-10-10 PROBLEM — R26.89 BALANCE PROBLEMS: Status: ACTIVE | Noted: 2017-10-10

## 2017-10-11 ENCOUNTER — CLINICAL SUPPORT (OUTPATIENT)
Dept: REHABILITATION | Facility: HOSPITAL | Age: 27
End: 2017-10-11
Attending: FAMILY MEDICINE
Payer: MEDICAID

## 2017-10-11 DIAGNOSIS — C71.9 ASTROCYTOMA, GRADE II: ICD-10-CM

## 2017-10-11 PROCEDURE — 92507 TX SP LANG VOICE COMM INDIV: CPT | Mod: PN

## 2017-10-11 PROCEDURE — 92526 ORAL FUNCTION THERAPY: CPT | Mod: PN

## 2017-10-11 NOTE — PROGRESS NOTES
OUTPATIENT REHABILITATION  SPEECH THERAPY PROGRESS NOTE    Date:  10/11/2017    Start Time:  1315  Stop Time:  1400    Subjective/History  Onset Date:  NF type I, Is congenitial  Primary Diagnosis:  Astrocytoma  Treatment Diagnosis:  Dysphonia, dysarthria, dysphagia  Referring Provider:  Dr. Fiordaliza Marquez  Orders: ST for evaluation and treat  Current Medical History:  Kadie Mccann presents to the [facility] secondary to the diagnosis of astrocytoma.  Pt has reportedly undergone chemotherapy.  Size of tumor has reportedly decreased.     Past Medical History:   Past Medical History:   Diagnosis Date    Fibromatosis        TIMED  Procedure Min.                      UNTIMED  Procedure Min.   Swallow tx  15 minutes   Speech/language tx  30 minutes     Total Minutes: 45  Total Timed Units: 0  Total Untimed Units: 2  Charges Billed/# of units: 2    Visit #: 2  Date of Evaluation: 10/5/17    Progress/Current Status    Subjective:     Pain: 0 /10  Pt pleasant. No pain reported. Pt excited to work on her voice.     Objective:     Short Term Goals   1. Pt will participate in a clinical swallow evaluation to assess swallow function.   -difficulty with chicken, meat, beef.  Have to chew it a lot.    -not much trouble with crumbly foods; needs to concentrate on eating it slow.   Clinical swallow evaluation completed. No overt signs/symptoms of aspiration. No anterior spillage. Adequate A-P transport. Adequate bolus control.  No coughing, throat clearing, or changes in vocal quality noted today during thin liquids, puree, soft solid, and solid trials. No pocketing noted. No oral residue noted.      2. Pt will answer complex yes/no questions with 80% acc given min A to address auditory comprehension.   -Not addressed this session.     3. Pt will sequence 3 steps with 80% acc given min A to improve cognition.   -Not addressed this session.     4. Pt will spell 4-6 letter words  with 80% acc given min A to improve language skills.   -Not addressed this session.     5. Pt will participate in vocal function exercises (sustained phonation, pitch glides, etc) x 5 each with min A to improve vocal quality.   -pitch glides /a/ and /i/ x 5 each with min-mod A  -sustained /a/ for 10 seconds   -sustained /i/ for 20 seconds  -sustained /m/ for 15 seconds  -sustained /u/ for 21 seconds  -resonant therapy exercises /m/, /b/, /p/ x 45 total    6. Pt will recall/utilize memory strategies (WRAP) with min A to assist in delayed recall.   -Not addressed this session.     7. Pt will participate in delayed recall of 3 words given min A utilizing memory strategies.   -Not addressed this session.     8. Pt will name 15 abstract category members in 60 seconds with 80% acc given min A to improve word finding.   -hot x 11 Independently   -cold x 13 Independently     9. Pt will participate in mental manipulation tasks with 80% acc given min A to address attention.   -Not addressed this session.     10. Pt will participate in articulation hierarchy exercises with 80% acc given min A to improve dysarthria.   2 syllable words x 10 each with min A for speech intelligibility strategies  3 syllable words x 10 each with min A for speech intelligibility strategies  4 syllable words x 10 each with min A for speech intelligibility strategies  5 syllable words x 10 each with min A for speech intelligibility strategies  2 word phrases x 10 each with min A for speech intelligibility strategies  3 word phrases x 10 each with min A for speech intelligibility strategies  4 word phrases x 10 each with min A for speech intelligibility strategies  5 word phrases x 10 each with min A for speech intelligibility strategies  6 word phrases x 10 each with min A for speech intelligibility strategies    Long Term Goals   1. Pt will participate in vocal function exercises to improve overall quality of voice with min A.   2. Pt will  "participate in delayed recall, memory strategies, sequencing, attention tasks to improve overall cognition with min A.     Assessment:     Mild dysarthria c/b slow rate of speech and imprecise articulation. Moderate dysphonia noted c/b decreased breath support, pitch variability, tremor, pitch breaks during sustained phonation and pitch glides. Speech intelligibility %. Mild dysphagia based on pt's report of coughing on solids and thin liquids occasionaly; will assess further. Mild cognitive-linguistic deficits c/b impairments in attention, language, spelling, and delayed recall. Mild expressive and receptive aphasia c/b difficulty with complex yes/no questions and occasional word-finding deficits. Possible R side hearing loss c/b "muffled sound" and difficulty hearing per pt report. Patient will benefit from outpatient neurological rehabilitation speech therapy.    Patient Education/Response:     Educated pt on goals and plan of care. Pt reported understanding.     Plans and Goals:     Continue POC.   Updated POC due 12/5/17.    Christel Aguayo M.S.CCC-SLP  Speech Language Pathologist         "

## 2017-10-12 ENCOUNTER — CLINICAL SUPPORT (OUTPATIENT)
Dept: REHABILITATION | Facility: HOSPITAL | Age: 27
End: 2017-10-12
Attending: FAMILY MEDICINE
Payer: MEDICAID

## 2017-10-12 ENCOUNTER — CLINICAL SUPPORT (OUTPATIENT)
Dept: FAMILY MEDICINE | Facility: HOSPITAL | Age: 27
End: 2017-10-12
Payer: MEDICAID

## 2017-10-12 DIAGNOSIS — C71.9 ASTROCYTOMA, GRADE II: ICD-10-CM

## 2017-10-12 DIAGNOSIS — R49.0 DYSPHONIA: ICD-10-CM

## 2017-10-12 DIAGNOSIS — R47.1 DYSARTHRIA: ICD-10-CM

## 2017-10-12 DIAGNOSIS — Z23 IMMUNIZATION DUE: Primary | ICD-10-CM

## 2017-10-12 DIAGNOSIS — R41.89 COGNITIVE DEFICITS: ICD-10-CM

## 2017-10-12 PROCEDURE — 97532 *HC SP COG SKL DEV EA 15 MIN: CPT | Mod: PN

## 2017-10-12 PROCEDURE — 99211 OFF/OP EST MAY X REQ PHY/QHP: CPT | Mod: 25

## 2017-10-12 PROCEDURE — 92507 TX SP LANG VOICE COMM INDIV: CPT | Mod: PN

## 2017-10-12 PROCEDURE — 90471 IMMUNIZATION ADMIN: CPT

## 2017-10-12 NOTE — PROGRESS NOTES
OUTPATIENT REHABILITATION  SPEECH THERAPY PROGRESS NOTE    Date:  10/12/2017    Start Time:  1145  Stop Time:  1230    Subjective/History  Onset Date:  NF type I, Is congenitial  Primary Diagnosis:  Astrocytoma  Treatment Diagnosis:  Dysphonia, dysarthria, dysphagia  Referring Provider:  Dr. Fiordaliza Marquez  Orders: ST for evaluation and treat  Current Medical History:  Kadie Mccann presents to the [facility] secondary to the diagnosis of astrocytoma.  Pt has reportedly undergone chemotherapy.  Size of tumor has reportedly decreased.     Past Medical History:   Past Medical History:   Diagnosis Date    Fibromatosis        TIMED  Procedure Min.   Cognitive tx  15 minutes                 UNTIMED  Procedure Min.   Swallow tx  0 minutes   Speech/language tx  30 minutes     Total Minutes: 45  Total Timed Units: 0  Total Untimed Units: 2  Charges Billed/# of units: 2    Visit #: 3  Date of Evaluation: 10/5/17    Progress/Current Status    Subjective:     Pain: 0 /10  Pt pleasant. No pain reported. Pt excited because she lost 9 pounds in one month. Perseverating on this topic.     Objective:     Short Term Goals   1. Pt will participate in a clinical swallow evaluation to assess swallow function. GOAL MET    2. Pt will answer complex yes/no questions with 80% acc given min A to address auditory comprehension.   -Not addressed this session.     3. Pt will sequence 3 steps with 80% acc given min A to improve cognition.   -sequencing 3 step scenarios x 4 with min A; self correction x 2  -sequencing 4 step scenarios x 3 with mod A  -some word finding noted while describing sequence of events  -pt stated that it was difficult to pay attention and focus; pt reportedly frustrated    4. Pt will spell 4-6 letter words with 80% acc given min A to improve language skills.   -Not addressed this session.     5. Pt will participate in vocal function exercises (sustained phonation,  pitch glides, etc) x 5 each with min A to improve vocal quality.   -pitch glides /a/ and /i/ x 3 each with min-mod A; able to achieve high and low pitch; fluctuation noted during pitch glides; no significant pitch breaks noted  -sustained /a/ -Not addressed this session.   -sustained /i/ -Not addressed this session.   -sustained /m/ -Not addressed this session.   -sustained /u/ for 6 seconds, then 27 seconds  -resonant therapy exercises /m/, /b/, /p/ x 45 total    6. Pt will recall/utilize memory strategies (WRAP) with min A to assist in delayed recall.   -Not addressed this session.     7. Pt will participate in delayed recall of 3 words given min A utilizing memory strategies.   -Not addressed this session.     8. Pt will name 15 abstract category members in 60 seconds with 80% acc given min A to improve word finding.   -Not addressed this session.     9. Pt will participate in mental manipulation tasks with 80% acc given min A to address attention.   -Not addressed this session.     10. Pt will participate in articulation hierarchy exercises with 80% acc given min A to improve dysarthria.   -Not addressed this session.     11. Pt will named uncommon objects with 90% acc given min A to improve word finding skills.   -74% acc Independently (26/35)  -86% acc min A (30/35)    12. Pt will read 3-5 word sentences with 85% acc given min A.   -read 3 word sentences with 89% acc Independently (24/27)  -read 3 word sentences with 93% acc given min A (25/27)  -slow processing; slow rate     Long Term Goals   1. Pt will participate in vocal function exercises to improve overall quality of voice with min A.   2. Pt will participate in delayed recall, memory strategies, sequencing, attention tasks to improve overall cognition with min A.     Assessment:     Mild dysarthria c/b slow rate of speech and imprecise articulation. Moderate dysphonia noted c/b decreased breath support, pitch variability, tremor, pitch breaks during  "sustained phonation and pitch glides. Speech intelligibility %. Mild dysphagia based on pt's report of coughing on solids and thin liquids occasionaly; will assess further. Mild cognitive-linguistic deficits c/b impairments in attention, language, spelling, and delayed recall. Mild expressive and receptive aphasia c/b difficulty with complex yes/no questions and occasional word-finding deficits. Possible R side hearing loss c/b "muffled sound" and difficulty hearing per pt report. Patient will benefit from outpatient neurological rehabilitation speech therapy.    Patient Education/Response:     Educated pt on goals and plan of care. Pt reported understanding.     Plans and Goals:     Added two goals for naming objects and reading sentences  Note: pt would like to add a goal for rhyming     Continue POC.   Updated POC due 12/5/17.    Christel Aguayo M.S.CCC-SLP  Speech Language Pathologist         "

## 2017-10-13 PROBLEM — R47.1 DYSARTHRIA: Status: ACTIVE | Noted: 2017-10-13

## 2017-10-13 PROBLEM — R49.0 DYSPHONIA: Status: ACTIVE | Noted: 2017-10-13

## 2017-10-13 PROBLEM — R41.89 COGNITIVE DEFICITS: Status: ACTIVE | Noted: 2017-10-13

## 2017-10-17 ENCOUNTER — CLINICAL SUPPORT (OUTPATIENT)
Dept: REHABILITATION | Facility: HOSPITAL | Age: 27
End: 2017-10-17
Attending: FAMILY MEDICINE
Payer: MEDICAID

## 2017-10-17 DIAGNOSIS — R47.1 DYSARTHRIA: ICD-10-CM

## 2017-10-17 DIAGNOSIS — R49.0 DYSPHONIA: ICD-10-CM

## 2017-10-17 DIAGNOSIS — R41.89 COGNITIVE DEFICITS: ICD-10-CM

## 2017-10-17 PROCEDURE — 92507 TX SP LANG VOICE COMM INDIV: CPT | Mod: PN

## 2017-10-17 NOTE — PROGRESS NOTES
OUTPATIENT REHABILITATION  SPEECH THERAPY PROGRESS NOTE    Date:  10/17/2017     Start Time:  1100  Stop Time:  1145    Subjective/History  Onset Date:  NF type I, Is congenitial  Primary Diagnosis:  Astrocytoma  Treatment Diagnosis:  Dysphonia, dysarthria, dysphagia  Referring Provider:  Dr. Fiordaliza Marquez  Orders: ST for evaluation and treat  Current Medical History:  Kadie Mccann presents to the [facility] secondary to the diagnosis of astrocytoma.  Pt has reportedly undergone chemotherapy.  Size of tumor has reportedly decreased.     Past Medical History:   Past Medical History:   Diagnosis Date    Fibromatosis        TIMED  Procedure Min.   Cognitive tx  15 minutes                 UNTIMED  Procedure Min.   Swallow tx  0 minutes   Speech/language tx  30 minutes     Total Minutes: 45  Total Timed Units: 0  Total Untimed Units: 1  Charges Billed/# of units: 1    Visit #: 4  Date of Evaluation: 10/5/17    Progress/Current Status    Subjective:     Pain: 0 /10  Pt pleasant. No pain reported. Pleasant.     Objective:     Short Term Goals   1. Pt will participate in a clinical swallow evaluation to assess swallow function. GOAL MET    2. Pt will answer complex yes/no questions with 80% acc given min A to address auditory comprehension.   -84% acc (21/25)  -pt answered before/after questions incorrectly    3. Pt will sequence 3 steps with 80% acc given min A to improve cognition.   -sequencing 3 step scenarios -Not addressed this session.   -sequencing 4 step scenarios -100% acc (6/6) given min A  -some word finding noted while describing sequence of events    4. Pt will spell 4-6 letter words with 80% acc given min A to improve language skills.   -87% acc (13/15) given min A   -Pt reported disliking this task.    5. Pt will participate in vocal function exercises (sustained phonation, pitch glides, etc) x 5 each with min A to improve vocal quality.   -pitch glides  /a/ and /i/  each with min-mod A; able to achieve high and low pitch; fluctuation noted during pitch glides; no significant pitch breaks noted -Not addressed this session.   -sustained /a/ -16 seconds  -sustained /i/ -26 seconds  -sustained /m/ -26.5 seconds  -sustained /u/ for 27 seconds  -resonant therapy exercises /m/, /b/, /p/ x 45 total    6. Pt will recall/utilize memory strategies (WRAP) with min A to assist in delayed recall.   -reviewed  -max A    7. Pt will participate in delayed recall of 3 words given min A utilizing memory strategies.   -100% acc Independently  -BAM (banana, apple, marbin) and OMG (olive oil, milk, garlic)    8. Pt will name 15 abstract category members in 60 seconds with 80% acc given min A to improve word finding.   -round x 12 Independently in 60 seconds  -round x 28 with min-mod A     9. Pt will participate in mental manipulation tasks with 80% acc given min A to address attention.   -Not addressed this session.     10. Pt will participate in articulation hierarchy exercises with 80% acc given min A to improve dysarthria.   -Not addressed this session.     11. Pt will named uncommon objects with 90% acc given min A to improve word finding skills.   -Not addressed this session.     12. Pt will read 3-5 word sentences with 85% acc given min A.   -read 3 word sentences -Not addressed this session.   -read 3 word sentences -Not addressed this session.     Long Term Goals   1. Pt will participate in vocal function exercises to improve overall quality of voice with min A.   2. Pt will participate in delayed recall, memory strategies, sequencing, attention tasks to improve overall cognition with min A.     Assessment:     Mild dysarthria c/b slow rate of speech and imprecise articulation. Moderate dysphonia noted c/b decreased breath support, pitch variability, tremor, pitch breaks during sustained phonation and pitch glides. Speech intelligibility %. Mild dysphagia based on pt's  "report of coughing on solids and thin liquids occasionaly; will assess further. Mild cognitive-linguistic deficits c/b impairments in attention, language, spelling, and delayed recall. Mild expressive and receptive aphasia c/b difficulty with complex yes/no questions and occasional word-finding deficits. Possible R side hearing loss c/b "muffled sound" and difficulty hearing per pt report. Patient will benefit from outpatient neurological rehabilitation speech therapy.    Patient Education/Response:     Educated pt on goals and plan of care. Pt reported understanding.     Plans and Goals:     Added two goals for naming objects and reading sentences  Note: pt would like to add a goal for rhyming     Continue POC.   Updated POC due 12/5/17.    Christel Aguayo M.S.CCC-SLP  Speech Language Pathologist         "

## 2017-10-20 ENCOUNTER — CLINICAL SUPPORT (OUTPATIENT)
Dept: REHABILITATION | Facility: HOSPITAL | Age: 27
End: 2017-10-20
Attending: FAMILY MEDICINE
Payer: MEDICAID

## 2017-10-20 DIAGNOSIS — R49.0 DYSPHONIA: ICD-10-CM

## 2017-10-20 DIAGNOSIS — R47.1 DYSARTHRIA: ICD-10-CM

## 2017-10-20 DIAGNOSIS — R41.89 COGNITIVE DEFICITS: ICD-10-CM

## 2017-10-20 PROCEDURE — 97532 *HC SP COG SKL DEV EA 15 MIN: CPT | Mod: PN

## 2017-10-20 PROCEDURE — 92507 TX SP LANG VOICE COMM INDIV: CPT | Mod: PN

## 2017-10-20 NOTE — PROGRESS NOTES
OUTPATIENT REHABILITATION  SPEECH THERAPY PROGRESS NOTE    Date:  10/20/2017     Start Time:  1015  Stop Time:  1100    Subjective/History  Onset Date:  NF type I, Is congenitial  Primary Diagnosis:  Astrocytoma  Treatment Diagnosis:  Dysphonia, dysarthria, dysphagia  Referring Provider:  Dr. Fiordaliza Marquez  Orders: ST for evaluation and treat  Current Medical History:  Kadie Mccann presents to the [facility] secondary to the diagnosis of astrocytoma.  Pt has reportedly undergone chemotherapy.  Size of tumor has reportedly decreased.     Past Medical History:   Past Medical History:   Diagnosis Date    Fibromatosis        TIMED  Procedure Min.   Cognitive tx  25 minutes                 UNTIMED  Procedure Min.   Swallow tx  0 minutes   Speech/language tx  20 minutes     Total Minutes: 45  Total Timed Units: 0  Total Untimed Units: 1  Charges Billed/# of units: 1    Visit #: 5  Date of Evaluation: 10/5/17    Progress/Current Status    Subjective:     Pain: 0 /10  Pt pleasant. No pain reported. Pleasant.     Objective:     Short Term Goals   1. Pt will participate in a clinical swallow evaluation to assess swallow function. GOAL MET    2. Pt will answer complex yes/no questions with 80% acc given min A to address auditory comprehension. GOAL MET    3. Pt will sequence 3-4 steps with 80% acc given min A to improve cognition.   -sequencing 3 step scenarios -Not addressed this session.   -sequencing 4 step scenarios -100% acc (6/6) given min A with visual  -written sequencing 4 steps - 75% acc Independently; 100% acc min A    4. Pt will spell 4-6 letter words with 80% acc given min A to improve language skills.   -Not addressed this session.     5. Pt will participate in vocal function exercises (sustained phonation, pitch glides, etc) x 5 each with min A to improve vocal quality.   -pitch glides /a/ and /i/  each with min-mod A; able to achieve high and low pitch;  fluctuation noted during pitch glides; no significant pitch breaks noted -Not addressed this session.   -sustained /a/ -25 seconds  -sustained /i/ -26 seconds  -sustained /m/ -31 seconds  -sustained /u/ for 25 seconds  -resonant therapy exercises /m/, /b/, /p/ x 45 total    6. Pt will recall/utilize memory strategies (WRAP) with min A to assist in delayed recall.   -reviewed  -max A    7. Pt will participate in delayed recall of 3 words given min A utilizing memory strategies.   -Not addressed this session.   -BAM (banana, apple, marbin) and OMG (olive oil, milk, garlic)    8. Pt will name 15 abstract category members in 60 seconds with 80% acc given min A to improve word finding.   -sharp x 10 Independently in 60 seconds; x 15 Independently in 90 seconds    9. Pt will participate in mental manipulation tasks with 80% acc given min A to address attention.   -Not addressed this session.     10. Pt will participate in articulation hierarchy exercises with 80% acc given min A to improve dysarthria.   -Not addressed this session.     11. Pt will named uncommon objects with 90% acc given min A to improve word finding skills.   -Not addressed this session.     12. Pt will read 3-5 word sentences with 85% acc given min A.   -read 3 word sentences x 10 with 100% acc   -read 4 word sentences x 10 with 100% acc  -read 5 word sentences x 10 with 90% acc  -read 6 word sentences x 10 with 80% acc    13. Pt will participate in organization and attention tasks with 80% acc given min A.  -attention/organization worksheet with 60% acc Independently     Long Term Goals   1. Pt will participate in vocal function exercises to improve overall quality of voice with min A.   2. Pt will participate in delayed recall, memory strategies, sequencing, attention tasks to improve overall cognition with min A.     Assessment:     Mild dysarthria c/b slow rate of speech and imprecise articulation. Moderate dysphonia noted c/b decreased breath  "support, pitch variability, tremor, pitch breaks during sustained phonation and pitch glides. Speech intelligibility %. Mild dysphagia based on pt's report of coughing on solids and thin liquids occasionaly; will assess further. Mild cognitive-linguistic deficits c/b impairments in attention, language, spelling, and delayed recall. Mild expressive and receptive aphasia c/b difficulty with complex yes/no questions and occasional word-finding deficits. Possible R side hearing loss c/b "muffled sound" and difficulty hearing per pt report. Patient will benefit from outpatient neurological rehabilitation speech therapy.    Patient Education/Response:     Educated pt on goals and plan of care. Pt reported understanding.     Plans and Goals:     Note: pt would like to add a goal for rhyming     Continue POC.   Updated POC due 12/5/17.    Christel Aguayo M.S.CCC-SLP  Speech Language Pathologist         "

## 2017-10-24 ENCOUNTER — CLINICAL SUPPORT (OUTPATIENT)
Dept: REHABILITATION | Facility: HOSPITAL | Age: 27
End: 2017-10-24
Attending: FAMILY MEDICINE
Payer: MEDICAID

## 2017-10-24 DIAGNOSIS — R49.0 DYSPHONIA: ICD-10-CM

## 2017-10-24 DIAGNOSIS — R41.89 COGNITIVE DEFICITS: ICD-10-CM

## 2017-10-24 DIAGNOSIS — R47.1 DYSARTHRIA: ICD-10-CM

## 2017-10-24 PROCEDURE — 92507 TX SP LANG VOICE COMM INDIV: CPT | Mod: PN

## 2017-10-24 PROCEDURE — 97532 *HC SP COG SKL DEV EA 15 MIN: CPT | Mod: PN

## 2017-10-24 NOTE — PROGRESS NOTES
OUTPATIENT REHABILITATION  SPEECH THERAPY PROGRESS NOTE    Date:  10/24/2017     Start Time:  1015  Stop Time:  1100    Subjective/History  Onset Date:  NF type I, Is congenitial  Primary Diagnosis:  Astrocytoma  Treatment Diagnosis:  Dysphonia, dysarthria, dysphagia  Referring Provider:  Dr. Fiordaliza Marquez  Orders: ST for evaluation and treat  Current Medical History:  Kadie Mccann presents to the [facility] secondary to the diagnosis of astrocytoma.  Pt has reportedly undergone chemotherapy.  Size of tumor has reportedly decreased.     Past Medical History:   Past Medical History:   Diagnosis Date    Fibromatosis        TIMED  Procedure Min.   Cognitive tx  25 minutes                 UNTIMED  Procedure Min.   Swallow tx  0 minutes   Speech/language tx  20 minutes     Total Minutes: 45  Total Timed Units: 0  Total Untimed Units: 1  Charges Billed/# of units: 1    Visit #: 6  Date of Evaluation: 10/5/17    Progress/Current Status    Subjective:     Pain: 0 /10  Pt pleasant. No pain reported. Pleasant.     Objective:     Short Term Goals   1. Pt will participate in a clinical swallow evaluation to assess swallow function. GOAL MET    2. Pt will answer complex yes/no questions with 80% acc given min A to address auditory comprehension. GOAL MET    3. Pt will sequence 3-4 steps with 80% acc given min A to improve cognition.   -sequencing 3 step scenarios -Not addressed this session.   -sequencing 4 step scenarios -Not addressed this session.   -written sequencing 4 steps - -Not addressed this session.     4. Pt will spell 4-6 letter words with 80% acc given min A to improve language skills.   -Not addressed this session.     5. Pt will participate in vocal function exercises (sustained phonation, pitch glides, etc) x 5 each with min A to improve vocal quality.   -pitch glides /a/ and /i/  each with min-mod A; able to achieve high and low pitch; fluctuation noted  during pitch glides; no significant pitch breaks noted. Tremor noted.   -sustained /a/ -3, 13 seconds  -sustained /i/ -28 seconds  -sustained /m/ -28 seconds  -sustained /u/ for 23 seconds  -As length of phonation increased, pt's volume decreased.    -resonant therapy exercises /m/, /b/, /p/ x 45 total    6. Pt will recall/utilize memory strategies (WRAP) with min A to assist in delayed recall.   -reviewed  -max A    7. Pt will participate in delayed recall of 3 words given min A utilizing memory strategies.   -Not addressed this session.   -BAM (banana, apple, marbin) and OMG (olive oil, milk, garlic)    8. Pt will name 15 abstract category members in 60 seconds with 80% acc given min A to improve word finding.   -things that fly: x 10 Independently; x 12 min A    9. Pt will participate in mental manipulation tasks with 80% acc given min A to address attention.   -Not addressed this session.     10. Pt will participate in articulation hierarchy exercises with 80% acc given min A to improve dysarthria.   -Not addressed this session.     11. Pt will named uncommon objects with 90% acc given min A to improve word finding skills.   -Not addressed this session.     12. Pt will read 3-5 word sentences with 85% acc given min A.   -read 3 word sentences x 10 with 100% acc   -read 4 word sentences x 10 with 90% acc Independently, 100% acc given min A  -read 5 word sentences x 10 with 100% acc Independently   -read 6 word sentences x 10 with 90% acc Independently, 100% acc given min A  -Pt self-corrected mispronounced words x 4 when reading.  Pt's voice demonstrated tremor during task.     13. Pt will participate in organization and attention tasks with 80% acc given min A.  -written sequencing of 10 alphabetical names with 100% acc Independently   -organizing events on a calendar; increased time; asked for clarification x 2    Long Term Goals   1. Pt will participate in vocal function exercises to improve overall quality of  "voice with min A.   2. Pt will participate in delayed recall, memory strategies, sequencing, attention tasks to improve overall cognition with min A.     Assessment:     Mild dysarthria c/b slow rate of speech and imprecise articulation. Moderate dysphonia noted c/b decreased breath support, pitch variability, tremor, pitch breaks during sustained phonation and pitch glides. Speech intelligibility %. Mild dysphagia based on pt's report of coughing on solids and thin liquids occasionaly; will assess further. Mild cognitive-linguistic deficits c/b impairments in attention, language, spelling, and delayed recall. Mild expressive and receptive aphasia c/b difficulty with complex yes/no questions and occasional word-finding deficits. Possible R side hearing loss c/b "muffled sound" and difficulty hearing per pt report. Patient will benefit from outpatient neurological rehabilitation speech therapy.    Patient Education/Response:     Educated pt on goals and plan of care. Pt reported understanding.     Plans and Goals:     Note: pt would like to add a goal for rhyming     Continue POC.   Updated POC due 12/5/17.    Christel Aguayo M.S.CCC-SLP  Speech Language Pathologist         "

## 2017-10-26 ENCOUNTER — CLINICAL SUPPORT (OUTPATIENT)
Dept: REHABILITATION | Facility: HOSPITAL | Age: 27
End: 2017-10-26
Attending: FAMILY MEDICINE
Payer: MEDICAID

## 2017-10-26 DIAGNOSIS — R41.89 COGNITIVE DEFICITS: ICD-10-CM

## 2017-10-26 DIAGNOSIS — R49.0 DYSPHONIA: ICD-10-CM

## 2017-10-26 DIAGNOSIS — R47.1 DYSARTHRIA: ICD-10-CM

## 2017-10-26 PROCEDURE — 92507 TX SP LANG VOICE COMM INDIV: CPT | Mod: PN

## 2017-10-26 NOTE — PROGRESS NOTES
OUTPATIENT REHABILITATION  SPEECH THERAPY PROGRESS NOTE    Date:  10/26/2017     Start Time:  1150  Stop Time:  1230    Subjective/History  Onset Date:  NF type I, Is congenitial  Primary Diagnosis:  Astrocytoma  Treatment Diagnosis:  Dysphonia, dysarthria, dysphagia  Referring Provider:  Dr. Fiordaliza Marquez  Orders: ST for evaluation and treat  Current Medical History:  Kadie Mccann presents to the [facility] secondary to the diagnosis of astrocytoma.  Pt has reportedly undergone chemotherapy.  Size of tumor has reportedly decreased.     Past Medical History:   Past Medical History:   Diagnosis Date    Fibromatosis        TIMED  Procedure Min.   Cognitive tx  0 minutes                 UNTIMED  Procedure Min.   Swallow tx  0 minutes   Speech/language tx  40 minutes     Total Minutes: 45  Total Timed Units: 0  Total Untimed Units: 1  Charges Billed/# of units: 1    Visit #: 7  Date of Evaluation: 10/5/17    Progress/Current Status    Subjective:     Pain: 0 /10  Pt pleasant. No pain reported. Pleasant. Pt requested a new exercise band to do her PT exercises at home because hers broke.    Objective:     Short Term Goals   1. Pt will participate in a clinical swallow evaluation to assess swallow function. GOAL MET    2. Pt will answer complex yes/no questions with 80% acc given min A to address auditory comprehension. GOAL MET    3. Pt will sequence 3-4 steps with 80% acc given min A to improve cognition.   -sequencing 3 step scenarios -Not addressed this session.   -sequencing 4 step scenarios -Not addressed this session.   -written sequencing 4 steps - -Not addressed this session.     4. Pt will spell 4-6 letter words with 80% acc given min A to improve language skills.   -4 letter words with 100% acc (5/5)  -5 letter words with 80% acc (4/5)  -6 letter words with 80% acc (4/5)    5. Pt will participate in vocal function exercises (sustained phonation, pitch glides,  etc) x 5 each with min A to improve vocal quality.   -pitch glides /a/ and /i/  each with min-mod A; able to achieve high and low pitch; fluctuation noted during pitch glides; no significant pitch breaks noted. Tremor noted.   -sustained /a/ --Not addressed this session.   -sustained /i/ -Not addressed this session.   -sustained /m/ -Not addressed this session.   -sustained /u/ -Not addressed this session.   -As length of phonation increased, pt's volume decreased.    -resonant therapy exercises /m/, /b/, /p/ x 45 total    6. Pt will recall/utilize memory strategies (WRAP) with min A to assist in delayed recall.   -reviewed  -max A    7. Pt will participate in delayed recall of 3 words given min A utilizing memory strategies.   -Not addressed this session.   -BAM (banana, apple, marbin) and OMG (olive oil, milk, garlic)    8. Pt will name 15 abstract category members in 60 seconds with 80% acc given min A to improve word finding.   -Not addressed this session.     9. Pt will participate in mental manipulation tasks with 80% acc given min A to address attention.   -Not addressed this session.     10. Pt will participate in articulation hierarchy exercises with 80% acc given min A to improve dysarthria.   -Not addressed this session.     11. Pt will named uncommon objects with 90% acc given min A to improve word finding skills.   -Not addressed this session.     12. Pt will read 3-5 word sentences with 85% acc given min A.   -read 3 word sentences x 10   -read 4 word sentences x 10   -read 5 word sentences x 10   -read 6 word sentences x 10 .  Pt's voice demonstrated tremor during task.     13. Pt will participate in organization and attention tasks with 80% acc given min A.  Mod A    Additional: writing simple common words in sentences with correct spelling with 83% acc (10/12)    Long Term Goals   1. Pt will participate in vocal function exercises to improve overall quality of voice with min A.   2. Pt will  "participate in delayed recall, memory strategies, sequencing, attention tasks to improve overall cognition with min A.     Assessment:     Mild dysarthria c/b slow rate of speech and imprecise articulation. Moderate dysphonia noted c/b decreased breath support, pitch variability, tremor, pitch breaks during sustained phonation and pitch glides. Speech intelligibility %. Mild dysphagia based on pt's report of coughing on solids and thin liquids occasionaly; will assess further. Mild cognitive-linguistic deficits c/b impairments in attention, language, spelling, and delayed recall. Mild expressive and receptive aphasia c/b difficulty with complex yes/no questions and occasional word-finding deficits. Possible R side hearing loss c/b "muffled sound" and difficulty hearing per pt report. Patient will benefit from outpatient neurological rehabilitation speech therapy.    Patient Education/Response:     Educated pt on goals and plan of care. Pt reported understanding.     Plans and Goals:     Note: pt would like to add a goal for rhyming     Continue POC.   Updated POC due 12/5/17.    Christel Aguayo M.S.CCC-SLP  Speech Language Pathologist         "

## 2017-10-31 ENCOUNTER — TELEPHONE (OUTPATIENT)
Dept: REHABILITATION | Facility: HOSPITAL | Age: 27
End: 2017-10-31

## 2017-11-02 ENCOUNTER — CLINICAL SUPPORT (OUTPATIENT)
Dept: REHABILITATION | Facility: HOSPITAL | Age: 27
End: 2017-11-02
Attending: FAMILY MEDICINE
Payer: MEDICAID

## 2017-11-02 DIAGNOSIS — R47.1 DYSARTHRIA: ICD-10-CM

## 2017-11-02 DIAGNOSIS — R41.89 COGNITIVE DEFICITS: ICD-10-CM

## 2017-11-02 DIAGNOSIS — R49.0 DYSPHONIA: ICD-10-CM

## 2017-11-02 PROCEDURE — 92507 TX SP LANG VOICE COMM INDIV: CPT | Mod: PN

## 2017-11-02 NOTE — PROGRESS NOTES
"OUTPATIENT REHABILITATION  SPEECH THERAPY PROGRESS NOTE    Date:  11/02/2017     Start Time:  11:10  Stop Time:  11:45    Subjective/History  Onset Date:  NF type I, Is congenitial  Primary Diagnosis:  Astrocytoma  Treatment Diagnosis:  Dysphonia, dysarthria, dysphagia  Referring Provider:  Dr. Fiordaliza Maruqez  Orders:  for evaluation and treat  Current Medical History:  Kadie Mccann presents to the [facility] secondary to the diagnosis of astrocytoma.  Pt has reportedly undergone chemotherapy.  Size of tumor has reportedly decreased.     Past Medical History:   Past Medical History:   Diagnosis Date    Fibromatosis        TIMED  Procedure Min.   Cognitive tx  0 minutes                 UNTIMED  Procedure Min.   Swallow tx  0 minutes   Speech/language tx  35 minutes     Total Minutes: 40  Total Timed Units: 0  Total Untimed Units: 1  Charges Billed/# of units: 1    Visit #: 8  Date of Evaluation: 10/5/17    Progress/Current Status    Subjective:     Pain: 0 /10  Pt pleasant. No pain reported. Pleasant. Pt's new male , Gerard Cordova, accompanied her to therapy today and waited in the lobby. Pt met AFR[place] a week ago at the park with her son and reported they have been "inseperable" ever since. He has been accompanying pt to her appointments. She stated she was glad he waited in the waiting room. Therapist urged client to be cautious due to her spontaneity and decision-making associated with her condition.  Pt reports that her sister is also concerned about her new .      Objective:     Short Term Goals   1. Pt will participate in a clinical swallow evaluation to assess swallow function. GOAL MET    2. Pt will answer complex yes/no questions with 80% acc given min A to address auditory comprehension. GOAL MET    3. Pt will sequence 3-4 steps with 80% acc given min A to improve cognition.   -sequencing 3 step scenarios -Not addressed this session. "   -sequencing 4 step scenarios -Not addressed this session.   -written sequencing 4 steps - -Not addressed this session.     4. Pt will spell 4-6 letter words with 80% acc given min A to improve language skills.   -4 letter words with 100% acc (5/5)  -5 letter words with 60% acc (3/5)  -6 letter words with 67% acc (4/6)    5. Pt will participate in vocal function exercises (sustained phonation, pitch glides, etc) x 5 each with min A to improve vocal quality.   -pitch glides /a/ and /i/  each with min-mod A; able to achieve high and low pitch; fluctuation noted during pitch glides; no significant pitch breaks noted. Tremor noted.   -sustained /a/ --Not addressed this session.   -sustained /i/ -Not addressed this session.   -sustained /m/ -Not addressed this session.   -sustained /u/ -Not addressed this session.   -As length of phonation increased, pt's volume decreased.    -resonant therapy exercises /m/, /b/, /p/ x 45 total    6. Pt will recall/utilize memory strategies (WRAP) with min A to assist in delayed recall.   -reviewed  -max A    7. Pt will participate in delayed recall of 3 words given min A utilizing memory strategies.   -Not addressed this session.   -BAM (banana, apple, marbin) and OMG (olive oil, milk, garlic)    8. Pt will name 15 abstract category members in 60 seconds with 80% acc given min A to improve word finding.   -Halloween x 10 Independently in 60 seconds  -yellow x 5 Independently in 60 seconds; x 10 mod A untimed  -turn x 4 Independently in 60 seconds; x 8 mod A untimed     9. Pt will participate in mental manipulation tasks with 80% acc given min A to address attention.   -Not addressed this session.     10. Pt will participate in articulation hierarchy exercises with 80% acc given min A to improve dysarthria.   -Not addressed this session.     11. Pt will named uncommon objects with 90% acc given min A to improve word finding skills.   -Not addressed this session.     12. Pt will read 3-5  "word sentences with 85% acc given min A.   -read 3 word sentences -Not addressed this session.   -read 4 word sentences -Not addressed this session.   -read 5 word sentences -Not addressed this session.   -read 6 word sentences -Not addressed this session.     13. Pt will participate in organization and attention tasks with 80% acc given min A.  -Not addressed this session.       Long Term Goals   1. Pt will participate in vocal function exercises to improve overall quality of voice with min A.   2. Pt will participate in delayed recall, memory strategies, sequencing, attention tasks to improve overall cognition with min A.     Assessment:     Mild dysarthria c/b slow rate of speech and imprecise articulation. Moderate dysphonia noted c/b decreased breath support, pitch variability, tremor, pitch breaks during sustained phonation and pitch glides. Speech intelligibility %. Mild dysphagia based on pt's report of coughing on solids and thin liquids occasionaly; will assess further. Mild cognitive-linguistic deficits c/b impairments in attention, language, spelling, and delayed recall. Mild expressive and receptive aphasia c/b difficulty with complex yes/no questions and occasional word-finding deficits. Possible R side hearing loss c/b "muffled sound" and difficulty hearing per pt report. Patient will benefit from outpatient neurological rehabilitation speech therapy.    Patient Education/Response:     Educated pt on goals and plan of care. Pt reported understanding.     Plans and Goals:     Note: pt would like to add a goal for rhyming     Continue POC.   Updated POC due 12/5/17.  Referred pt to ENT for assessment of vocal fold function and audiologist for a hearing test.    Christel Aguayo M.S.CCC-SLP  Speech Language Pathologist         "

## 2017-11-07 ENCOUNTER — OFFICE VISIT (OUTPATIENT)
Dept: FAMILY MEDICINE | Facility: HOSPITAL | Age: 27
End: 2017-11-07
Payer: MEDICAID

## 2017-11-07 VITALS
SYSTOLIC BLOOD PRESSURE: 144 MMHG | BODY MASS INDEX: 30.82 KG/M2 | DIASTOLIC BLOOD PRESSURE: 85 MMHG | WEIGHT: 142.44 LBS | HEART RATE: 111 BPM

## 2017-11-07 DIAGNOSIS — E66.9 OBESITY (BMI 30.0-34.9): ICD-10-CM

## 2017-11-07 DIAGNOSIS — Z00.00 HEALTH MAINTENANCE EXAMINATION: Primary | ICD-10-CM

## 2017-11-07 DIAGNOSIS — J06.9 UPPER RESPIRATORY TRACT INFECTION, UNSPECIFIED TYPE: ICD-10-CM

## 2017-11-07 DIAGNOSIS — R47.9 SPEECH DISTURBANCE, UNSPECIFIED TYPE: Primary | ICD-10-CM

## 2017-11-07 DIAGNOSIS — H91.90 HEARING DIFFICULTY, UNSPECIFIED LATERALITY: ICD-10-CM

## 2017-11-07 DIAGNOSIS — C71.9 ASTROCYTOMA: ICD-10-CM

## 2017-11-07 DIAGNOSIS — N30.01 ACUTE CYSTITIS WITH HEMATURIA: ICD-10-CM

## 2017-11-07 LAB
BILIRUB SERPL-MCNC: NEGATIVE MG/DL
BLOOD URINE, POC: ABNORMAL
COLOR, POC UA: YELLOW
GLUCOSE UR QL STRIP: NORMAL
KETONES UR QL STRIP: NEGATIVE
LEUKOCYTE ESTERASE URINE, POC: ABNORMAL
NITRITE, POC UA: NEGATIVE
PH, POC UA: 8
PROTEIN, POC: NEGATIVE
SPECIFIC GRAVITY, POC UA: 1
UROBILINOGEN, POC UA: NORMAL

## 2017-11-07 PROCEDURE — 81002 URINALYSIS NONAUTO W/O SCOPE: CPT | Performed by: FAMILY MEDICINE

## 2017-11-07 PROCEDURE — 99213 OFFICE O/P EST LOW 20 MIN: CPT | Performed by: FAMILY MEDICINE

## 2017-11-07 RX ORDER — FLUTICASONE PROPIONATE 50 MCG
1 SPRAY, SUSPENSION (ML) NASAL DAILY
Qty: 1 BOTTLE | Refills: 0 | Status: SHIPPED | OUTPATIENT
Start: 2017-11-07 | End: 2017-11-28

## 2017-11-07 NOTE — PROGRESS NOTES
Subjective:       Patient ID: Kadie Mccann is a 27 y.o. female.    Chief Complaint: URI    HPI   28 yo female, w/ h/o astrocytoma grade II s/p chemo/radiation (last course was in 11/2016), presents for urgent visit. Patient states she has a 3 day history of prod cough, sore throat, and nasal congestion. Denies f/c, malaise, or body aches. She states she has difficulty urinating and would like a UA. States she went to neurosurgery who stated her tumor is shrinking. She states she is doing well otherwise. She follows speech therapy for her abnormal speech. She stated she would like a pap smear even though she had one 1 year ago.    Review of Systems   Constitutional: Negative for chills and fever.   HENT: Positive for congestion, hearing loss, sinus pain, sinus pressure and sore throat.    Respiratory: Negative for shortness of breath and wheezing.    Cardiovascular: Negative for chest pain, palpitations and leg swelling.   Gastrointestinal: Negative for abdominal pain, constipation, diarrhea, nausea and vomiting.   Genitourinary: Negative for dysuria.   Neurological: Negative for dizziness and headaches.       Objective:      Vitals:    11/07/17 1527   BP: (!) 144/85   Pulse: (!) 111     Physical Exam   Constitutional: She is oriented to person, place, and time. She appears well-developed and well-nourished.   HENT:   Head: Normocephalic and atraumatic.   Eyes: Conjunctivae are normal.   Neck: Neck supple.   Cardiovascular: Normal rate, regular rhythm, normal heart sounds and intact distal pulses.  Exam reveals no gallop and no friction rub.    No murmur heard.  Pulmonary/Chest: Effort normal and breath sounds normal. She has no wheezes. She has no rales.   Abdominal: Soft. Bowel sounds are normal. She exhibits no distension. There is no tenderness.   Musculoskeletal: She exhibits no edema.   Neurological: She is alert and oriented to person, place, and time.   Skin: Skin is warm and dry.   Psychiatric: She has  a normal mood and affect. Her behavior is normal. Judgment and thought content normal.       Assessment:       1. Health maintenance examination    2. Obesity (BMI 30.0-34.9)    3. Upper respiratory tract infection, unspecified type    4. Acute cystitis with hematuria        Plan:       Obesity (BMI 30.0-34.9)  - Patient stated she is having trouble losing weight. Would like lab work done.  -     TSH; Future; Expected date: 11/21/2017  -     Lipid panel; Future; Expected date: 11/21/2017  -     CBC auto differential; Future; Expected date: 11/21/2017  -     Hemoglobin A1c; Future; Expected date: 11/21/2017    Upper respiratory tract infection, unspecified type  -     fluticasone (FLONASE) 50 mcg/actuation nasal spray; 1 spray by Each Nare route once daily.  Dispense: 1 Bottle; Refill: 0  -     dextromethorphan-guaifenesin  mg (MUCINEX DM)  mg per 12 hr tablet; Take 1 tablet by mouth 2 (two) times daily.  Dispense: 60 tablet; Refill: 0  - Advised patient to f/u in 1 week if symptoms worsening. Will have a low threshold for abx tx due to her being immunocompromised    Acute cystitis with hematuria  - UA was wnl. Will check UC before tx.  -     Urine culture  -     POCT URINE DIPSTICK WITHOUT MICROSCOPE    Patient wants a female for her pap smear.    RTC 1 day

## 2017-11-07 NOTE — PROGRESS NOTES
I have reviewed the notes, assessments, and/or procedures performed by Dr. Kern, I concur with her/his documentation of Kadie Mccann.

## 2017-11-09 ENCOUNTER — CLINICAL SUPPORT (OUTPATIENT)
Dept: REHABILITATION | Facility: HOSPITAL | Age: 27
End: 2017-11-09
Attending: FAMILY MEDICINE
Payer: MEDICAID

## 2017-11-09 DIAGNOSIS — R49.0 DYSPHONIA: ICD-10-CM

## 2017-11-09 DIAGNOSIS — R41.89 COGNITIVE DEFICITS: ICD-10-CM

## 2017-11-09 DIAGNOSIS — R47.1 DYSARTHRIA: ICD-10-CM

## 2017-11-09 PROCEDURE — 92507 TX SP LANG VOICE COMM INDIV: CPT | Mod: PN

## 2017-11-09 NOTE — PROGRESS NOTES
OUTPATIENT REHABILITATION  SPEECH THERAPY PROGRESS NOTE    Date:  11/09/2017     Start Time:  11:00  Stop Time:  11:45    Subjective/History  Onset Date:  NF type I, Is congenitial  Primary Diagnosis:  Astrocytoma  Treatment Diagnosis:  Dysphonia, dysarthria, dysphagia  Referring Provider:  Dr. Fiordaliza Marquez  Orders:  for evaluation and treat  Current Medical History:  Kadie Mccann presents to the [facility] secondary to the diagnosis of astrocytoma.  Pt has reportedly undergone chemotherapy.  Size of tumor has reportedly decreased.     Past Medical History:   Past Medical History:   Diagnosis Date    Fibromatosis        TIMED  Procedure Min.   Cognitive tx  0 minutes                 UNTIMED  Procedure Min.   Swallow tx  0 minutes   Speech/language tx  45 minutes     Total Minutes:   Total Timed Units: 0  Total Untimed Units: 1  Charges Billed/# of units: 1    Visit #: 9  Date of Evaluation: 10/5/17    Progress/Current Status    Subjective:     Pain: 0 /10  Pt pleasant. No pain reported.     Objective:     Short Term Goals   1. Pt will participate in a clinical swallow evaluation to assess swallow function. GOAL MET    2. Pt will answer complex yes/no questions with 80% acc given min A to address auditory comprehension. GOAL MET    3. Pt will sequence 3-4 steps with 80% acc given min A to improve cognition.   -sequencing 3 step scenarios -Not addressed this session.   -sequencing 4 step scenarios -Not addressed this session.   -written sequencing 4 steps - -Not addressed this session.     4. Pt will spell 4-6 letter words with 80% acc given min A to improve language skills.   -4 letter words -Not addressed this session.    -5 letter words with 60% acc (3/5) Independently, 80% acc (4/5) given min A  -6 letter words -Not addressed this session.     5. Pt will participate in vocal function exercises (sustained phonation, pitch glides, etc) x 5 each with min A to  improve vocal quality.   -pitch glides /a/ and /i/  -Not addressed this session.   -sustained /a/ --Not addressed this session.   -sustained /i/ -Not addressed this session.   -sustained /m/ -Not addressed this session.   -sustained /u/ -Not addressed this session.   -As length of phonation increased, pt's volume decreased.    -resonant therapy exercises /m/, /b/, /p/ -Not addressed this session.     6. Pt will recall/utilize memory strategies (WRAP) with min A to assist in delayed recall.   -reviewed  -max A    7. Pt will participate in delayed recall of 3 words given min A utilizing memory strategies.   -OMG (olive oil, milk, garlic) 100% (3/3) recalled Independently  -BAM (banana, apple, marbin) -Not addressed this session.     8. Pt will name 15 abstract category members in 60 seconds with 80% acc given min A to improve word finding.   -Not addressed this session.     9. Pt will participate in mental manipulation tasks with 80% acc given min A to address attention.   -word order: 60% acc (3/5) Independently, 80% acc (4/5) given min A  -self-correct x 1    10. Pt will participate in articulation hierarchy exercises with 80% acc given min A to improve dysarthria.   -Not addressed this session.     11. Pt will named uncommon objects with 90% acc given min A to improve word finding skills.   -Not addressed this session.     12. Pt will read 3-5 word sentences with 85% acc given min A.   -read 3 word sentences -Not addressed this session.   -read 4 word sentences -Not addressed this session.   -read 5 word sentences -Not addressed this session.   -read 6 word sentences -Not addressed this session.     13. Pt will participate in organization and attention tasks with 80% acc given min A.  -Not addressed this session.     14. Pt will participate in immediate/recent recall given 3-5 words given min A with 80% acc.  -Recall of three words: 60% acc (3/5) Independently, 100% acc (5/5) given min A  -min A given by repeating  "3-word set for pt    15. Pt will list words that rhyme with 80% acc given min A to improve word finding.   -Given a visual picture and written word, pt listed rhyming words with: 52% acc (17/32) Independently; 88% acc with min-mod A (28/32)  -pt requested to work on rhyming      Long Term Goals   1. Pt will participate in vocal function exercises to improve overall quality of voice with min A.   2. Pt will participate in delayed recall, memory strategies, sequencing, attention tasks to improve overall cognition with min A.     Assessment:     Mild dysarthria c/b slow rate of speech and imprecise articulation. Moderate dysphonia noted c/b decreased breath support, pitch variability, tremor, pitch breaks during sustained phonation and pitch glides. Speech intelligibility %. Mild dysphagia based on pt's report of coughing on solids and thin liquids occasionaly; will assess further. Mild cognitive-linguistic deficits c/b impairments in attention, language, spelling, and delayed recall. Mild expressive and receptive aphasia c/b difficulty with complex yes/no questions and occasional word-finding deficits. Possible R side hearing loss c/b "muffled sound" and difficulty hearing per pt report. Patient will benefit from outpatient neurological rehabilitation speech therapy.    Patient Education/Response:     Educated pt on goals and plan of care. Pt reported understanding.     Plans and Goals:     Added two goals for immediate recall and rhyming.     Continue POC.   Updated POC due 12/5/17.  Referred pt to ENT for assessment of vocal fold function and audiologist for a hearing test. Pt reported that she is going to ENT/audiologist next week.    Christel Aguayo M.S.CCC-SLP  Speech Language Pathologist         "

## 2017-11-14 ENCOUNTER — CLINICAL SUPPORT (OUTPATIENT)
Dept: REHABILITATION | Facility: HOSPITAL | Age: 27
End: 2017-11-14
Attending: FAMILY MEDICINE
Payer: MEDICAID

## 2017-11-14 DIAGNOSIS — R41.89 COGNITIVE DEFICITS: ICD-10-CM

## 2017-11-14 DIAGNOSIS — R47.1 DYSARTHRIA: ICD-10-CM

## 2017-11-14 DIAGNOSIS — R49.0 DYSPHONIA: ICD-10-CM

## 2017-11-14 PROCEDURE — 97532 *HC SP COG SKL DEV EA 15 MIN: CPT | Mod: PN

## 2017-11-14 NOTE — PROGRESS NOTES
OUTPATIENT REHABILITATION  SPEECH THERAPY PROGRESS NOTE    Date:  11/14/2017     Start Time:  11:45  Stop Time:  12:40    Subjective/History  Onset Date:  NF type I, Is congenitial  Primary Diagnosis:  Astrocytoma  Treatment Diagnosis:  Dysphonia, dysarthria, dysphagia  Referring Provider:  Dr. Fiordaliza Marquez  Orders: ST for evaluation and treat  Current Medical History:  Kadie Mccann presents to the [facility] secondary to the diagnosis of astrocytoma.  Pt has reportedly undergone chemotherapy.  Size of tumor has reportedly decreased.     Past Medical History:   Past Medical History:   Diagnosis Date    Fibromatosis        TIMED  Procedure Min.   Cognitive tx 55 minutes                 UNTIMED  Procedure Min.   Swallow tx  0 minutes   Speech/language tx  0 minutes     Total Minutes: 55  Total Timed Units: 0  Total Untimed Units: 1  Charges Billed/# of units: 1    Visit #: 10  Date of Evaluation: 10/5/17    Progress/Current Status     Subjective:     Pain: 0 /10  Pt pleasant. No pain reported. Pt walking without cane today. Pt returned homework completed and reviewed.    Homework, deductive reasoning worksheet: 71% acc (20/28) independently; 98% acc (27/28) given min-mod A    Objective:     Short Term Goals   1. Pt will participate in a clinical swallow evaluation to assess swallow function. GOAL MET    2. Pt will answer complex yes/no questions with 80% acc given min A to address auditory comprehension. GOAL MET    3. Pt will sequence 3-4 steps with 80% acc given min A to improve cognition.   -sequencing 3 step scenarios -100% acc (6/6) independently  -sequencing 4 step scenarios -100% acc (55) independently   -written sequencing 4 steps-50% acc (4/8) independently, 88% acc (7/8) given min-mod A  -written sequencing 5 steps-25% acc (1/4) independently, 100% acc (4/4) given min-mod A    4. Pt will spell 4-6 letter words with 80% acc given min A to improve language  skills.   -written words - simple common vocabulary words  -4 letter words -100% acc independently   -5 letter words -80% acc independently   -6 letter words -100% acc independently     5. Pt will participate in vocal function exercises (sustained phonation, pitch glides, etc) x 5 each with min A to improve vocal quality.   -pitch glides /a/ and /i/  -Not addressed this session.   -sustained /a/ --Not addressed this session.   -sustained /i/ -Not addressed this session.   -sustained /m/ -Not addressed this session.   -sustained /u/ -Not addressed this session.    -resonant therapy exercises /m/, /b/, /p/ -Not addressed this session.     6. Pt will recall/utilize memory strategies (WRAP) with min A to assist in delayed recall.   -reviewed  -max A    7. Pt will participate in delayed recall of 3 words given min A utilizing memory strategies.   -OMG (olive oil, milk, garlic) 100% (3/3) recalled Independently  -BAM (banana, apple, marbin) -67% acc (2/3) given min A    8. Pt will name 15 abstract category members in 60 seconds with 80% acc given min A to improve word finding.   -Not addressed this session.     9. Pt will participate in mental manipulation tasks with 80% acc given min A to address attention.   -Not addressed this session.     10. Pt will participate in articulation hierarchy exercises with 80% acc given min A to improve dysarthria.   -Not addressed this session.     11. Pt will named uncommon objects with 90% acc given min A to improve word finding skills.   -Not addressed this session.     12. Pt will read 3-5 word sentences with 85% acc given min A.   -read 3 word sentences -Not addressed this session.   -read 4 word sentences -Not addressed this session.   -read 5 word sentences -Not addressed this session.   -read 6 word sentences -Not addressed this session.     13. Pt will participate in organization and attention tasks with 80% acc given min A.  -Alphabetical order: 70% acc (7/10) independently,  "100% acc (10/10) given min A; noted difficulty sequencing numbers in order 1-10    14. Pt will participate in immediate/recent recall given 3-5 words given min A with 80% acc.  -Recall of three words: Not addressed this session.     15. Pt will list words that rhyme with 80% acc given min A to improve word finding.   -Given a visual picture and written word, pt listed rhyming words with: Not addressed this session.     Long Term Goals   1. Pt will participate in vocal function exercises to improve overall quality of voice with min A.   2. Pt will participate in delayed recall, memory strategies, sequencing, attention tasks to improve overall cognition with min A.     Assessment:     Mild dysarthria c/b slow rate of speech and imprecise articulation. Moderate dysphonia noted c/b decreased breath support, pitch variability, tremor, pitch breaks during sustained phonation and pitch glides. Speech intelligibility %. Mild dysphagia based on pt's report of coughing on solids and thin liquids occasionaly; will assess further. Mild cognitive-linguistic deficits c/b impairments in attention, language, spelling, and delayed recall. Mild expressive and receptive aphasia c/b difficulty with complex yes/no questions and occasional word-finding deficits. Possible R side hearing loss c/b "muffled sound" and difficulty hearing per pt report. Patient will benefit from outpatient neurological rehabilitation speech therapy.    Patient Education/Response:     Educated pt on goals and plan of care. Pt reported understanding.     Plans and Goals:     Continue POC.   Updated POC due 12/5/17.  Referred pt to ENT for assessment of vocal fold function and audiologist for a hearing test. Scheduled for January 2, 2018.    Christel Aguayo M.S.CCC-SLP  Speech Language Pathologist         "

## 2017-11-16 ENCOUNTER — CLINICAL SUPPORT (OUTPATIENT)
Dept: REHABILITATION | Facility: HOSPITAL | Age: 27
End: 2017-11-16
Attending: FAMILY MEDICINE
Payer: MEDICAID

## 2017-11-16 DIAGNOSIS — R41.89 COGNITIVE DEFICITS: ICD-10-CM

## 2017-11-16 DIAGNOSIS — R49.0 DYSPHONIA: ICD-10-CM

## 2017-11-16 DIAGNOSIS — R47.1 DYSARTHRIA: ICD-10-CM

## 2017-11-16 PROCEDURE — 92507 TX SP LANG VOICE COMM INDIV: CPT | Mod: PN

## 2017-11-16 NOTE — PROGRESS NOTES
OUTPATIENT REHABILITATION  SPEECH THERAPY PROGRESS NOTE    Date:  11/16/2017     Start Time:  11:00  Stop Time:  12:00    Subjective/History  Onset Date:  NF type I, Is congenitial  Primary Diagnosis:  Astrocytoma  Treatment Diagnosis:  Dysphonia, dysarthria, dysphagia  Referring Provider:  Dr. Fiordaliza Marquez  Orders: ST for evaluation and treat  Current Medical History:  Kadie Mccann presents to the [facility] secondary to the diagnosis of astrocytoma.  Pt has reportedly undergone chemotherapy.  Size of tumor has reportedly decreased.     Past Medical History:   Past Medical History:   Diagnosis Date    Fibromatosis        TIMED  Procedure Min.   Cognitive tx 0 minutes                 UNTIMED  Procedure Min.   Swallow tx  0 minutes   Speech/language tx  60 minutes     Total Minutes: 60  Total Timed Units: 0  Total Untimed Units: 1  Charges Billed/# of units: 1    Visit #: 11  Date of Evaluation: 10/5/17    Progress/Current Status     Subjective:     Pain: 0 /10  Pt pleasant. No pain reported. Pt demonstrated significant improvement today. Pt's cousin, Adelaida, was present during today's session. Pt reported feeling well-rested today which may have contributed to her performance.       Objective:     Short Term Goals   1. Pt will participate in a clinical swallow evaluation to assess swallow function. GOAL MET    2. Pt will answer complex yes/no questions with 80% acc given min A to address auditory comprehension. GOAL MET    3. Pt will sequence 3-4 steps with 80% acc given min A to improve cognition. GOAL MET  -sequencing 3 step scenarios -GOAL MET 11/14   -sequencing 4 step scenarios -GOAL MET 11/14  -written sequencing 4 steps-50% acc (2/4) independently   -written sequencing 5 steps-75% acc (3/4) given min A; pt self-corrected x 1    4. Pt will spell 4-6 letter words with 80% acc given min A to improve language skills.   -written words - simple common  vocabulary words  -4 letter words - Not addressed this session.   -5 letter words - 100% acc (5/5) independently   -6 letter words - 80% acc (4/5) independently     5. Pt will participate in vocal function exercises (sustained phonation, pitch glides, etc) x 5 each with min A to improve vocal quality.   -pitch glides /a/ and /i/  -Not addressed this session.   -sustained /a/ --Not addressed this session.   -sustained /i/ -Not addressed this session.   -sustained /m/ -Not addressed this session.   -sustained /u/ -Not addressed this session.    -resonant therapy exercises /m/, /b/, /p/ -Not addressed this session.     6. Pt will recall/utilize memory strategies (WRAP) with min A to assist in delayed recall.   -reviewed    7. Pt will participate in delayed recall of 3 words given min A utilizing memory strategies.   -OMG (olive oil, milk, garlic) recalled acronym and words with 100% acc independently   -BAM (banana, apple, marbin) recalled words with 100% acc independently     8. Pt will name 15 abstract category members in 60 seconds with 80% acc given min A to improve word finding. GOAL MET  -hot: x 16 in 1 min independently, x 20 given min A  -cold: x 14 in 1 min independently, x 21 given min A    9. Pt will participate in mental manipulation tasks with 80% acc given min A to address attention.   -Not addressed this session.     10. Pt will participate in articulation hierarchy exercises with 80% acc given min A to improve dysarthria.   -Not addressed this session.     11. Pt will named uncommon objects with 90% acc given min A to improve word finding skills.   -Not addressed this session.     12. Pt will read 3-5 word sentences with 85% acc given min A. GOAL MET x 1 session  -read 3 word sentences -Not addressed this session.   -read 4 word sentences -100% acc (10/10) independently    -read 5 word sentences -90% acc (9/10) independently, 100% acc (10/10) given min A  -read 6 word sentences -90% acc (9/10)  "independently, 100% acc (10/10) given min A    13. Pt will participate in organization and attention tasks with 80% acc given min A.  -Not addressed this session.     14. Pt will participate in immediate/recent recall given 3-5 words given min A with 80% acc.  -Recall of three words: Not addressed this session.     15. Pt will list words that rhyme with 80% acc given min A to improve word finding.   -Given a visual picture and written word, pt listed rhyming words with: Not addressed this session.     16. Pt will spell 4-7 word sentences with 80% acc given min A.  GOAL MET x 1 session  -4 word sentences: 100% acc (8/8 words) given min A  -7 word sentences: 86% acc (12/14 words) given min-mod A     17. Pt will participate in games in order to address attention, word-finding, and logic with 80% acc independently   -Playing Taboo, focus on describing and word-finding   Describing words: 90% acc (9/10) independently    Word-findin% acc (5/6) independently     Long Term Goals   1. Pt will participate in vocal function exercises to improve overall quality of voice with min A.   2. Pt will participate in delayed recall, memory strategies, sequencing, attention tasks to improve overall cognition with min A.     Assessment:     Mild dysarthria c/b slow rate of speech and imprecise articulation. Moderate dysphonia noted c/b decreased breath support, pitch variability, tremor, pitch breaks during sustained phonation and pitch glides. Speech intelligibility %. Mild dysphagia based on pt's report of coughing on solids and thin liquids occasionaly; will assess further. Mild cognitive-linguistic deficits c/b impairments in attention, language, spelling, and delayed recall. Mild expressive and receptive aphasia c/b difficulty with complex yes/no questions and occasional word-finding deficits. Possible R side hearing loss c/b "muffled sound" and difficulty hearing per pt report. Patient will benefit from outpatient " neurological rehabilitation speech therapy.    Patient Education/Response:     Educated pt on goals and plan of care. Pt reported understanding.     Plans and Goals:     Continue POC.   Updated POC due 12/5/17.  Pt reported that she has an ENT appointment scheduled for today.     Christel Aguayo M.S.CCC-SLP  Speech Language Pathologist

## 2017-11-20 ENCOUNTER — TELEPHONE (OUTPATIENT)
Dept: REHABILITATION | Facility: HOSPITAL | Age: 27
End: 2017-11-20

## 2017-11-21 ENCOUNTER — OFFICE VISIT (OUTPATIENT)
Dept: FAMILY MEDICINE | Facility: HOSPITAL | Age: 27
End: 2017-11-21
Attending: FAMILY MEDICINE
Payer: MEDICAID

## 2017-11-21 VITALS
SYSTOLIC BLOOD PRESSURE: 133 MMHG | HEART RATE: 91 BPM | HEIGHT: 60 IN | BODY MASS INDEX: 27.35 KG/M2 | WEIGHT: 139.31 LBS | DIASTOLIC BLOOD PRESSURE: 79 MMHG

## 2017-11-21 DIAGNOSIS — Z12.4 ROUTINE CERVICAL SMEAR: Primary | ICD-10-CM

## 2017-11-21 PROCEDURE — 99213 OFFICE O/P EST LOW 20 MIN: CPT | Performed by: STUDENT IN AN ORGANIZED HEALTH CARE EDUCATION/TRAINING PROGRAM

## 2017-11-21 NOTE — PROGRESS NOTES
I have reviewed the resident's documentation, the patient's medical history, the resident's finding on physical examination, the patient's diagnosis, and the treatment plan as developed by the resident. I have discussed this case with the resident. I was present in the room with the patient.    I attest that I did not have any responsibilities other than the supervision of residents at the time this service was provided by the resident I attest the care provided by the resident was reasonable and necessary.

## 2017-11-21 NOTE — PROGRESS NOTES
Subjective:       Patient ID: Kadie Mccann is a 27 y.o. female.    Chief Complaint: papsmear    Patient is a 28 yo female followed by Dr. Nguyen in our clinic.  She presents today for a pap smear.  She denies any abnormal bleeding, pruritis, foul odor or discharge.  LMP 10/28/2017 and normal.  Denies n/v, f/c, sob, chest pain, abdominal pain, dysuria, constipation or diarrhea      Review of Systems   Constitutional: Negative for appetite change, chills, fatigue and fever.   HENT: Negative for congestion, ear pain, hearing loss, mouth sores and tinnitus.    Eyes: Negative for pain, discharge and itching.   Respiratory: Negative for cough, choking, chest tightness and shortness of breath.    Cardiovascular: Negative for chest pain, palpitations and leg swelling.   Gastrointestinal: Negative for abdominal pain, blood in stool, constipation, diarrhea, nausea and vomiting.   Genitourinary: Negative for difficulty urinating, dysuria, enuresis, flank pain, hematuria, menstrual problem, urgency, vaginal bleeding, vaginal discharge and vaginal pain.   Musculoskeletal: Positive for gait problem. Negative for arthralgias, myalgias, neck pain and neck stiffness.   Skin: Negative for color change, pallor, rash and wound.   Neurological: Positive for speech difficulty. Negative for dizziness, tremors, weakness, light-headedness, numbness and headaches.   Psychiatric/Behavioral: Negative for agitation, behavioral problems and confusion.       Objective:      Vitals:    11/21/17 1508   BP: 133/79   Pulse: 91     Physical Exam   Constitutional: She is oriented to person, place, and time. She appears well-developed and well-nourished. No distress.   HENT:   Head: Normocephalic and atraumatic.   Mouth/Throat: Oropharynx is clear and moist. No oropharyngeal exudate.   Eyes: Conjunctivae and EOM are normal. Pupils are equal, round, and reactive to light. Right eye exhibits no discharge. Left eye exhibits no discharge. No scleral  icterus.   Neck: Normal range of motion. Neck supple. No tracheal deviation present. No thyromegaly present.   Cardiovascular: Normal rate, regular rhythm and normal heart sounds.    Pulmonary/Chest: Effort normal and breath sounds normal. No respiratory distress.   Abdominal: Soft. She exhibits no distension.   Genitourinary: Vagina normal.   Genitourinary Comments: No excoriations, lesions, or discharge noted on exam.   Musculoskeletal: Normal range of motion. She exhibits no edema.   Neurological: She is alert and oriented to person, place, and time. No cranial nerve deficit. She exhibits normal muscle tone. Coordination normal.   Skin: Skin is warm. Capillary refill takes less than 2 seconds. She is not diaphoretic. No erythema.   Psychiatric: She has a normal mood and affect. Her behavior is normal. Judgment and thought content normal.   Nursing note and vitals reviewed.      Assessment:       1. Routine cervical smear        Plan:       Routine cervical smear  -     Liquid-based pap smear, screening      Follow-up in 3 months

## 2017-11-24 ENCOUNTER — LAB VISIT (OUTPATIENT)
Dept: LAB | Facility: HOSPITAL | Age: 27
End: 2017-11-24
Attending: FAMILY MEDICINE
Payer: MEDICAID

## 2017-11-24 DIAGNOSIS — E66.9 OBESITY (BMI 30.0-34.9): ICD-10-CM

## 2017-11-24 LAB
BASOPHILS # BLD AUTO: 0.03 K/UL
BASOPHILS NFR BLD: 0.2 %
CHOLEST SERPL-MCNC: 174 MG/DL
CHOLEST/HDLC SERPL: 3.3 {RATIO}
DIFFERENTIAL METHOD: ABNORMAL
EOSINOPHIL # BLD AUTO: 0.1 K/UL
EOSINOPHIL NFR BLD: 1.1 %
ERYTHROCYTE [DISTWIDTH] IN BLOOD BY AUTOMATED COUNT: 14.9 %
ESTIMATED AVG GLUCOSE: 97 MG/DL
HBA1C MFR BLD HPLC: 5 %
HCT VFR BLD AUTO: 37 %
HDLC SERPL-MCNC: 52 MG/DL
HDLC SERPL: 29.9 %
HGB BLD-MCNC: 11.6 G/DL
LDLC SERPL CALC-MCNC: 101.6 MG/DL
LYMPHOCYTES # BLD AUTO: 2.8 K/UL
LYMPHOCYTES NFR BLD: 22.5 %
MCH RBC QN AUTO: 26.7 PG
MCHC RBC AUTO-ENTMCNC: 31.4 G/DL
MCV RBC AUTO: 85 FL
MONOCYTES # BLD AUTO: 0.7 K/UL
MONOCYTES NFR BLD: 5.7 %
NEUTROPHILS # BLD AUTO: 8.9 K/UL
NEUTROPHILS NFR BLD: 70 %
NONHDLC SERPL-MCNC: 122 MG/DL
PLATELET # BLD AUTO: 287 K/UL
PMV BLD AUTO: 10.1 FL
RBC # BLD AUTO: 4.35 M/UL
TRIGL SERPL-MCNC: 102 MG/DL
TSH SERPL DL<=0.005 MIU/L-ACNC: 2.98 UIU/ML
WBC # BLD AUTO: 12.65 K/UL

## 2017-11-24 PROCEDURE — 36415 COLL VENOUS BLD VENIPUNCTURE: CPT

## 2017-11-24 PROCEDURE — 80061 LIPID PANEL: CPT

## 2017-11-24 PROCEDURE — 83036 HEMOGLOBIN GLYCOSYLATED A1C: CPT

## 2017-11-24 PROCEDURE — 85025 COMPLETE CBC W/AUTO DIFF WBC: CPT

## 2017-11-24 PROCEDURE — 84443 ASSAY THYROID STIM HORMONE: CPT

## 2017-11-28 ENCOUNTER — HOSPITAL ENCOUNTER (EMERGENCY)
Facility: HOSPITAL | Age: 27
Discharge: HOME | End: 2017-11-28
Attending: EMERGENCY MEDICINE
Payer: MEDICAID

## 2017-11-28 VITALS
HEART RATE: 85 BPM | BODY MASS INDEX: 29.99 KG/M2 | WEIGHT: 139 LBS | RESPIRATION RATE: 18 BRPM | TEMPERATURE: 98 F | SYSTOLIC BLOOD PRESSURE: 150 MMHG | DIASTOLIC BLOOD PRESSURE: 72 MMHG | OXYGEN SATURATION: 100 % | HEIGHT: 57 IN

## 2017-11-28 DIAGNOSIS — R51.9 ACUTE NONINTRACTABLE HEADACHE, UNSPECIFIED HEADACHE TYPE: ICD-10-CM

## 2017-11-28 DIAGNOSIS — E86.0 DEHYDRATION: Primary | ICD-10-CM

## 2017-11-28 LAB
ALBUMIN SERPL BCP-MCNC: 3 G/DL
ALP SERPL-CCNC: 67 U/L
ALT SERPL W/O P-5'-P-CCNC: 8 U/L
ANION GAP SERPL CALC-SCNC: 9 MMOL/L
AST SERPL-CCNC: 10 U/L
B-HCG UR QL: NEGATIVE
BASOPHILS # BLD AUTO: 0.02 K/UL
BASOPHILS NFR BLD: 0.2 %
BILIRUB SERPL-MCNC: 0.4 MG/DL
BILIRUB UR QL STRIP: NEGATIVE
BUN SERPL-MCNC: 9 MG/DL
CALCIUM SERPL-MCNC: 8.7 MG/DL
CHLORIDE SERPL-SCNC: 107 MMOL/L
CK SERPL-CCNC: 46 U/L
CLARITY UR: CLEAR
CO2 SERPL-SCNC: 23 MMOL/L
COLOR UR: YELLOW
CREAT SERPL-MCNC: 0.6 MG/DL
CTP QC/QA: YES
DIFFERENTIAL METHOD: ABNORMAL
EOSINOPHIL # BLD AUTO: 0.2 K/UL
EOSINOPHIL NFR BLD: 1.5 %
ERYTHROCYTE [DISTWIDTH] IN BLOOD BY AUTOMATED COUNT: 15 %
EST. GFR  (AFRICAN AMERICAN): >60 ML/MIN/1.73 M^2
EST. GFR  (NON AFRICAN AMERICAN): >60 ML/MIN/1.73 M^2
GLUCOSE SERPL-MCNC: 114 MG/DL
GLUCOSE UR QL STRIP: NEGATIVE
HCT VFR BLD AUTO: 36.8 %
HGB BLD-MCNC: 11.7 G/DL
HGB UR QL STRIP: ABNORMAL
KETONES UR QL STRIP: NEGATIVE
LEUKOCYTE ESTERASE UR QL STRIP: NEGATIVE
LYMPHOCYTES # BLD AUTO: 2.5 K/UL
LYMPHOCYTES NFR BLD: 24 %
MCH RBC QN AUTO: 27.1 PG
MCHC RBC AUTO-ENTMCNC: 31.8 G/DL
MCV RBC AUTO: 85 FL
MONOCYTES # BLD AUTO: 0.6 K/UL
MONOCYTES NFR BLD: 5.4 %
NEUTROPHILS # BLD AUTO: 7.1 K/UL
NEUTROPHILS NFR BLD: 68.7 %
NITRITE UR QL STRIP: NEGATIVE
PH UR STRIP: 8 [PH] (ref 5–8)
PLATELET # BLD AUTO: 292 K/UL
PMV BLD AUTO: 9.9 FL
POCT GLUCOSE: 108 MG/DL (ref 70–110)
POTASSIUM SERPL-SCNC: 4 MMOL/L
PROT SERPL-MCNC: 6.9 G/DL
PROT UR QL STRIP: NEGATIVE
RBC # BLD AUTO: 4.32 M/UL
SODIUM SERPL-SCNC: 139 MMOL/L
SP GR UR STRIP: 1.01 (ref 1–1.03)
URN SPEC COLLECT METH UR: ABNORMAL
UROBILINOGEN UR STRIP-ACNC: NEGATIVE EU/DL
WBC # BLD AUTO: 10.3 K/UL

## 2017-11-28 PROCEDURE — 80053 COMPREHEN METABOLIC PANEL: CPT

## 2017-11-28 PROCEDURE — 85025 COMPLETE CBC W/AUTO DIFF WBC: CPT

## 2017-11-28 PROCEDURE — 63600175 PHARM REV CODE 636 W HCPCS: Performed by: EMERGENCY MEDICINE

## 2017-11-28 PROCEDURE — 96361 HYDRATE IV INFUSION ADD-ON: CPT

## 2017-11-28 PROCEDURE — 93005 ELECTROCARDIOGRAM TRACING: CPT

## 2017-11-28 PROCEDURE — 82550 ASSAY OF CK (CPK): CPT

## 2017-11-28 PROCEDURE — 81003 URINALYSIS AUTO W/O SCOPE: CPT

## 2017-11-28 PROCEDURE — 99284 EMERGENCY DEPT VISIT MOD MDM: CPT | Mod: 25

## 2017-11-28 PROCEDURE — 82962 GLUCOSE BLOOD TEST: CPT

## 2017-11-28 PROCEDURE — 96375 TX/PRO/DX INJ NEW DRUG ADDON: CPT

## 2017-11-28 PROCEDURE — 81025 URINE PREGNANCY TEST: CPT | Performed by: EMERGENCY MEDICINE

## 2017-11-28 PROCEDURE — 96374 THER/PROPH/DIAG INJ IV PUSH: CPT

## 2017-11-28 PROCEDURE — 93010 ELECTROCARDIOGRAM REPORT: CPT | Mod: ,,, | Performed by: INTERNAL MEDICINE

## 2017-11-28 PROCEDURE — 25000003 PHARM REV CODE 250: Performed by: EMERGENCY MEDICINE

## 2017-11-28 RX ORDER — ONDANSETRON 4 MG/1
4 TABLET, FILM COATED ORAL EVERY 6 HOURS PRN
Qty: 10 TABLET | Refills: 0 | Status: SHIPPED | OUTPATIENT
Start: 2017-11-28 | End: 2019-03-07

## 2017-11-28 RX ORDER — PROCHLORPERAZINE EDISYLATE 5 MG/ML
10 INJECTION INTRAMUSCULAR; INTRAVENOUS
Status: COMPLETED | OUTPATIENT
Start: 2017-11-28 | End: 2017-11-28

## 2017-11-28 RX ORDER — BUTALBITAL, ACETAMINOPHEN AND CAFFEINE 50; 325; 40 MG/1; MG/1; MG/1
1 TABLET ORAL EVERY 6 HOURS PRN
Qty: 14 TABLET | Refills: 0 | Status: SHIPPED | OUTPATIENT
Start: 2017-11-28 | End: 2017-12-28

## 2017-11-28 RX ORDER — KETOROLAC TROMETHAMINE 30 MG/ML
15 INJECTION, SOLUTION INTRAMUSCULAR; INTRAVENOUS
Status: COMPLETED | OUTPATIENT
Start: 2017-11-28 | End: 2017-11-28

## 2017-11-28 RX ORDER — SODIUM CHLORIDE 9 MG/ML
1000 INJECTION, SOLUTION INTRAVENOUS
Status: COMPLETED | OUTPATIENT
Start: 2017-11-28 | End: 2017-11-28

## 2017-11-28 RX ADMIN — PROCHLORPERAZINE EDISYLATE 10 MG: 5 INJECTION INTRAMUSCULAR; INTRAVENOUS at 01:11

## 2017-11-28 RX ADMIN — SODIUM CHLORIDE 1000 ML: 0.9 INJECTION, SOLUTION INTRAVENOUS at 01:11

## 2017-11-28 RX ADMIN — KETOROLAC TROMETHAMINE 15 MG: 30 INJECTION, SOLUTION INTRAMUSCULAR at 01:11

## 2017-11-28 NOTE — ED NOTES
Pt sitting up in bed, DUO x's 3, [facility]. Pt stated that she came to the eR with c/o generalized weakness and headache. Pt reports having a brain tumor that has shrunk with chemo but is scared that she is having these symptoms because of the tumor. Pt also stated that she thinks since having the flu shot that she feels worse. Reports blurred vision and photophobia. Denies N/V, diarrhea, CP and SOB.   APPEARANCE: Alert, oriented and in no acute distress.  CARDIAC: Normal rate and rhythm, no murmur heard.   PERIPHERAL VASCULAR: peripheral pulses present. Normal cap refill. No edema. Warm to touch.    RESPIRATORY:Normal rate and effort, breath sounds clear bilaterally throughout chest. Respirations are equal and unlabored no obvious signs of distress.  GASTRO: soft, bowel sounds normal, no tenderness, no abdominal distention.  MUSC: Full ROM. No bony tenderness or soft tissue tenderness. No obvious deformity.  SKIN: Skin is warm and dry, normal skin turgor, mucous membranes moist.  NEURO: 5/5 strength major flexors/extensors bilaterally. Sensory intact to light touch bilaterally. Craig coma scale: eyes open spontaneously-4, oriented & converses-5, obeys commands-6. No neurological abnormalities.   MENTAL STATUS: awake, alert and aware of environment.  EYE: PERRL, both eyes: pupils brisk and reactive to light. Normal size.  ENT: EARS: no obvious drainage. NOSE: no active bleeding.

## 2017-11-28 NOTE — ED PROVIDER NOTES
Encounter Date: 11/28/2017       History     Chief Complaint   Patient presents with    Weakness     Pt with hx of brain tumor, onset today with weakness which worsened after taking bath, also reports severe frontal headache.      27-year-old female with a history of astrocytoma presents the emergency department complaining of headache, diffuse body pain, and weakness.  She reports the weakness is more like lightheadedness and is exacerbated with standing particularly after that he took earlier today.  She reports onset of these symptoms yesterday, somewhat worse today.  She describes a generalized aching pain to her whole body that has neither exacerbating or alleviating nor eliciting factors.  She also reports the affirmation weakness and a frontal, throbbing headache that began mild and has become moderate in intensity.  No relief with over-the-counter medications.  No exacerbating or otherwise mentioned alleviating factors.  No other symptoms reported.  Denies any focal numbness or weakness, vision changes, dizziness, sore throat, chest pain, shortness of breath, abdominal pain, constipation, diarrhea, cough, congestion, dysuria, hematuria, fever, chills, nausea, vomiting.          Review of patient's allergies indicates:   Allergen Reactions    Penicillins      Past Medical History:   Diagnosis Date    Fibromatosis      Past Surgical History:   Procedure Laterality Date    leg biopsy       Family History   Problem Relation Age of Onset    Mental illness Sister      bipolar    Learning disabilities Son      ADHD     Social History   Substance Use Topics    Smoking status: Never Smoker    Smokeless tobacco: Never Used    Alcohol use No     Review of Systems   Constitutional: Positive for fatigue. Negative for chills and fever.   HENT: Negative for congestion, sore throat and voice change.    Eyes: Negative for photophobia, pain and redness.   Respiratory: Negative for cough, choking and shortness of  breath.    Cardiovascular: Negative for chest pain, palpitations and leg swelling.   Gastrointestinal: Negative for abdominal pain, diarrhea and vomiting.   Genitourinary: Negative for dysuria, frequency and urgency.   Musculoskeletal: Negative for back pain, neck pain and neck stiffness.   Neurological: Positive for light-headedness and headaches. Negative for seizures, speech difficulty and numbness.   All other systems reviewed and are negative.      Physical Exam     Initial Vitals [11/28/17 1210]   BP Pulse Resp Temp SpO2   (!) 161/76 95 18 98.5 °F (36.9 °C) 100 %      MAP       104.33         Physical Exam    Nursing note and vitals reviewed.  Constitutional: She appears well-developed and well-nourished. She appears distressed (Mild discomfort; Anxious appearing).   HENT:   Head: Normocephalic and atraumatic.   Oropharynx clear; Dry MM   Eyes: Conjunctivae and EOM are normal. Pupils are equal, round, and reactive to light.   Neck: Normal range of motion. Neck supple. No tracheal deviation present.   Cardiovascular: Normal rate, regular rhythm, normal heart sounds and intact distal pulses.   Pulmonary/Chest: Breath sounds normal. No respiratory distress. She has no wheezes. She has no rhonchi. She has no rales.   Abdominal: Soft. Bowel sounds are normal. She exhibits no distension. There is no tenderness. There is no rebound and no guarding.   Musculoskeletal: Normal range of motion. She exhibits no edema or tenderness.   Neurological: She is alert and oriented to person, place, and time. She has normal strength. No cranial nerve deficit or sensory deficit.   Skin: Skin is warm and dry. Capillary refill takes less than 2 seconds.         ED Course   Procedures  Labs Reviewed   CBC W/ AUTO DIFFERENTIAL - Abnormal; Notable for the following:        Result Value    Hemoglobin 11.7 (*)     Hematocrit 36.8 (*)     MCHC 31.8 (*)     RDW 15.0 (*)     All other components within normal limits   COMPREHENSIVE METABOLIC  PANEL - Abnormal; Notable for the following:     Glucose 114 (*)     Albumin 3.0 (*)     ALT 8 (*)     All other components within normal limits   URINALYSIS - Abnormal; Notable for the following:     Occult Blood UA Trace (*)     All other components within normal limits   CK   POCT URINE PREGNANCY   POCT GLUCOSE   POCT GLUCOSE MONITORING CONTINUOUS     EKG Readings: (Independently Interpreted)   Initial Reading: No STEMI. Rhythm: Normal Sinus Rhythm. Heart Rate: 91. Ectopy: No Ectopy. Conduction: Normal. ST Segments: Normal ST Segments. T Waves: Normal. Axis: Right Axis Deviation.     ECG Results          EKG 12-lead (Final result)  Result time 11/28/17 14:32:46    Final result by Interface, Lab In [facility] (11/28/17 14:32:46)                 Narrative:    Test Reason : r42  Blood Pressure : / mmHG  Vent. Rate : 091 BPM     Atrial Rate : 091 BPM     P-R Int : 130 ms          QRS Dur : 066 ms      QT Int : 346 ms       P-R-T Axes : 024 103 012 degrees     QTc Int : 425 ms    Normal sinus rhythm  Rightward axis  Nonspecific ST abnormality  Abnormal ECG  No previous ECGs available  Confirmed by Francisco SIMS, Madeline ([id number]) on 11/28/2017 2:32:36 PM    Referred By: [email or web address]   SELF           Confirmed By:Madeline Singh MD                            X-Rays:   Independently Interpreted Readings:   Head CT: CT Head interpreted by radiologist and visualized by me:      Imaging Results          CT Head Without Contrast (Final result)  Result time 11/28/17 14:41:44    Final result by Althea Montez MD (11/28/17 14:41:44)                 Impression:     No evidence acute intracranial abnormality.      Electronically signed by: ALTHEA MONTEZ MD  Date:     11/28/17  Time:    14:41              Narrative:    CT head without contrast    History: Headache    Comparison: None    Technique: Contiguous axial images were acquired from vertex to skull base without contrast.    Findings: There is no evidence acute intracranial abnormality.   Specifically there is no evidence acute infarct, contusion, extra-axial fluid collection or midline shift.  The ventricles are normal in size and configuration.  The calvarium is intact.  The paranasal sinuses and mastoid air cells are clear.                              Medical Decision Making:   Initial Assessment:   27-year-old female with history of astrocytoma presents the emergency department complaining of myalgias, generalized weakness, lightheadedness, headache  Differential Diagnosis:   ICH, SAH, migraine, intracranial mass, dehydration, viral syndrome, urinary tract infection, electrolyte dyscrasia, arrhythmia  Independently Interpreted Test(s):   I have ordered and independently interpreted X-rays - see prior notes.  I have ordered and independently interpreted EKG Reading(s) - see prior notes  Clinical Tests:   Lab Tests: Reviewed       <> Summary of Lab: Benign  ED Management:  Patient given IV fluid and Toradol and Compazine.  She reports resolution of her headache and significant improvement in all of her other symptoms.  I informed she and her mother the results as well as plan to discharge with follow-up with primary care physician, as well as neurologist.  Given prescriptions for Zofran and Fioricet, instructed to increase oral hydration with Gatorade G2 or Powerade light, return with new or worsening symptoms.  Patient family is good understanding and are comfortable with discharge at this time.                   ED Course      Clinical Impression:   The primary encounter diagnosis was Dehydration. A diagnosis of Acute nonintractable headache, unspecified headache type was also pertinent to this visit.    Disposition:   Disposition: Discharged  Condition: Stable                        Bacilio Baez MD  11/28/17 [id number]

## 2017-12-04 ENCOUNTER — OFFICE VISIT (OUTPATIENT)
Dept: FAMILY MEDICINE | Facility: HOSPITAL | Age: 27
End: 2017-12-04
Attending: FAMILY MEDICINE
Payer: MEDICAID

## 2017-12-04 VITALS
SYSTOLIC BLOOD PRESSURE: 129 MMHG | BODY MASS INDEX: 28.43 KG/M2 | WEIGHT: 144.81 LBS | HEIGHT: 60 IN | HEART RATE: 96 BPM | DIASTOLIC BLOOD PRESSURE: 84 MMHG

## 2017-12-04 DIAGNOSIS — R68.89 ILL FEELING: Primary | ICD-10-CM

## 2017-12-04 PROCEDURE — 99213 OFFICE O/P EST LOW 20 MIN: CPT | Performed by: FAMILY MEDICINE

## 2017-12-04 RX ORDER — GUAIFENESIN 600 MG/1
1200 TABLET, EXTENDED RELEASE ORAL 2 TIMES DAILY
Qty: 40 TABLET | Refills: 0 | Status: SHIPPED | OUTPATIENT
Start: 2017-12-04 | End: 2017-12-14

## 2017-12-04 RX ORDER — FOLIC ACID 1 MG/1
1 TABLET ORAL DAILY
Qty: 30 TABLET | Refills: 11 | Status: SHIPPED | OUTPATIENT
Start: 2017-12-04 | End: 2019-03-07

## 2017-12-04 RX ORDER — LORATADINE 10 MG/1
10 TABLET ORAL DAILY
Qty: 30 TABLET | Refills: 11 | Status: SHIPPED | OUTPATIENT
Start: 2017-12-04 | End: 2019-03-07

## 2017-12-04 RX ORDER — ACETAMINOPHEN 325 MG/1
325 TABLET ORAL EVERY 6 HOURS PRN
Qty: 40 TABLET | Refills: 0 | Status: SHIPPED | OUTPATIENT
Start: 2017-12-04 | End: 2017-12-14

## 2017-12-04 NOTE — PROGRESS NOTES
Subjective:       Patient ID: Kadie Mccann is a 27 y.o. female.    Chief Complaint: MELLISAI    Pt presents for 3 days of feelnig ill. The patient states she has cough productive of green sputum, rhinorrhea, chills, no fever, and headaches. She states she feels like she has the flu though she got the flu shot last month. Otherwise the patient has been doing well.      Review of Systems   Constitutional: Positive for chills. Negative for fever.   HENT: Positive for rhinorrhea and sore throat.    Eyes: Negative for visual disturbance.   Respiratory: Negative for shortness of breath.    Cardiovascular: Negative for chest pain.   Gastrointestinal: Negative for abdominal pain.   Endocrine: Negative for polyuria.   Genitourinary: Negative for dysuria.   Musculoskeletal: Negative for back pain.   Skin: Negative for color change.   Neurological: Positive for headaches.   Psychiatric/Behavioral: Negative for agitation and confusion.       Objective:      Vitals:    12/04/17 1339   BP: 129/84   Pulse: 96     Physical Exam   Constitutional: She is oriented to person, place, and time. She appears well-developed and well-nourished.   HENT:   Head: Normocephalic and atraumatic.   Nose: Rhinorrhea present.   Mouth/Throat: Posterior oropharyngeal erythema present.   Eyes: EOM are normal. Pupils are equal, round, and reactive to light.   Neck: Normal range of motion. Neck supple.   Cardiovascular: Normal rate, regular rhythm, normal heart sounds and intact distal pulses.    Pulmonary/Chest: Effort normal and breath sounds normal.   Abdominal: Soft. She exhibits no distension.   Musculoskeletal: Normal range of motion.   Neurological: She is alert and oriented to person, place, and time.   Skin: Skin is warm and dry.   Psychiatric: She has a normal mood and affect. Her behavior is normal. Judgment and thought content normal.   Nursing note and vitals reviewed.      Assessment:       1. Ill feeling        Plan:       Ill feeling  -      POCT Influenza A/B  - rapid flu negative, spoke patient about symptomatic treatment and provided prescription fo rmucinex, loratadine, and tylenol and instructed patient to continue drinking plenty of fluids    No Follow-up on file.

## 2017-12-06 ENCOUNTER — TELEPHONE (OUTPATIENT)
Dept: FAMILY MEDICINE | Facility: HOSPITAL | Age: 27
End: 2017-12-06

## 2017-12-06 RX ORDER — FLUTICASONE PROPIONATE 50 MCG
SPRAY, SUSPENSION (ML) NASAL
Refills: 0 | COMMUNITY
Start: 2017-11-07 | End: 2019-03-07

## 2017-12-06 NOTE — PROGRESS NOTES
I assume primary medical responsibility for this patient, I have reviewed the case history, findings, diagnosis and treatment plan with the resident and agree that the care is reasonable and necessary. This service has been performed by a resident without the presence of a teaching physician under the primary care exception  Jia Triplett  12/6/2017

## 2017-12-06 NOTE — TELEPHONE ENCOUNTER
----- Message from Judit Florence MA sent at 12/6/2017 12:52 PM CST -----  Contact: IVORY; [facility]  [phone]  Please call [facility] regarding flonase rx.  Pharmacy said it was cancelled; I do not see it in the medication list.  Thanks.

## 2017-12-07 ENCOUNTER — CLINICAL SUPPORT (OUTPATIENT)
Dept: REHABILITATION | Facility: HOSPITAL | Age: 27
End: 2017-12-07
Attending: FAMILY MEDICINE
Payer: MEDICAID

## 2017-12-07 DIAGNOSIS — R49.0 DYSPHONIA: ICD-10-CM

## 2017-12-07 DIAGNOSIS — R47.1 DYSARTHRIA: ICD-10-CM

## 2017-12-07 DIAGNOSIS — R41.89 COGNITIVE DEFICITS: ICD-10-CM

## 2017-12-07 PROCEDURE — 97532 *HC SP COG SKL DEV EA 15 MIN: CPT | Mod: PN

## 2017-12-07 NOTE — PROGRESS NOTES
OUTPATIENT REHABILITATION  SPEECH THERAPY PROGRESS NOTE    Date:  12/07/2017     Start Time:  11:00  Stop Time:  12:00    Subjective/History  Onset Date:  NF type I, Is congenitial  Primary Diagnosis:  Astrocytoma  Treatment Diagnosis:  Dysphonia, dysarthria, dysphagia  Referring Provider:  Dr. Fiordaliza Marquez  Orders: ST for evaluation and treat  Current Medical History:  Kadie Mccann presents to the [facility] secondary to the diagnosis of astrocytoma.  Pt has reportedly undergone chemotherapy.  Size of tumor has reportedly decreased.     Past Medical History:   Past Medical History:   Diagnosis Date    Fibromatosis        TIMED  Procedure Min.   Cognitive tx 0 minutes                 UNTIMED  Procedure Min.   Swallow tx  0 minutes   Speech/language tx   minutes     Total Minutes: 60  Total Timed Units: 0  Total Untimed Units: 1  Charges Billed/# of units: 1    Visit #: 11  Date of Evaluation: 10/5/17    Progress/Current Status     Subjective:     Pain: 0 /10  Pt pleasant. No pain reported. Pt has been sick with a virus; therefore, she missed a few sessions. Pt is feeling better today.   Today Ms. Mccann spent half of the session working on [employer].  Took two minutes to set up and 15-20 minutes to complete Fit Test for baseline scores. ST helped pt set up reminder in patient's phone to alert her every day to complete home program brain training through [employer].     Objective:     Short Term Goals   1. Pt will participate in a clinical swallow evaluation to assess swallow function. GOAL MET    2. Pt will answer complex yes/no questions with 80% acc given min A to address auditory comprehension. GOAL MET    3. Pt will sequence 3-4 steps with 80% acc given min A to improve cognition. GOAL MET  -sequencing 3 step scenarios -GOAL MET 11/14   -sequencing 4 step scenarios -GOAL MET 11/14  -written sequencing 4 steps-100% acc (4/4) independently   -written  sequencing 5 steps-25% acc (1/4) given min A; decreased accuracy    4. Pt will spell 4-6 letter words with 80% acc given min A to improve language skills.   -written words - simple common vocabulary words  -4 letter words - Not addressed this session.   -5 letter words - -Not addressed this session.   -6 letter words - -Not addressed this session.      5. Pt will participate in vocal function exercises (sustained phonation, pitch glides, etc) x 5 each with min A to improve vocal quality.   -pitch glides /a/ and /i/  -Not addressed this session.   -sustained /a/ --Not addressed this session.   -sustained /i/ -Not addressed this session.   -sustained /m/ -Not addressed this session.   -sustained /u/ -Not addressed this session.    -resonant therapy exercises /m/, /b/, /p/ -Not addressed this session.     6. Pt will recall/utilize memory strategies (WRAP) with min A to assist in delayed recall.   -reviewed    7. Pt will participate in delayed recall of 3 words given min A utilizing memory strategies.   -OMG (olive oil, milk, garlic) recalled acronym and words with 100% acc independently   -BAM (banana, apple, marbin) recalled words with 100% acc independently     8. Pt will name 15 abstract category members in 60 seconds with 80% acc given min A to improve word finding. GOAL MET  -Not addressed this session.     9. Pt will participate in mental manipulation tasks with 80% acc given min A to address attention.   -Not addressed this session.     10. Pt will participate in articulation hierarchy exercises with 80% acc given min A to improve dysarthria.   -Not addressed this session.     11. Pt will named uncommon objects with 90% acc given min A to improve word finding skills.   -Not addressed this session.     12. Pt will read 3-5 word sentences with 85% acc given min A. GOAL MET     13. Pt will participate in organization and attention tasks with 80% acc given min A.  -Not addressed this session.     14. Pt will  "participate in immediate/recent recall given 3-5 words given min A with 80% acc.  -Recall of three words: Not addressed this session.     15. Pt will list words that rhyme with 80% acc given min A to improve word finding.   -Given a visual picture and written word, pt listed rhyming words with: Not addressed this session.     16. Pt will spell 4-7 word sentences with 80% acc given min A.  GOAL MET x 1 session  -4 word sentences: GOAL MET  -7 word sentences: -Not addressed this session.     17. Pt will participate in games in order to address attention, word-finding, and logic with 80% acc independently   -Playing Taboo -Not addressed this session.     Long Term Goals   1. Pt will participate in vocal function exercises to improve overall quality of voice with min A.   2. Pt will participate in delayed recall, memory strategies, sequencing, attention tasks to improve overall cognition with min A.     Assessment:     Mild dysarthria c/b slow rate of speech and imprecise articulation. Moderate dysphonia noted c/b decreased breath support, pitch variability, tremor, pitch breaks during sustained phonation and pitch glides. Speech intelligibility %. Mild dysphagia based on pt's report of coughing on solids and thin liquids occasionaly; will assess further. Mild cognitive-linguistic deficits c/b impairments in attention, language, spelling, and delayed recall. Mild expressive and receptive aphasia c/b difficulty with complex yes/no questions and occasional word-finding deficits. Possible R side hearing loss c/b "muffled sound" and difficulty hearing per pt report. Patient will benefit from outpatient neurological rehabilitation speech therapy.    Patient Education/Response:     Educated pt on goals and plan of care. Pt reported understanding. Home program: [employer] and 5 step sequencing worksheet.     Plans and Goals:     Continue POC.   Updated POC today.    Christel Aguayo M.S.CCC-SLP  Speech Language Pathologist "

## 2017-12-07 NOTE — PLAN OF CARE
Date: 12/07/2017    SPEECH THERAPY UPDATED PLAN OF TREATMENT    Patient name: Kadie Mccann  Onset Date:  NF type I, is congenital   SOC Date:  10/5/17  Primary Diagnosis:  Astrocytoma   1. Cognitive deficits     2. Dysarthria     3. Dysphonia       Treatment Diagnosis:  Dysphonia, dysarthria, dysphagia  Certification Period:  September 20, 2017 to December 31, 2017  Plan of Care Certification Period: 12/5/17 to 1/5/17  Precautions: aspiration  Visits from SOC:  11    Updated Assessment:    Auditory Comprehension:   Simple yes/no questions: 100% acc   Complex yes/no questions: impaired mod A  Following simple 1-2 step directions: WFL     Reading Comprehension: Pt was able to read and follow simple directions.     Verbal Expression:  Naming: WNL  Automatic speech acts: WNL  Repeating multi-syllabic words: WNL  Repeating 5-8 word sentences: WNL     Written Expression: Handwriting is legible. Mild difficulty with spelling noted.     Cognition:   Behavior: Alert, appropriate, motivated  Orientation: 100%  Attention: Appropriate in conversation. Mild deficit noted in formal assessment.   Memory: Immediate recall WFL. Delayed recall impaired.      Motor Speech/Fluency/Voice: Speech fluency WFL. Mild dysarthria c/b slow rate of speech and imprecise articulation. Moderate dysphonia noted c/b decreased breath support, pitch variability, tremor, pitch breaks during sustained phonation and pitch glides. Speech intelligibility %.  Sustained phonation /a/ for 13 seconds  -sustained /i/ -28 seconds  -sustained /m/ -28 seconds  -sustained /u/ for 23 seconds  Improvement noted in sustained phonation. Within normal limits.      Swallowing: Pt reported coughing on food and thin liquids occasionally. She stated decline in swallow function post-biopsy; however, currently minimal difficulty noted. ST will follow up with a clinical swallow evaluation.    -difficulty with chicken, meat, beef.  Have to chew it a lot.    -not  "much trouble with crumbly foods; needs to concentrate on eating it slow.   -Clinical swallow evaluation completed on 10/13/17. No overt signs/symptoms of aspiration. No anterior spillage. Adequate A-P transport. Adequate bolus control.  No coughing, throat clearing, or changes in vocal quality noted today during thin liquids, puree, soft solid, and solid trials. No pocketing noted. No oral residue noted.       Hearing: Pt reported "muffled sounds" on R side post-biopsy.  Pt is scheduled to meet with an audiologist January 2, 2018.    Previous Short Term Goals Status:     1. Pt will participate in a clinical swallow evaluation to assess swallow function. GOAL MET  2. Pt will answer complex yes/no questions with 80% acc given min A to address auditory comprehension. GOAL MET  3. Pt will sequence 3-4 steps with 80% acc given min A to improve cognition. GOAL MET  4. Pt will spell 4-6 letter words with 80% acc given min A to improve language skills. progressing   5. Pt will participate in vocal function exercises (sustained phonation, pitch glides, etc) x 5 each with min A to improve vocal quality. progressing   6. Pt will recall/utilize memory strategies (WRAP) with min A to assist in delayed recall.progressing  7. Pt will participate in delayed recall of 3 words given min A utilizing memory strategies. progressing  8. Pt will name 15 abstract category members in 60 seconds with 80% acc given min A to improve word finding. GOAL MET  9. Pt will participate in mental manipulation tasks with 80% acc given min A to address attention. -progressing  10. Pt will participate in articulation hierarchy exercises with 80% acc given min A to improve dysarthria. progressing    New Short Term Goals Status:     11. Pt will named uncommon objects with 90% acc given min A to improve word finding skills. progressing  12. Pt will read 3-5 word sentences with 85% acc given min A. GOAL MET   13. Pt will participate in organization and " "attention tasks with 80% acc given min A.progressing  14. Pt will participate in immediate/recent recall given 3-5 words given min A with 80% acc.progressing   15. Pt will list words that rhyme with 80% acc given min A to improve word finding. progressing   16. Pt will spell 4-7 word sentences with 80% acc given min A.  GOAL MET x 1 session   17. Pt will participate in games in order to address attention, word-finding, and logic with 80% acc independently. progressing    Long Term Goal Status:   continue per initial plan of care.  Reasons for Recertification of Therapy:   Pt met 6 goals. Pt is progressing towards all other goals. New goals established. Pt continues with mild dysarthria c/b slow rate of speech and imprecise articulation. Moderate dysphonia noted c/b decreased breath support, pitch variability, tremor, pitch breaks during sustained phonation and pitch glides. Speech intelligibility %. Mild dysphagia based on pt's report of coughing on solids and thin liquids occasionaly. Mild cognitive-linguistic deficits c/b impairments in attention, language, spelling, and delayed recall. Mild expressive and receptive aphasia c/b difficulty with complex yes/no questions and occasional word-finding deficits. Possible R side hearing loss c/b "muffled sound" and difficulty hearing per pt report. Patient will benefit from outpatient neurological rehabilitation speech therapy.      Certification Period: September 20, 2017 to December 31, 2017  Recommended Treatment Plan: 2 times per week for 4 weeks: Patient Education, Self Care, Therapeutic Activites and Therapeutic Exercise  Other Recommendations: None at this time.     Therapist's Name:   Christel Aguayo M.S.CCC-SLP  Speech Language Pathologist    Date: 12/07/2017    I CERTIFY THE NEED FOR THESE SERVICES FURNISHED UNDER THIS PLAN OF TREATMENT AND WHILE UNDER MY CARE    Physician's comments: " ________________________________________________________________________________________________________________________________________________      Physician's Name: ___________________________________

## 2017-12-14 ENCOUNTER — DOCUMENTATION ONLY (OUTPATIENT)
Dept: REHABILITATION | Facility: HOSPITAL | Age: 27
End: 2017-12-14

## 2017-12-21 ENCOUNTER — CLINICAL SUPPORT (OUTPATIENT)
Dept: REHABILITATION | Facility: HOSPITAL | Age: 27
End: 2017-12-21
Attending: FAMILY MEDICINE
Payer: MEDICAID

## 2017-12-21 DIAGNOSIS — R41.89 COGNITIVE DEFICITS: ICD-10-CM

## 2017-12-21 DIAGNOSIS — R49.0 DYSPHONIA: ICD-10-CM

## 2017-12-21 DIAGNOSIS — R47.1 DYSARTHRIA: ICD-10-CM

## 2017-12-21 PROCEDURE — 97532 *HC SP COG SKL DEV EA 15 MIN: CPT | Mod: PN

## 2017-12-21 NOTE — PROGRESS NOTES
OUTPATIENT REHABILITATION  SPEECH THERAPY PROGRESS NOTE    Date:  12/21/2017     Start Time:  11:00  Stop Time:  11:45    Subjective/History  Onset Date:  NF type I, Is congenitial  Primary Diagnosis:  Astrocytoma  Treatment Diagnosis:  Dysphonia, dysarthria, dysphagia  Referring Provider:  Dr. Fiordaliza Marquez  Orders: ST for evaluation and treat  Current Medical History:  Kadie Mccann presents to the [facility] secondary to the diagnosis of astrocytoma.  Pt has reportedly undergone chemotherapy.  Size of tumor has reportedly decreased.     Past Medical History:   Past Medical History:   Diagnosis Date    Fibromatosis        TIMED  Procedure Min.   Cognitive tx 0 minutes                 UNTIMED  Procedure Min.   Swallow tx  0 minutes   Speech/language tx   minutes     Total Minutes: 60  Total Timed Units: 0  Total Untimed Units: 1  Charges Billed/# of units: 1    Visit #: 12  Date of Evaluation: 10/5/17    Progress/Current Status     Subjective:     Pain: 0 /10  Pt pleasant. No pain reported. Pt has been sick with a virus; therefore, she missed a few sessions. Pt is feeling better today.   Today Ms. Mccann spent half of the session working on [employer].  Took two minutes to set up and 15-20 minutes to complete Fit Test for baseline scores. ST helped pt set up reminder in patient's phone to alert her every day to complete home program brain training through [employer].     Objective:     Short Term Goals   1. Pt will participate in a clinical swallow evaluation to assess swallow function. GOAL MET  2. Pt will answer complex yes/no questions with 80% acc given min A to address auditory comprehension. GOAL MET    3. Pt will sequence 3-4 steps with 80% acc given min A to improve cognition. GOAL MET  -sequencing 3 step scenarios -GOAL MET 11/14   -sequencing 4 step scenarios -GOAL MET 11/14  -written sequencing 4 steps- GOAL MET   -written sequencing 5 steps- 60% acc  Independently; 80% acc min A    4. Pt will spell 4-6 letter words with 80% acc given min A to improve language skills.   -written words - simple common vocabulary words  -4 letter words - 80% acc Independently; 100% acc with 1 self correction  -5 letter words - 60% acc Independently; 80% acc min A  -6 letter words - 80% acc Independently; 100% acc given min A    5. Pt will participate in vocal function exercises (sustained phonation, pitch glides, etc) x 5 each with min A to improve vocal quality.   -pitch glides /a/ and /i/  -Not addressed this session.   -sustained /a/ --Not addressed this session.   -sustained /i/ -Not addressed this session.   -sustained /m/ -Not addressed this session.   -sustained /u/ -Not addressed this session.    -resonant therapy exercises /m/, /b/, /p/ -Not addressed this session.     6. Pt will recall/utilize memory strategies (WRAP) with min A to assist in delayed recall.   -reviewed    7. Pt will participate in delayed recall of 3 words given min A utilizing memory strategies.   -Not addressed this session.     8. Pt will name 15 abstract category members in 60 seconds with 80% acc given min A to improve word finding. GOAL MET    9. Pt will participate in mental manipulation tasks with 80% acc given min A to address attention.   -Not addressed this session.     10. Pt will participate in articulation hierarchy exercises with 80% acc given min A to improve dysarthria.   -Not addressed this session.     11. Pt will named uncommon objects with 90% acc given min A to improve word finding skills.   -Not addressed this session.     12. Pt will read 3-5 word sentences with 85% acc given min A. GOAL MET     13. Pt will participate in organization and attention tasks with 80% acc given min A.  -attention worksheet with min A     14. Pt will participate in immediate/recent recall given 3-5 words given min A with 80% acc.  -Recall of three words: Not addressed this session.     15. Pt will list  "words that rhyme with 80% acc given min A to improve word finding.   -Given a visual picture and written word, pt listed rhyming words with: Not addressed this session.     16. Pt will spell 4-7 word sentences with 80% acc given min A.  GOAL MET x 1 session  -4 word sentences: GOAL MET  -7 word sentences: -Not addressed this session.     17. Pt will participate in games in order to address attention, word-finding, and logic with 80% acc independently   -Playing Taboo -Not addressed this session.     Long Term Goals   1. Pt will participate in vocal function exercises to improve overall quality of voice with min A.   2. Pt will participate in delayed recall, memory strategies, sequencing, attention tasks to improve overall cognition with min A.     Assessment:     Mild dysarthria c/b slow rate of speech and imprecise articulation. Moderate dysphonia noted c/b decreased breath support, pitch variability, tremor, pitch breaks during sustained phonation and pitch glides. Speech intelligibility %. Mild dysphagia based on pt's report of coughing on solids and thin liquids occasionaly; will assess further. Mild cognitive-linguistic deficits c/b impairments in attention, language, spelling, and delayed recall. Mild expressive and receptive aphasia c/b difficulty with complex yes/no questions and occasional word-finding deficits. Possible R side hearing loss c/b "muffled sound" and difficulty hearing per pt report. Patient will benefit from outpatient neurological rehabilitation speech therapy.    Patient Education/Response:     Educated pt on goals and plan of care. Pt reported understanding. Home program: [facility] and 5 step sequencing worksheet.     Plans and Goals:     Continue POC.   Updated POC due 2/7/18.     Christel Aguayo M.S.CCC-SLP  Speech Language Pathologist         "

## 2017-12-26 ENCOUNTER — TELEPHONE (OUTPATIENT)
Dept: REHABILITATION | Facility: HOSPITAL | Age: 27
End: 2017-12-26

## 2017-12-28 ENCOUNTER — CLINICAL SUPPORT (OUTPATIENT)
Dept: REHABILITATION | Facility: HOSPITAL | Age: 27
End: 2017-12-28
Attending: FAMILY MEDICINE
Payer: MEDICAID

## 2017-12-28 DIAGNOSIS — R41.89 COGNITIVE DEFICITS: ICD-10-CM

## 2017-12-28 DIAGNOSIS — R47.1 DYSARTHRIA: ICD-10-CM

## 2017-12-28 DIAGNOSIS — R49.0 DYSPHONIA: ICD-10-CM

## 2017-12-28 PROCEDURE — 97532 *HC SP COG SKL DEV EA 15 MIN: CPT | Mod: PN

## 2017-12-28 NOTE — PROGRESS NOTES
OUTPATIENT REHABILITATION  SPEECH THERAPY PROGRESS NOTE    Date:  12/28/2017     Start Time:  11:00  Stop Time:  12:00    Subjective/History  Onset Date:  NF type I, Is congenitial  Primary Diagnosis:  Astrocytoma  Treatment Diagnosis:  Dysphonia, dysarthria, dysphagia  Referring Provider:  Dr. Fiordaliza Marquez  Orders: ST for evaluation and treat  Current Medical History:  Kadie Mccann presents to the [facility] secondary to the diagnosis of astrocytoma.  Pt has reportedly undergone chemotherapy.  Size of tumor has reportedly decreased.     Past Medical History:   Past Medical History:   Diagnosis Date    Fibromatosis        TIMED  Procedure Min.   Cognitive tx 60 minutes                 UNTIMED  Procedure Min.   Swallow tx  0 minutes   Speech/language tx  0 minutes     Total Minutes: 60  Total Timed Units: 3  Total Untimed Units: 0  Charges Billed/# of units: 3    Visit #: 13  Date of Evaluation: 10/5/17    Progress/Current Status     Subjective:     Pain: 0 /10  Pt pleasant. No pain reported.     Objective:     Short Term Goals   1. Pt will participate in a clinical swallow evaluation to assess swallow function. GOAL MET  2. Pt will answer complex yes/no questions with 80% acc given min A to address auditory comprehension. GOAL MET    3. Pt will sequence 3-4 steps with 80% acc given min A to improve cognition. GOAL MET    4. Pt will spell 4-6 letter words with 80% acc given min A to improve language skills.   -written words - simple common vocabulary words  -4 letter words - 100% acc Independently   -5 letter words - 80% acc Independently; 100% acc min A  -6 letter words - 30% acc Independently; 50% acc min A    5. Pt will participate in vocal function exercises (sustained phonation, pitch glides, etc) x 5 each with min A to improve vocal quality.   -pitch glides /a/ and /i/  X 5 each  -sustained /a/ --Not addressed this session.   -sustained /i/ -Not addressed  this session.   -sustained /m/ -Not addressed this session.   -sustained /u/ -Not addressed this session.    -resonant therapy exercises /m/, /b/, /p/ -Not addressed this session.     6. Pt will recall/utilize memory strategies (WRAP) with min A to assist in delayed recall.   -reviewed    7. Pt will participate in delayed recall of 3 words given min A utilizing memory strategies.   -Not addressed this session.     8. Pt will name 15 abstract category members in 60 seconds with 80% acc given min A to improve word finding. GOAL MET    9. Pt will participate in mental manipulation tasks with 80% acc given min A to address attention.   -Not addressed this session.     10. Pt will participate in articulation hierarchy exercises with 80% acc given min A to improve dysarthria.   -Not addressed this session.     11. Pt will named uncommon objects with 90% acc given min A to improve word finding skills.   -Not addressed this session.     12. Pt will read 3-5 word sentences with 85% acc given min A. GOAL MET     13. Pt will participate in organization and attention tasks with 80% acc given min A.  -attention worksheet with min A     14. Pt will participate in immediate/recent recall given 3-5 words given min A with 80% acc.  -Recall of three related words: 100% acc (5/5) Independently  -recall of 4 related words: 80% acc (4/5) Independently  Recall of 4 unrelated words: 20% acc (1/5) Independently     15. Pt will list words that rhyme with 80% acc given min A to improve word finding.   -List words that rhyme with 86% acc (12/14)  -matching rhyming words with 100% acc    16. Pt will spell 4-7 word sentences with 80% acc given min A.  GOAL MET   -7 word sentences: 100% acc     17. Pt will participate in games in order to address attention, word-finding, and logic with 80% acc independently   -Playing Taboo -Not addressed this session.     Long Term Goals   1. Pt will participate in vocal function exercises to improve overall  "quality of voice with min A.   2. Pt will participate in delayed recall, memory strategies, sequencing, attention tasks to improve overall cognition with min A.     Assessment:     Mild dysarthria c/b slow rate of speech and imprecise articulation. Moderate dysphonia noted c/b decreased breath support, pitch variability, tremor, pitch breaks during sustained phonation and pitch glides. Speech intelligibility %. Mild dysphagia based on pt's report of coughing on solids and thin liquids occasionaly; will assess further. Mild cognitive-linguistic deficits c/b impairments in attention, language, spelling, and delayed recall. Mild expressive and receptive aphasia c/b difficulty with complex yes/no questions and occasional word-finding deficits. Possible R side hearing loss c/b "muffled sound" and difficulty hearing per pt report. Patient will benefit from outpatient neurological rehabilitation speech therapy.    Patient Education/Response:     Educated pt on goals and plan of care. Pt reported understanding. Home program: [facility], sequencing 4 step, calendar activity, organizing planner activity.     Plans and Goals:     Continue POC.   Updated POC due 2/7/18.     Christel Aguayo M.S.CCC-SLP  Speech Language Pathologist         "

## 2018-01-04 ENCOUNTER — TELEPHONE (OUTPATIENT)
Dept: REHABILITATION | Facility: HOSPITAL | Age: 28
End: 2018-01-04

## 2018-01-04 ENCOUNTER — CLINICAL SUPPORT (OUTPATIENT)
Dept: REHABILITATION | Facility: HOSPITAL | Age: 28
End: 2018-01-04
Attending: FAMILY MEDICINE
Payer: MEDICAID

## 2018-01-04 DIAGNOSIS — R41.89 COGNITIVE DEFICITS: ICD-10-CM

## 2018-01-04 DIAGNOSIS — R49.0 DYSPHONIA: ICD-10-CM

## 2018-01-04 DIAGNOSIS — R47.1 DYSARTHRIA: ICD-10-CM

## 2018-01-04 PROCEDURE — 97127 *HC SP COG SKL DEV EA 15 MIN: CPT | Mod: PN

## 2018-01-04 NOTE — PROGRESS NOTES
OUTPATIENT REHABILITATION  SPEECH THERAPY PROGRESS NOTE    Date:  01/04/2018     Start Time:  10:15  Stop Time:  11:00    Subjective/History  Onset Date:  NF type I, Is congenitial  Primary Diagnosis:  Astrocytoma  Treatment Diagnosis:  Dysphonia, dysarthria, dysphagia  Referring Provider:  Dr. Fiordaliza Marquez  Orders: ST for evaluation and treat  Current Medical History:  Kadie Mccann presents to the [facility] secondary to the diagnosis of astrocytoma.  Pt has reportedly undergone chemotherapy.  Size of tumor has reportedly decreased.     Past Medical History:   Past Medical History:   Diagnosis Date    Fibromatosis        TIMED  Procedure Min.   Cognitive tx 45 minutes                 UNTIMED  Procedure Min.   Swallow tx  0 minutes   Speech/language tx  0 minutes     Total Minutes: 45  Total Timed Units: 3  Total Untimed Units: 0  Charges Billed/# of units: 3    Visit #: 14  Date of Evaluation: 10/5/17    Progress/Current Status     Subjective:     Pain: 0 /10  Pt pleasant. No pain reported.     Objective:     Short Term Goals   1. Pt will participate in a clinical swallow evaluation to assess swallow function. GOAL MET  2. Pt will answer complex yes/no questions with 80% acc given min A to address auditory comprehension. GOAL MET    3. Pt will sequence 3-4 steps with 80% acc given min A to improve cognition. GOAL MET    4. Pt will spell 4-6 letter words with 80% acc given min A to improve language skills.   -written words - simple common vocabulary words  -4 letter words - MET previously  -5 letter words - 60% acc Independently   -6 letter words - 50% acc Independently     5. Pt will participate in vocal function exercises (sustained phonation, pitch glides, etc) x 5 each with min A to improve vocal quality.   -pitch glides /a/ and /i/  X 5 each  -sustained /a/ --Not addressed this session.   -sustained /i/ -Not addressed this session.   -sustained /m/ -Not  addressed this session.   -sustained /u/ -Not addressed this session.    -resonant therapy exercises /m/, /b/, /p/ -Not addressed this session.     6. Pt will recall/utilize memory strategies (WRAP) with min A to assist in delayed recall.   -reviewed    7. Pt will participate in delayed recall of 3 words given min A utilizing memory strategies.   -Not addressed this session.     8. Pt will name 15 abstract category members in 60 seconds with 80% acc given min A to improve word finding. GOAL MET    9. Pt will participate in mental manipulation tasks with 80% acc given min A to address attention.   -mod A  -memory exercise on lumosity    10. Pt will participate in articulation hierarchy exercises with 80% acc given min A to improve dysarthria.   -Not addressed this session.     11. Pt will named uncommon objects with 90% acc given min A to improve word finding skills.   -Not addressed this session.     12. Pt will read 3-5 word sentences with 85% acc given min A. GOAL MET     13. Pt will participate in organization and attention tasks with 80% acc given min A.  -attention worksheet with min A     14. Pt will participate in immediate/recent recall given 3-5 words given min A with 80% acc.  -Recall of three related words: MET previously   -recall of 4 related words: MET previously  Recall of 4 unrelated words: 40% acc    15. Pt will list words that rhyme with 80% acc given min A to improve word finding. GOAL MET  16. Pt will spell 4-7 word sentences with 80% acc given min A.  GOAL MET     17. Pt will participate in games in order to address attention, word-finding, and logic with 80% acc independently   -Playing [employer] min-mod A    Long Term Goals   1. Pt will participate in vocal function exercises to improve overall quality of voice with min A.   2. Pt will participate in delayed recall, memory strategies, sequencing, attention tasks to improve overall cognition with min A.     Assessment:     Mild dysarthria c/b slow  "rate of speech and imprecise articulation. Moderate dysphonia noted c/b decreased breath support, pitch variability, tremor, pitch breaks during sustained phonation and pitch glides. Speech intelligibility %. Mild dysphagia based on pt's report of coughing on solids and thin liquids occasionaly; will assess further. Mild cognitive-linguistic deficits c/b impairments in attention, language, spelling, and delayed recall. Mild expressive and receptive aphasia c/b difficulty with complex yes/no questions and occasional word-finding deficits. Possible R side hearing loss c/b "muffled sound" and difficulty hearing per pt report. Patient will benefit from outpatient neurological rehabilitation speech therapy.    Patient Education/Response:     Educated pt on goals and plan of care. Pt reported understanding. Home program: [facility].     Plans and Goals:     Continue POC.   Updated POC due 2/7/18.     Christel Aguayo M.S.CCC-SLP  Speech Language Pathologist         "

## 2018-01-11 ENCOUNTER — CLINICAL SUPPORT (OUTPATIENT)
Dept: REHABILITATION | Facility: HOSPITAL | Age: 28
End: 2018-01-11
Attending: FAMILY MEDICINE
Payer: MEDICAID

## 2018-01-11 DIAGNOSIS — R49.0 DYSPHONIA: ICD-10-CM

## 2018-01-11 DIAGNOSIS — R47.1 DYSARTHRIA: ICD-10-CM

## 2018-01-11 DIAGNOSIS — R41.89 COGNITIVE DEFICITS: ICD-10-CM

## 2018-01-11 PROCEDURE — 92507 TX SP LANG VOICE COMM INDIV: CPT | Mod: PN

## 2018-01-11 NOTE — PROGRESS NOTES
OUTPATIENT NEUROLOGICAL REHABILITATION  SPEECH THERAPY PROGRESS NOTE    Date:  1/11/2018     Start Time:  1100  Stop Time:  1145    Subjective/History  Onset Date:  NF type I, is congenital  Primary Diagnosis:  Astrocytoma    Treatment Diagnosis:    Encounter Diagnoses   Name Primary?    Cognitive deficits     Dysarthria     Dysphonia       Referring Provider:  Dr. Fiordaliza Marquez  Reason for Referral: Speech therapy for evaluation and treatment  Current Medical History: Kadie Mccann presents to the [facility] secondary to the diagnosis of astrocytoma.  Pt has reportedly undergone chemotherapy.  Size of tumor has reportedly decreased.   Precautions:  fall    TIMED  Procedure Min.   Cog tx          UNTIMED  Procedure Min.   Speech/lang tx 45 min         Total Minutes: 45  Total Timed Units: 0  Total Untimed Units: 1  Charges Billed/# of units: 1    Visit #:  15  Date of Evaluation:  10/5/17  Plan of Care Expiration:    2/7/18    Progress/Current Status  Subjective:     Pain: 0 /10  Pt requested working on language, word finding, and articulation in Italian.  requested today; however, no  present. Continued working on goals in English. Note: weather bad: icy conditions and closed roads.     Objective:     Short Term Goals: (8 weeks) Current Progress:   4. Pt will spell 4-6 letter words with 80% acc given min A to improve language skills.  -4 letter words MET previously  -5 letter word -Not addressed this session.      5. Pt will participate in vocal function exercises (sustained phonation, pitch glides, etc) x 5 each with min A to improve vocal quality.  -pitch glides /a/ and /i/  X 5 each  -sustained /a/ for 23.71 seconds  -sustained /i/  For 29.36 seconds  -sustained /m/ for 23.39 seconds  -sustained /u/ for 27 seconds  -resonant therapy exercises /m/, /b/, /p/ -Not addressed this session.       6. Pt will recall/utilize memory strategies  (WRAP) with min A to assist in delayed recall.    reviewed    7. Pt will participate in delayed recall of 3 words given min A utilizing memory strategies.    -Not addressed this session.    9. Pt will participate in mental manipulation tasks with 80% acc given min A to address attention.  Mod A  Memory exercise on lumosity     10. Pt will participate in articulation hierarchy exercises with 80% acc given min A to improve dysarthria.    -Not addressed this session.     11. Pt will named uncommon objects with 90% acc given min A to improve word finding skills.    -Not addressed this session.    13. Pt will participate in organization and attention tasks with 80% acc given min A.   Attention worksheet with min A      14. Pt will participate in immediate/recent recall given 3-5 words given min A with 80% acc. -Recall of three related words: MET previously   -recall of 4 related words: MET previously  Recall of 4 unrelated words: -Not addressed this session.      17. Pt will participate in games in order to address attention, word-finding, and logic with 80% acc independently  -Playing Taboo min-mod A          GOALS MET:   1. Pt will participate in a clinical swallow evaluation to assess swallow function. GOAL MET  2. Pt will answer complex yes/no questions with 80% acc given min A to address auditory comprehension. GOAL MET  8. Pt will name 15 abstract category members in 60 seconds with 80% acc given min A to improve word finding. GOAL MET  12. Pt will read 3-5 word sentences with 85% acc given min A. GOAL MET   15. Pt will list words that rhyme with 80% acc given min A to improve word finding. GOAL MET  16. Pt will spell 4-7 word sentences with 80% acc given min A.  GOAL MET        Assessment:   Pt is progressing towards goals. Current goals remain appropriate. Goals to be adjusted as necessary.     Mild dysarthria c/b slow rate of speech and imprecise articulation. Moderate dysphonia noted c/b decreased breath  support, pitch variability, tremor, pitch breaks during sustained phonation and pitch glides. Speech intelligibility %. Mild dysphagia based on pt's report of coughing on solids and thin liquids occasionaly; will assess further. Mild cognitive-linguistic deficits c/b impairments in attention, language, spelling, and delayed recall. Mild aphasia c/b occasional word-finding deficits. Patient will benefit from outpatient neurological rehabilitation speech therapy. Prognosis for improvement remains good.       Patient Education/Response:   Educated pt on goals and plan of care. Pt reported understanding.      Home Program: [facility]    Plans and Goals:     Continue POC with focus on cognition.   Updated POC due 2/7/18.       Christel Aguayo M.S.CCC-SLP  Speech Language Pathologist   1/11/2018

## 2018-01-18 ENCOUNTER — CLINICAL SUPPORT (OUTPATIENT)
Dept: REHABILITATION | Facility: HOSPITAL | Age: 28
End: 2018-01-18
Attending: FAMILY MEDICINE
Payer: MEDICAID

## 2018-01-18 DIAGNOSIS — R47.1 DYSARTHRIA: ICD-10-CM

## 2018-01-18 DIAGNOSIS — R49.0 DYSPHONIA: ICD-10-CM

## 2018-01-18 DIAGNOSIS — R41.89 COGNITIVE DEFICITS: ICD-10-CM

## 2018-01-18 PROCEDURE — 92507 TX SP LANG VOICE COMM INDIV: CPT | Mod: PN

## 2018-01-18 NOTE — PROGRESS NOTES
OUTPATIENT NEUROLOGICAL REHABILITATION  SPEECH THERAPY PROGRESS NOTE    Date:  1/18/2018     Start Time:  1015  Stop Time:  1100    Subjective/History  Onset Date:  NF type I, is congenital  Primary Diagnosis:  Astrocytoma   Treatment Diagnosis:    Encounter Diagnoses   Name Primary?    Cognitive deficits     Dysarthria     Dysphonia       Referring Provider:  Dr. 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    Precautions:  Fall     TIMED  Procedure Min.   Cog tx  0         UNTIMED  Procedure Min.   Speech/lang tx 45 min           Total Minutes: 45  Total Timed Units: 0  Total Untimed Units: 1  Charges Billed/# of units: 1    Visit #:  16  Date of Evaluation:  10/5/17  Plan of Care Expiration:    2/7/18    Progress/Current Status  Subjective:   Pain: 0 /10  Pt requested working on language, word finding, and articulation in Danish.     Objective:     Short Term Goals: (8 weeks) Current Progress:   4. Pt will spell 4-6 letter words with 80% acc given min A to improve language skills.  -4 letter words MET previously  -5 letter word with 90% acc  -6 letter word with 60% acc Independently; 70% acc min A     5. Pt will participate in vocal function exercises (sustained phonation, pitch glides, etc) x 5 each with min A to improve vocal quality.  --Not addressed this session.     6. Pt will recall/utilize memory strategies (WRAP) with min A to assist in delayed recall.    reviewed    7. Pt will participate in delayed recall of 3 words given min A utilizing memory strategies.    -HAT - head, arm, toes - delayed recall of 3/3  -BAM - banana, apple, marbin - delayed recall of 3/3   9. Pt will participate in mental manipulation tasks with 80% acc given min A to  address attention.  Mod A       10. Pt will participate in articulation hierarchy exercises with 80% acc given min A to improve dysarthria.    -3 syllable words x 10 with mild dysarthria noted  -4 syllable words x 10 with mild dysarthria noted  -5 word sentences x 10 with mild dysarthria noted      11. Pt will name uncommon objects with 90% acc given min A to improve word finding skills.    -Not addressed this session.    13. Pt will participate in organization and attention tasks with 80% acc given min A.   Attention worksheet with min A      14. Pt will participate in immediate/recent recall given 3-5 words given min A with 80% acc. -Recall of three related words: MET previously   -recall of 4 related words: MET previously  Recall of 4 unrelated words: -Not addressed this session.      17. Pt will participate in games in order to address attention, word-finding, and logic with 80% acc independently  -Playing Taboo min-mod A          GOALS MET:   1. Pt will participate in a clinical swallow evaluation to assess swallow function. GOAL MET  2. Pt will answer complex yes/no questions with 80% acc given min A to address auditory comprehension. GOAL MET  8. Pt will name 15 abstract category members in 60 seconds with 80% acc given min A to improve word finding. GOAL MET  12. Pt will read 3-5 word sentences with 85% acc given min A. GOAL MET   15. Pt will list words that rhyme with 80% acc given min A to improve word finding. GOAL MET  16. Pt will spell 4-7 word sentences with 80% acc given min A.  GOAL MET        Assessment:   Pt is progressing towards goals. Current goals remain appropriate. Goals to be adjusted as necessary.      Mild dysarthria c/b slow rate of speech and imprecise articulation. Moderate dysphonia noted c/b decreased breath support, pitch variability, tremor, pitch breaks during sustained phonation and pitch glides. Speech intelligibility %. Mild dysphagia based on pt's report of coughing on  solids and thin liquids occasionaly; will assess further. Mild cognitive-linguistic deficits c/b impairments in attention, language, spelling, and delayed recall. Mild aphasia c/b occasional word-finding deficits. Patient will benefit from outpatient neurological rehabilitation speech therapy. Prognosis for improvement remains good.         Patient Education/Response:   Educated pt on goals and plan of care. Pt reported understanding.      Home Program: Lumosity and word finding activity from [facility].      Plans and Goals:      Continue POC with focus on cognition and word finding in Maltese.   Updated POC due 2/7/18.         Christel Aguayo M.S.CCC-SLP  Speech Language Pathologist   1/18/2018

## 2018-01-24 ENCOUNTER — OFFICE VISIT (OUTPATIENT)
Dept: FAMILY MEDICINE | Facility: HOSPITAL | Age: 28
End: 2018-01-24
Attending: FAMILY MEDICINE
Payer: MEDICAID

## 2018-01-24 VITALS
BODY MASS INDEX: 31.82 KG/M2 | SYSTOLIC BLOOD PRESSURE: 122 MMHG | WEIGHT: 147.5 LBS | HEART RATE: 84 BPM | DIASTOLIC BLOOD PRESSURE: 85 MMHG | HEIGHT: 57 IN

## 2018-01-24 DIAGNOSIS — Z71.3 WEIGHT LOSS COUNSELING, ENCOUNTER FOR: Primary | ICD-10-CM

## 2018-01-24 PROCEDURE — 99213 OFFICE O/P EST LOW 20 MIN: CPT | Performed by: FAMILY MEDICINE

## 2018-01-24 RX ORDER — DIAZEPAM 10 MG/1
TABLET ORAL
Refills: 0 | COMMUNITY
Start: 2017-12-26 | End: 2019-03-07

## 2018-01-24 RX ORDER — ONDANSETRON 4 MG/1
TABLET, ORALLY DISINTEGRATING ORAL
Refills: 0 | COMMUNITY
Start: 2018-01-10 | End: 2018-01-24 | Stop reason: SDUPTHER

## 2018-01-24 NOTE — PROGRESS NOTES
Subjective:       Patient ID: Kadie Mccann is a 27 y.o. female.    Chief Complaint: Follow-up    Ms. Mccann is a 27 year-old woman with a PMHx of grade II astrocytoma, type I neurofibromatosis, and mild cognitive and speech deficits who presented to clinic today for weight loss counseling. This past November, the patient was successfully dieting appropriately and had lost ~30 lbs in 2 months; the patient became weak and dehydrated and subsequently went to ED for fluids. The patient stated she was not drinking water as she does not like plain water. The patient has no other complaints today. She states that she wants to recover her strength and balance so that one day she can run again. She is currently working out at home in a fall safe environment.          Review of Systems   Constitutional: Negative for chills, diaphoresis, fatigue and fever.   HENT: Negative for nosebleeds, rhinorrhea and sneezing.    Eyes: Negative for pain.   Respiratory: Negative for apnea, cough, chest tightness, shortness of breath and wheezing.    Cardiovascular: Negative for chest pain and palpitations.   Gastrointestinal: Negative for abdominal pain, constipation, diarrhea, nausea and vomiting.   Endocrine: Negative.    Genitourinary: Negative for dysuria and urgency.   Musculoskeletal: Negative for arthralgias, back pain and myalgias.   Skin: Negative.  Negative for color change and rash.   Allergic/Immunologic: Negative.    Neurological: Negative for dizziness, weakness and light-headedness.   Hematological: Negative.    Psychiatric/Behavioral: Negative.  Negative for confusion, decreased concentration, dysphoric mood and sleep disturbance. The patient is not nervous/anxious.        Objective:      Vitals:    01/24/18 1020   BP: 122/85   Pulse: 84     Physical Exam   Constitutional: She is oriented to person, place, and time. She appears well-developed and well-nourished.   HENT:   Head: Normocephalic and atraumatic.   Nose:  Nose normal.   Mouth/Throat: Oropharynx is clear and moist.   Eyes: Conjunctivae and EOM are normal. Pupils are equal, round, and reactive to light. No scleral icterus.   Neck: Normal range of motion. Neck supple. No tracheal deviation present. No thyromegaly present.   Cardiovascular: Normal rate, regular rhythm, normal heart sounds and intact distal pulses.  Exam reveals no gallop and no friction rub.    No murmur heard.  Pulmonary/Chest: Effort normal and breath sounds normal. No respiratory distress. She has no wheezes. She has no rales.   Abdominal: Soft. Bowel sounds are normal. She exhibits no distension. There is no tenderness.   Musculoskeletal: Normal range of motion. She exhibits no edema.   Lymphadenopathy:     She has no cervical adenopathy.   Neurological: She is alert and oriented to person, place, and time.   Skin: Skin is warm and dry. No rash noted. No erythema.   Psychiatric: She has a normal mood and affect. Her behavior is normal. Judgment and thought content normal.       Assessment:       1. Weight loss counseling, encounter for        Plan:       Weight loss counseling:  - encourage water intake  - advised pt to loss about 2-3lbs/ week  - continue diet as is.  - have 30mins of exercise 4-5x/week     Pt elected to differ TDaP booster today. She was instructed to ask her neurologist at her next appointment if she is able to receive the vaccine at this time.     RTC PRN

## 2018-01-25 ENCOUNTER — CLINICAL SUPPORT (OUTPATIENT)
Dept: REHABILITATION | Facility: HOSPITAL | Age: 28
End: 2018-01-25
Attending: FAMILY MEDICINE
Payer: MEDICAID

## 2018-01-25 DIAGNOSIS — R49.0 DYSPHONIA: ICD-10-CM

## 2018-01-25 DIAGNOSIS — R47.1 DYSARTHRIA: ICD-10-CM

## 2018-01-25 DIAGNOSIS — R41.89 COGNITIVE DEFICITS: ICD-10-CM

## 2018-01-25 PROCEDURE — 92507 TX SP LANG VOICE COMM INDIV: CPT | Mod: PN

## 2018-01-30 ENCOUNTER — CLINICAL SUPPORT (OUTPATIENT)
Dept: REHABILITATION | Facility: HOSPITAL | Age: 28
End: 2018-01-30
Attending: FAMILY MEDICINE
Payer: MEDICAID

## 2018-01-30 DIAGNOSIS — R41.89 COGNITIVE DEFICITS: ICD-10-CM

## 2018-01-30 DIAGNOSIS — R49.0 DYSPHONIA: ICD-10-CM

## 2018-01-30 DIAGNOSIS — R47.1 DYSARTHRIA: ICD-10-CM

## 2018-01-30 PROCEDURE — 92507 TX SP LANG VOICE COMM INDIV: CPT | Mod: PN

## 2018-01-30 NOTE — PROGRESS NOTES
OUTPATIENT NEUROLOGICAL REHABILITATION  SPEECH THERAPY PROGRESS NOTE    Date:  1/30/2018     Start Time:  1015  Stop Time:  1100    Subjective/History  Onset Date:  NF type I, is congenital  Primary Diagnosis:  Astrocytoma   Treatment Diagnosis:    No diagnosis found.   Referring Provider:  Dr. 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    Precautions:  Fall     TIMED  Procedure Min.   Cog tx  0         UNTIMED  Procedure Min.   Speech/lang tx 45 min           Total Minutes: 45  Total Timed Units: 0  Total Untimed Units: 1  Charges Billed/# of units: 1    Visit #:  18  Date of Evaluation:  10/5/17  Plan of Care Expiration:    2/7/18    Progress/Current Status  Subjective:   Pain: 0 /10  Pt requested working on language, word finding, and articulation in Maldivian.  present.    Objective:     Short Term Goals: (8 weeks) Current Progress:   4. Pt will spell 4-6 letter words with 80% acc given min A to improve language skills.  -4 letter words MET previously  -5 letter word English -Not addressed this session.   -6 letter word English -Not addressed this session.     - 4 -5 letter words Maldivian 90% acc x 1 session -Not addressed this session.      5. Pt will participate in vocal function exercises (sustained phonation, pitch glides, etc) x 5 each with min A to improve vocal quality.  --Not addressed this session.     6. Pt will recall/utilize memory strategies (WRAP) with min A to assist in delayed recall.    reviewed    7. Pt will participate in delayed recall of 3 words given min A utilizing memory strategies.    -Not addressed this session.    9. Pt will participate in mental manipulation tasks with 80% acc given min A to address attention.   -Alphabetical order 3 words Stateless 80% acc       10. Pt will participate in articulation hierarchy exercises with 80% acc given min A to improve dysarthria.    -mod A      11. Pt will name uncommon objects with 90% acc given min A to improve word finding skills.    -Not addressed this session.    13. Pt will participate in organization and attention tasks with 80% acc given min A.   Attention worksheet with min A    14. Pt will participate in immediate/recent recall given 3-5 words given min A with 80% acc. -Recall of three related words: MET previously   -recall of 4 related words: MET previously  Recall of 4 unrelated words: -Not addressed this session.   - immediate recall in Stateless 3 words 100% acc   -immediate recall in Stateless 4 words with 60% acc       17. Pt will participate in games in order to address attention, word-finding, and logic with 80% acc independently  --Not addressed this session.        18. Pt will name 10 concrete, abstract, and specific letter category members with 80% acc given min A to improve word finding in Uzbek. Small x 10 Independently in 60 seconds (Uzbek)  Hot x 11 Independently in 60 seconds (Uzbek)  Cold x 11 Independently in 60 seconds (Uzbek)  /d/ x 11 Independently in 60 seconds (Uzbek)     19. Pt will translate sentences x 20 verbal and written with 80% acc given min A to improve word finding.   -3 word sentences x 10 with 100% acc verbally  -6 word sentences x 13 with 77% acc verbally      GOALS MET:   1. Pt will participate in a clinical swallow evaluation to assess swallow function. GOAL MET  2. Pt will answer complex yes/no questions with 80% acc given min A to address auditory comprehension. GOAL MET  8. Pt will name 15 abstract category members in 60 seconds with 80% acc given min A to improve word finding. GOAL MET  12. Pt will read 3-5 word sentences with 85% acc given min A. GOAL MET   15. Pt will list words that rhyme with 80% acc given min A to improve  word finding. GOAL MET  16. Pt will spell 4-7 word sentences with 80% acc given min A.  GOAL MET        Assessment:   Pt is progressing towards goals.  Continue to address language in Portuguese with the assistance of a . Current goals remain appropriate. Goals to be adjusted as necessary.      Mild dysarthria c/b slow rate of speech and imprecise articulation. Moderate dysphonia noted c/b decreased breath support, pitch variability, tremor, pitch breaks during sustained phonation and pitch glides. Speech intelligibility %. Mild dysphagia based on pt's report of coughing on solids and thin liquids occasionaly; will assess further. Mild cognitive-linguistic deficits c/b impairments in attention, language, spelling, and delayed recall. Mild aphasia c/b occasional word-finding deficits. Patient will benefit from outpatient neurological rehabilitation speech therapy. Prognosis for improvement remains good.         Patient Education/Response:   Educated pt on goals and plan of care. Pt reported understanding.      Home Program: Lumosity. Translate sentences aloud from English to Portuguese. Attempt writing translation. Sentences from Dysarthria Rehabilitation book.      Plans and Goals:      Continue POC with focus on cognition and word finding in Portuguese.   Updated POC due 2/7/18.   Pt would like to practice walking and talking at the same time.   Pt inquiring about [employer]. ST will look into it.         Christel Aguayo M.S.CCC-SLP  Speech Language Pathologist   1/30/2018

## 2018-02-01 ENCOUNTER — CLINICAL SUPPORT (OUTPATIENT)
Dept: REHABILITATION | Facility: HOSPITAL | Age: 28
End: 2018-02-01
Attending: FAMILY MEDICINE
Payer: MEDICAID

## 2018-02-01 DIAGNOSIS — R41.89 COGNITIVE DEFICITS: ICD-10-CM

## 2018-02-01 DIAGNOSIS — R49.0 DYSPHONIA: ICD-10-CM

## 2018-02-01 DIAGNOSIS — R47.1 DYSARTHRIA: ICD-10-CM

## 2018-02-01 PROCEDURE — 92507 TX SP LANG VOICE COMM INDIV: CPT | Mod: PN

## 2018-02-01 NOTE — PROGRESS NOTES
OUTPATIENT NEUROLOGICAL REHABILITATION  SPEECH THERAPY PROGRESS NOTE    Date:  2/1/2018     Start Time:  1015  Stop Time:  1100    Subjective/History  Onset Date:  NF type I, is congenital  Primary Diagnosis:  Astrocytoma   Treatment Diagnosis:  Dysarthria, dysphonia, cognitive deficits, mild expressive aphasia.   Referring Provider:  Dr. 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    Precautions:  Fall     TIMED  Procedure Min.   Cog tx  0         UNTIMED  Procedure Min.   Speech/lang tx 45 min           Total Minutes: 45  Total Timed Units: 0  Total Untimed Units: 1  Charges Billed/# of units: 1    Visit #:  18  Date of Evaluation:  10/5/17  Plan of Care Expiration:    2/7/18    Progress/Current Status  Subjective:   Pain: 0 /10  Pt requested working on language, word finding, and articulation in Tunisian.  present.  Pt translated sentences from English to Tunisian written x 30 with 4 errors noted;  checked translation.     Objective:     Short Term Goals: (8 weeks) Current Progress:   4. Pt will spell 4-6 letter words with 80% acc given min A to improve language skills.  -4 letter words MET previously  -5 letter word English -Not addressed this session.   -6 letter word English -Not addressed this session.     - 4 -5 letter words Tunisian 90% acc x 1 session -Not addressed this session.      5. Pt will participate in vocal function exercises (sustained phonation, pitch glides, etc) x 5 each with min A to improve vocal quality.  --sustained /a/ for 29 seconds with noted tremor   -sustained /i/ for 21 seconds decreased tremor  -sustained /u/ for 21 seconds with noted tremor  -sustained /m/ for 25 seconds with noted tremor    -Resonant  therapy exercises: /m/, /b/, /p/ x 75 each       6. Pt will recall/utilize memory strategies (WRAP) with min A to assist in delayed recall.    -Reviewed max A  -WRAP (write, repeat, associate, picture)    7. Pt will participate in delayed recall of 3 words given min A utilizing memory strategies.    -3 words in Bolivian with mod A; difficulty following instructions  -3 words x 2 occasions with min A in English   9. Pt will participate in mental manipulation tasks with 80% acc given min A to address attention.  -Alphabetical order 3 words Martiniquais MET x 1 session previously       10. Pt will participate in articulation hierarchy exercises with 80% acc given min A to improve dysarthria.    -mod A      11. Pt will name uncommon objects with 90% acc given min A to improve word finding skills.    -Not addressed this session.    13. Pt will participate in organization and attention tasks with 80% acc given min A.   Attention worksheet with min A    14. Pt will participate in immediate/recent recall given 3-5 words given min A with 80% acc. -Recall of three related words: MET previously   -recall of 4 related words: MET previously  Recall of 4 unrelated words: -Not addressed this session.   - immediate recall in Martiniquais 3 words MET previously  -immediate recall in Martiniquais 4 words -Not addressed this session.        17. Pt will participate in games in order to address attention, word-finding, and logic with 80% acc independently  --Not addressed this session.        18. Pt will name 10 concrete, abstract, and specific letter category members with 80% acc given min A to improve word finding in Bolivian. GOAL MET 2/1/18 -animals x 20 in 60 seconds in Bolivian GOAL MET  -inexpensive x 19 in 60 seconds in Bolivian GOAL MET   -/c/ x 16 in 60 seconds in Bolivian GOAL MET      19. Pt will translate sentences x 20 verbal and written with 80% acc given min A to improve word finding.   -3 word sentences x 10 with MET previously  -6 word  sentences x 13 with -Not addressed this session.       GOALS MET:   1. Pt will participate in a clinical swallow evaluation to assess swallow function. GOAL MET  2. Pt will answer complex yes/no questions with 80% acc given min A to address auditory comprehension. GOAL MET  8. Pt will name 15 abstract category members in 60 seconds with 80% acc given min A to improve word finding. GOAL MET  12. Pt will read 3-5 word sentences with 85% acc given min A. GOAL MET   15. Pt will list words that rhyme with 80% acc given min A to improve word finding. GOAL MET  16. Pt will spell 4-7 word sentences with 80% acc given min A.  GOAL MET        Assessment:   Pt is progressing towards goals.  Continue to address language in Slovenian with the assistance of a . Goal met for category naming in Kosovan. Current goals remain appropriate. Goals to be adjusted as necessary.      Mild dysarthria c/b slow rate of speech and imprecise articulation. Moderate dysphonia noted c/b decreased breath support, pitch variability, tremor, pitch breaks during sustained phonation and pitch glides. Speech intelligibility %. Mild dysphagia based on pt's report of coughing on solids and thin liquids occasionaly; will assess further. Mild cognitive-linguistic deficits c/b impairments in attention, language, spelling, and delayed recall. Mild aphasia c/b occasional word-finding deficits. Patient will benefit from outpatient neurological rehabilitation speech therapy. Prognosis for improvement remains good.         Patient Education/Response:   Educated pt on goals and plan of care. Pt reported understanding.      Home Program: Lumosity. Translate sentences aloud from English to Slovenian.  Writing translation again. Sentences from Dysarthria Rehabilitation book. Adding to the category, naming the category     Plans and Goals:      Continue POC with focus on cognition and word finding in Slovenian.   Updated POC due 2/7/18.   Pt would like to  practice walking and talking at the same time.       Christel Aguayo M.S.CCC-SLP  Speech Language Pathologist   2/1/2018

## 2018-02-06 ENCOUNTER — CLINICAL SUPPORT (OUTPATIENT)
Dept: REHABILITATION | Facility: HOSPITAL | Age: 28
End: 2018-02-06
Attending: FAMILY MEDICINE
Payer: MEDICAID

## 2018-02-06 DIAGNOSIS — R41.89 COGNITIVE DEFICITS: ICD-10-CM

## 2018-02-06 DIAGNOSIS — R47.1 DYSARTHRIA: ICD-10-CM

## 2018-02-06 DIAGNOSIS — R49.0 DYSPHONIA: ICD-10-CM

## 2018-02-06 PROCEDURE — 92507 TX SP LANG VOICE COMM INDIV: CPT | Mod: PN

## 2018-02-06 NOTE — PROGRESS NOTES
OUTPATIENT NEUROLOGICAL REHABILITATION  SPEECH THERAPY PROGRESS NOTE    Date:  2/6/2018     Start Time:  0930  Stop Time:  1015    Subjective/History  Onset Date:  NF type I, is congenital  Primary Diagnosis:  Astrocytoma   Treatment Diagnosis:  Dysarthria, dysphonia, cognitive deficits, mild expressive aphasia.   Referring Provider:  Dr. 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    Precautions:  Fall     TIMED  Procedure Min.   Cog tx  0         UNTIMED  Procedure Min.   Speech/lang tx 45 min           Total Minutes: 45  Total Timed Units: 0  Total Untimed Units: 1  Charges Billed/# of units: 1    Visit #:  19  Date of Evaluation:  10/5/17  Plan of Care Expiration:    2/7/18    Progress/Current Status  Subjective:   Pain: 0 /10  Pt requested working on language, word finding, and articulation in Fijian.  present.      Objective:     Short Term Goals: (8 weeks) Current Progress:   4. Pt will spell 4-6 letter words with 80% acc given min A to improve language skills.  -4 letter words MET previously  -5 letter word English -Not addressed this session.   -6 letter word English -Not addressed this session.     - 4 -5 letter words in Fijian x 20 with 80% acc GOAL MET x 2 sessions     5. Pt will participate in vocal function exercises (sustained phonation, pitch glides, etc) x 5 each with min A to improve vocal quality.  --sustained /a/ -Not addressed this session.   -sustained /i/ -Not addressed this session.   -sustained /u/ -Not addressed this session.   -sustained /m/ -Not addressed this session.     -Resonant therapy exercises: /m/, /b/, /p/ x 75 each -Not addressed this session.        6. Pt will recall/utilize memory strategies (WRAP) with  min A to assist in delayed recall.    -Reviewed max A  -WRAP (write, repeat, associate, picture)    7. Pt will participate in delayed recall of 3 words given min A utilizing memory strategies.    -3 words in Maltese with 100% acc x 2 sessions GOAL MET  - 3 words in English -Not addressed this session.    9. Pt will participate in mental manipulation tasks with 80% acc given min A to address attention.  -Alphabetical order 3 words Kyrgyz MET x 1 session previously -Not addressed this session.        10. Pt will participate in articulation hierarchy exercises with 80% acc given min A to improve dysarthria.    -Not addressed this session.     11. Pt will name uncommon objects with 90% acc given min A to improve word finding skills.    -verbally named common objects x 15 with 80% acc in English and 86% acc in Maltese  -2 significant delays noted during common object naming task  -verbally named uncommon objects x 10 with 80% acc in English and 50% acc in Maltese   13. Pt will participate in organization and attention tasks with 80% acc given min A.   -Not addressed this session.     14. Pt will participate in immediate/recent recall given 3-5 words given min A with 80% acc. -Recall of three related words: MET previously   -recall of 4 related words: MET previously  Recall of 4 unrelated words: -Not addressed this session.   - immediate recall in Kyrgyz 3 words MET previously  -immediate recall in Kyrgyz 4 words -Not addressed this session.        17. Pt will participate in games in order to address attention, word-finding, and logic with 80% acc independently  --Not addressed this session.        19. Pt will translate sentences x 20 verbal and written with 80% acc given min A to improve word finding.   -3 word sentences x 10 with MET previously  -6 word sentences x 13 with -Not addressed this session.   -4 word sentences from English to Maltese verbally x 12 with 0 errors noted      GOALS MET:   1. Pt will  participate in a clinical swallow evaluation to assess swallow function. GOAL MET  2. Pt will answer complex yes/no questions with 80% acc given min A to address auditory comprehension. GOAL MET  8. Pt will name 15 abstract category members in 60 seconds with 80% acc given min A to improve word finding. GOAL MET  12. Pt will read 3-5 word sentences with 85% acc given min A. GOAL MET   15. Pt will list words that rhyme with 80% acc given min A to improve word finding. GOAL MET  16. Pt will spell 4-7 word sentences with 80% acc given min A.  GOAL MET   18. Pt will name 10 concrete, abstract, and specific letter category members with 80% acc given min A to improve word finding in Bahamian. GOAL MET 2/1/18       Assessment:   Pt is progressing towards goals.  Continue to address language in Bahamian with the assistance of a . Goal met for delayed recall of 3 words in Bahamian. Current goals remain appropriate. Goals to be adjusted as necessary.      Mild dysarthria c/b slow rate of speech and imprecise articulation. Moderate dysphonia noted c/b decreased breath support, pitch variability, tremor, pitch breaks during sustained phonation and pitch glides. Speech intelligibility %. Mild dysphagia based on pt's report of coughing on solids and thin liquids occasionaly; will assess further. Mild cognitive-linguistic deficits c/b impairments in attention, language, spelling, and delayed recall. Mild aphasia c/b occasional word-finding deficits. Patient will benefit from outpatient neurological rehabilitation speech therapy. Prognosis for improvement remains good.         Patient Education/Response:   Educated pt on goals and plan of care. Pt reported understanding.      Home Program: [employer]. Translate sentences aloud from English to Bahamian.  Writing translation again. Sentences from Dysarthria Rehabilitation book. Adding to the category, naming the category from the [facility] in Bahamian.     Plans and Goals:       Continue POC with focus on cognition and word finding in Swedish.   Updated POC due 2/7/18.   Pt would like to practice walking and talking at the same time.       Christel Aguayo M.S.CCC-SLP  Speech Language Pathologist   2/6/2018

## 2018-02-08 ENCOUNTER — CLINICAL SUPPORT (OUTPATIENT)
Dept: REHABILITATION | Facility: HOSPITAL | Age: 28
End: 2018-02-08
Attending: FAMILY MEDICINE
Payer: MEDICAID

## 2018-02-08 DIAGNOSIS — R49.0 DYSPHONIA: ICD-10-CM

## 2018-02-08 DIAGNOSIS — R41.89 COGNITIVE DEFICITS: ICD-10-CM

## 2018-02-08 DIAGNOSIS — R47.1 DYSARTHRIA: ICD-10-CM

## 2018-02-08 PROCEDURE — 92507 TX SP LANG VOICE COMM INDIV: CPT | Mod: PN

## 2018-02-08 NOTE — PROGRESS NOTES
OUTPATIENT NEUROLOGICAL REHABILITATION  SPEECH THERAPY PROGRESS NOTE    Date:  2/8/2018     Start Time:  0930  Stop Time:  1015    Subjective/History  Onset Date:  NF type I, is congenital  Primary Diagnosis:  Astrocytoma   Treatment Diagnosis:  Dysarthria, dysphonia, cognitive deficits, mild expressive aphasia.   Referring Provider:  Dr. 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    Precautions:  Fall     TIMED  Procedure Min.   Cog tx  0         UNTIMED  Procedure Min.   Speech/lang tx 45 min           Total Minutes: 45  Total Timed Units: 0  Total Untimed Units: 1  Charges Billed/# of units: 1    Visit #:  20  Date of Evaluation:  10/5/17  Plan of Care Expiration:    2/7/18    Progress/Current Status  Subjective:   Pain: 0 /10  Pt requested working on language, word finding, and articulation in French.  present. Reviewed hw crossword puzzle and Missing Letters worksheets.    Objective:     Short Term Goals: (8 weeks) Current Progress:   4. Pt will spell 4-6 letter words with 80% acc given min A to improve language skills.  -4 letter words MET previously  -5 letter word English x 10 with 70% acc  -6 letter word English x 11 with 73% acc    - 4 -5 letter words in French x 20  GOAL MET x 2 sessions  - 6 letter words in French x 10 with  70% acc; define with 100% acc     5. Pt will participate in vocal function exercises (sustained phonation, pitch glides, etc) x 5 each with min A to improve vocal quality.  --sustained /a/ -Not addressed this session.   -sustained /i/ -Not addressed this session.   -sustained /u/ -Not addressed this session.   -sustained /m/ -Not addressed this session.     -Resonant therapy exercises: /m/, /b/, /p/ x 75 each  -Not addressed this session.        6. Pt will recall/utilize memory strategies (WRAP) with min A to assist in delayed recall.    -Reviewed max A  -WRAP (write, repeat, associate, picture)    7. Pt will participate in delayed recall of 3 words given min A utilizing memory strategies.    -3 words in Macanese  x 2 sessions GOAL MET  - 3 words in English -Not addressed this session.    9. Pt will participate in mental manipulation tasks with 80% acc given min A to address attention.  -Alphabetical order 3 words Hong Konger 60% acc       10. Pt will participate in articulation hierarchy exercises with 80% acc given min A to improve dysarthria.    -Not addressed this session.     11. Pt will name uncommon objects with 90% acc given min A to improve word finding skills.    -Not addressed this session.    13. Pt will participate in organization and attention tasks with 80% acc given min A.   -mod A    14. Pt will participate in immediate/recent recall given 3-5 words given min A with 80% acc. -Recall of three related words: MET previously   -recall of 4 related words: MET previously  Recall of 4 unrelated words: -Not addressed this session.   - immediate recall in [place] Konger 3 words MET previously  -immediate recall in [place] Konger 4 words -Not addressed this session.        17. Pt will participate in games in order to address attention, word-finding, and logic with 80% acc independently  --Not addressed this session.        19. Pt will translate sentences x 20 verbal and written with 80% acc given min A to improve word finding.   -3 word sentences x 10 with MET previously  -6 word sentences x 13 with -Not addressed this session.   -4 word sentences from English to Macanese verbally -Not addressed this session.       GOALS MET:   1. Pt will participate in a clinical swallow evaluation to assess swallow function. GOAL MET  2. Pt will answer complex yes/no questions with 80% acc given min A to address auditory comprehension. GOAL MET  8.  Pt will name 15 abstract category members in 60 seconds with 80% acc given min A to improve word finding. GOAL MET  12. Pt will read 3-5 word sentences with 85% acc given min A. GOAL MET   15. Pt will list words that rhyme with 80% acc given min A to improve word finding. GOAL MET  16. Pt will spell 4-7 word sentences with 80% acc given min A.  GOAL MET   18. Pt will name 10 concrete, abstract, and specific letter category members with 80% acc given min A to improve word finding in Guyanese. GOAL MET 2/1/18       Assessment:   Pt is progressing towards goals.  Continue to address language in Guyanese with the assistance of a . Current goals remain appropriate. Goals to be adjusted as necessary.      Mild dysarthria mostly resolved. Mild dysphonia noted c/b decreased breath support, pitch variability, tremor, pitch breaks during sustained phonation and pitch glides. Speech intelligibility %. Mild dysphagia reportedly resolved. Mild cognitive-linguistic deficits c/b impairments in attention, language, spelling, and delayed recall. Mild aphasia c/b occasional word-finding deficits. Patient will benefit from outpatient neurological rehabilitation speech therapy. Prognosis for improvement remains good.         Patient Education/Response:   Educated pt on goals and plan of care. Pt reported understanding.      Home Program: [facility]. Translate sentences aloud from English to Guyanese.  Writing translation again. Sentences from Dysarthria Rehabilitation book. Adding to the category, naming the category from the [facility] in Guyanese.     Plans and Goals:      Continue POC with focus on cognition and word finding in Guyanese.   Updated POC today.  Pt would like to practice walking and talking at the same time.       Christel Aguayo M.S.CCC-SLP  Speech Language Pathologist   2/8/2018

## 2018-02-16 NOTE — PLAN OF CARE
Date: 02/16/2018    SPEECH THERAPY UPDATED PLAN OF TREATMENT    Patient name: Kadie Mccann  Onset Date:  NF type I, is congenital  SOC Date:  10/5/17  Primary Diagnosis:  astrocytoma  1. Cognitive deficits     2. Dysarthria     3. Dysphonia       Treatment Diagnosis:  Cognitive deficits, dysarthria, dysphonia  Plan of Care Certification Period: 2/16/18 to 4/16/18  Precautions:  fall  Visits from SOC:  20      Updated Assessment:  Pt met 4 goals since last Plan of Care. Pt is progressing towards goals. Pt able to spell 5 letter words in English with 70% acc, 6 letter words in English with 73% acc, 6 letter words in Luxembourgish with 70% acc Independently.  Dysarthria mostly resolved. Dysphagia reportedly resolved. Dysphonia improved. Improvement noted in delayed recall in Luxembourgish. Alphabetical order in Upper sorbian with 60% acc.  Progressing towards mental manipulation goals. Met goal for immediate recall of 3-4 related words in English and 3 related word in Luxembourgish. Attention remains fleeting. Pt goes off on tangents. Progressing towards goal of translation; appropriate goal to help son at home.     GOALS MET or discontinued:  12. Pt will read 3-5 word sentences with 85% acc given min A. GOAL MET   15. Pt will list words that rhyme with 80% acc given min A to improve word finding. GOAL MET  16. Pt will spell 4-7 word sentences with 80% acc given min A.  GOAL MET   18. Pt will name 10 concrete, abstract, and specific letter category members with 80% acc given min A to improve word finding in [place]bourgish. GOAL MET 2/1/18  10. Pt will participate in articulation hierarchy exercises with 80% acc given min A to improve dysarthria. ...discontinue    Current short term goals:   4. Pt will spell 4-6 letter words with 80% acc given min A to improve language skills... progressing  5. Pt will participate in vocal function exercises (sustained phonation, pitch glides, etc) x 5 each with min A to improve vocal quality. . progressing  6.  Pt will recall/utilize memory strategies (WRAP) with min A to assist in delayed recall. . progressing   7. Pt will participate in delayed recall of 3 words given min A utilizing memory strategies. . progressing  9. Pt will participate in mental manipulation tasks with 80% acc given min A to address attention. . progressing  11. Pt will name uncommon objects with 90% acc given min A to improve word finding skills. . progressing  13. Pt will participate in organization and attention tasks with 80% acc given min A.. progressing  14. Pt will participate in immediate/recent recall given 3-5 words given min A with 80% acc.. progressing  17. Pt will participate in games in order to address attention, word-finding, and logic with 80% acc independently . progressing  19. Pt will translate sentences x 20 verbal and written with 80% acc given min A to improve word finding.. progressing    Long Term Goal Status:   continue per initial plan of care.  Reasons for Recertification of Therapy:   Kadie Montgomeryellon continues with Mild dysphonia noted c/b decreased breath support, pitch variability, tremor, pitch breaks during sustained phonation and pitch glides.  Mild dysarthria mostly resolved. Speech intelligibility %. Mild dysphagia reportedly resolved. Mild cognitive-linguistic deficits c/b impairments in attention, language, spelling, and delayed recall. Mild aphasia c/b occasional word-finding deficits. Patient will benefit from outpatient neurological rehabilitation speech therapy. Prognosis for improvement remains good.     Certification Period: Jan 2, 2018 to Dec 31, 2018  Recommended Treatment Plan: 2 times per week for 8 weeks: Patient Education, Self Care, Therapeutic Activites and Therapeutic Exercise  Other Recommendations: none at this time        Therapist's Name:   Christel Aguayo M.S.CCC-SLP  Speech Language Pathologist   Date: 02/16/2018    I CERTIFY THE NEED FOR THESE SERVICES FURNISHED UNDER THIS PLAN OF  TREATMENT AND WHILE UNDER MY CARE    Physician's comments: ________________________________________________________________________________________________________________________________________________      Physician's Name: ___________________________________

## 2018-02-22 ENCOUNTER — CLINICAL SUPPORT (OUTPATIENT)
Dept: REHABILITATION | Facility: HOSPITAL | Age: 28
End: 2018-02-22
Attending: FAMILY MEDICINE
Payer: MEDICAID

## 2018-02-22 DIAGNOSIS — R41.89 COGNITIVE DEFICITS: ICD-10-CM

## 2018-02-22 DIAGNOSIS — R49.0 DYSPHONIA: ICD-10-CM

## 2018-02-22 DIAGNOSIS — R47.1 DYSARTHRIA: ICD-10-CM

## 2018-02-22 PROCEDURE — 92507 TX SP LANG VOICE COMM INDIV: CPT | Mod: PN

## 2018-02-22 NOTE — PROGRESS NOTES
OUTPATIENT NEUROLOGICAL REHABILITATION  SPEECH THERAPY PROGRESS NOTE    Date:  2/22/2018     Start Time:  0930  Stop Time:  1015    Subjective/History  Onset Date:  NF type I, is congenital  Primary Diagnosis:  Astrocytoma   Treatment Diagnosis:  Dysarthria, dysphonia, cognitive deficits, mild expressive aphasia.   Referring Provider:  Dr. 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    Precautions:  Fall     TIMED  Procedure Min.   Cog tx  0         UNTIMED  Procedure Min.   Speech/lang tx 45 min           Total Minutes: 45  Total Timed Units: 0  Total Untimed Units: 1  Charges Billed/# of units: 1    Visit #:  20  Date of Evaluation:  10/5/17  Plan of Care Expiration:    2/7/18    Progress/Current Status  Subjective:   Pain: 0 /10  Pt requested working on language, word finding, and articulation in Hungarian.  present. Reviewed hw crossword puzzle and Missing Letters worksheets.    Objective:     Short Term Goals: (8 weeks) Current Progress:   4. Pt will spell 4-6 letter words with 80% acc given min A to improve language skills.  -4 letter words MET previously  -5 letter word English x 10 with 70% acc  -6 letter word English x 11 with 73% acc    - 4 -5 letter words in Hungarian x 20  GOAL MET x 2 sessions  - 6 letter words in Hungarian x 10 with  70% acc; define with 100% acc     5. Pt will participate in vocal function exercises (sustained phonation, pitch glides, etc) x 5 each with min A to improve vocal quality.  --sustained /a/ -Not addressed this session.   -sustained /i/ -Not addressed this session.   -sustained /u/ -Not addressed this session.   -sustained /m/ -Not addressed this session.     -Resonant therapy exercises: /m/, /b/, /p/ x 75  each -Not addressed this session.        6. Pt will recall/utilize memory strategies (WRAP) with min A to assist in delayed recall.    -Reviewed max A  -WRAP (write, repeat, associate, picture)    7. Pt will participate in delayed recall of 3 words given min A utilizing memory strategies.    -3 words in Guyanese  x 2 sessions GOAL MET  - 3 words in English -Not addressed this session.    9. Pt will participate in mental manipulation tasks with 80% acc given min A to address attention.  -Alphabetical order 3 words [place] Korean 60% acc       10. Pt will participate in articulation hierarchy exercises with 80% acc given min A to improve dysarthria.    -Not addressed this session.     11. Pt will name uncommon objects with 90% acc given min A to improve word finding skills.    -Not addressed this session.    13. Pt will participate in organization and attention tasks with 80% acc given min A.   -mod A    14. Pt will participate in immediate/recent recall given 3-5 words given min A with 80% acc. -Recall of three related words: MET previously   -recall of 4 related words: MET previously  Recall of 4 unrelated words: -Not addressed this session.   - immediate recall in [place] Korean 3 words MET previously  -immediate recall in North Korean 4 words -Not addressed this session.        17. Pt will participate in games in order to address attention, word-finding, and logic with 80% acc independently  --Not addressed this session.        19. Pt will translate sentences x 20 verbal and written with 80% acc given min A to improve word finding.   -3 word sentences x 10 with MET previously  -6 word sentences x 13 with -Not addressed this session.   -4 word sentences from English to Guyanese verbally -Not addressed this session.           Assessment:   Pt is progressing towards goals.  Continue to address language in Guyanese with the assistance of a . Current goals remain appropriate. Goals to be adjusted as necessary.      Mild dysarthria  mostly resolved. Mild dysphonia noted c/b decreased breath support, pitch variability, tremor, pitch breaks during sustained phonation and pitch glides. Speech intelligibility %. Mild dysphagia reportedly resolved. Mild cognitive-linguistic deficits c/b impairments in attention, language, spelling, and delayed recall. Mild aphasia c/b occasional word-finding deficits. Patient will benefit from outpatient neurological rehabilitation speech therapy. Prognosis for improvement remains good.         Patient Education/Response:   Educated pt on goals and plan of care. Pt reported understanding.      Home Program: [facility]. Translate sentences aloud from English to Nauruan.  Writing translation again. Sentences from Dysarthria Rehabilitation book. Adding to the category, naming the category from the [facility] in Nauruan.     Plans and Goals:      Continue POC with focus on cognition and word finding in Nauruan.   Updated POC today.  Pt would like to practice walking and talking at the same time.       Christel Aguayo M.S.CCC-SLP  Speech Language Pathologist   2/22/2018

## 2018-02-22 NOTE — PROGRESS NOTES
"OUTPATIENT NEUROLOGICAL REHABILITATION  SPEECH THERAPY PROGRESS NOTE    Date:  2/22/2018     Start Time:  1015  Stop Time:  1100    Subjective/History  Onset Date:  NF type I, is congenital  Primary Diagnosis:  Astrocytoma   Treatment Diagnosis:  Dysarthria, dysphonia, cognitive deficits, mild expressive aphasia.   Referring Provider:  Dr. 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    Precautions:  Fall     TIMED  Procedure Min.   Cog tx  0         UNTIMED  Procedure Min.   Speech/lang tx 45 min           Total Minutes: 45  Total Timed Units: 0  Total Untimed Units: 1  Charges Billed/# of units: 1    Visit #:  21  Date of Evaluation:  10/5/17  Plan of Care Expiration:   4/7/18    Progress/Current Status  Subjective:   Pain: 0 /10  Pt reported that she is making progress moving in to new home. Pt happy to be back at therapy after a week off. PT reports pt's gait is improved as she use to require a cane to walk. Pt reports her new apartment is on the second floor so she has to take the steps. She reportedly loves the challenge and stated that "it's not too hard."    Objective:     Short Term Goals: (8 weeks) Current Progress:   4. Pt will spell 4-6 letter words with 80% acc given min A to improve language skills.  --Not addressed this session.       5. Pt will participate in vocal function exercises (sustained phonation, pitch glides, etc) x 5 each with min A to improve vocal quality.  --sustained /a/ 18 seconds  -sustained /i/ 31 seconds  -sustained /u/ 34 seconds    -sustained /m/ 34 seconds     -Resonant therapy exercises: /m/, /b/, /p/ x 75 each       6. Pt will recall/utilize memory strategies (WRAP) with min A to assist in delayed recall.    " -Reviewed max A  -WRAP (write, repeat, associate, picture)    7. Pt will participate in delayed recall of 3 words given min A utilizing memory strategies. GOAL MET x 2 sessions   -3 words in Eritrean  x 2 sessions GOAL MET  - 3 words in English x 4 with 100% accuracy GOAL MET   9. Pt will participate in mental manipulation tasks with 80% acc given min A to address attention.  -Not addressed this session.       10. Pt will participate in articulation hierarchy exercises with 80% acc given min A to improve dysarthria.    -Not addressed this session.     11. Pt will name uncommon objects with 90% acc given min A to improve word finding skills.    -in English x 10 with 70% acc Independently; 80% acc min A   13. Pt will participate in organization and attention tasks with 80% acc given min A.   -min A walking and holding a conversation outside while naming uncommon objects    14. Pt will participate in immediate/recent recall given 3-5 words given min A with 80% acc. -Recall of three related words: MET previously   -recall of 4 related words: MET previously  Recall of 4 unrelated words: -Not addressed this session.   - immediate recall in Citizen of [place] 3 words MET previously  -immediate recall in Citizen of [place] 4 words -Not addressed this session.        17. Pt will participate in games in order to address attention, word-finding, and logic with 80% acc independently  --Not addressed this session.        19. Pt will translate sentences x 20 verbal and written with 80% acc given min A to improve word finding.   -3 word sentences x 10 with MET previously  -6 word sentences x 13 with -Not addressed this session.   -4 word sentences from English to Eritrean verbally -Not addressed this session.    Additional: Pt able to slowly walk and hold a conversation with multiple stops and breaks; believed to be unintentional.       Assessment:   Goal met for delayed recall of 3 words. Pt is progressing towards goals.  Continue to address language in  [place] with the assistance of a . Current goals remain appropriate. Goals to be adjusted as necessary.      Mild dysarthria mostly resolved. Mild dysphonia noted c/b decreased breath support, pitch variability, tremor, pitch breaks during sustained phonation and pitch glides. Speech intelligibility %. Mild dysphagia reportedly resolved. Mild cognitive-linguistic deficits c/b impairments in attention, language, spelling, and delayed recall. Mild aphasia c/b occasional word-finding deficits. Patient will benefit from outpatient neurological rehabilitation speech therapy. Prognosis for improvement remains good.         Patient Education/Response:   Educated pt on goals and plan of care. Pt reported understanding.      Home Program: Lumosity. Translate sentences aloud from English to [place].  Reading sentences from Dysarthria Rehabilitation book. Crossword WALC-1 in [place], Copyright 2002, [employer]    Plans and Goals:      Continue POC with focus on cognition and word finding in [place].   Updated POC 4/7/18.  Pt would like to practice walking and talking at the same time.       Christel Aguayo M.S.CCC-SLP  Speech Language Pathologist   2/22/2018

## 2018-02-27 ENCOUNTER — CLINICAL SUPPORT (OUTPATIENT)
Dept: REHABILITATION | Facility: HOSPITAL | Age: 28
End: 2018-02-27
Attending: FAMILY MEDICINE
Payer: MEDICAID

## 2018-02-27 DIAGNOSIS — R49.0 DYSPHONIA: ICD-10-CM

## 2018-02-27 DIAGNOSIS — R47.1 DYSARTHRIA: ICD-10-CM

## 2018-02-27 DIAGNOSIS — R41.89 COGNITIVE DEFICITS: ICD-10-CM

## 2018-02-27 PROCEDURE — 92507 TX SP LANG VOICE COMM INDIV: CPT | Mod: PN

## 2018-02-27 NOTE — PROGRESS NOTES
OUTPATIENT NEUROLOGICAL REHABILITATION  SPEECH THERAPY PROGRESS NOTE    Date:  2/27/2018     Start Time:  0930  Stop Time:  1015    Subjective/History  Onset Date:  NF type I, is congenital  Primary Diagnosis:  Astrocytoma   Treatment Diagnosis:  Dysarthria, dysphonia, cognitive deficits, mild expressive aphasia.   Referring Provider:  Dr. 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    Precautions:  Fall     TIMED  Procedure Min.   Cog tx  0         UNTIMED  Procedure Min.   Speech/lang tx 45 min           Total Minutes: 45  Total Timed Units: 0  Total Untimed Units: 1  Charges Billed/# of units: 1    Visit #:  22  Date of Evaluation:  10/5/17  Plan of Care Expiration:   4/7/18    Progress/Current Status  Subjective:   Pain: 0 /10     present. Pt upset due to family emergency in which her aunt was seriously injured in an accident. Pt pleasant and cooperative. Crossword hw returned partially completed. Completed in session with 83% accuracy. Written completing the sentence in Pashto x 20 with 95% accuracy independently; checked by . Pt requested second opinion neurologist within [facility].     Objective:     Short Term Goals: (8 weeks) Current Progress:   4. Pt will spell 4-6 letter words with 80% acc given min A to improve language skills.  - words of varying length x 13 with 84% accuracy in crossword          5. Pt will participate in vocal function exercises (sustained phonation, pitch glides, etc) x 5 each with min A to improve vocal quality.  --sustained /a/ -Not addressed this session.   -sustained /i/ -Not addressed this session.   -sustained /u/ -Not addressed this session.   -sustained /m/ -Not addressed this  session.    -Resonant therapy exercises: /m/, /b/, /p/ x 75 each -Not addressed this session.    -Vocal quality decreased in the beginning of the session c/b increased tremor and slow rate of speech likely secondary to emotional affect.  Significant mprovement noted 10 minutes into session.      6. Pt will recall/utilize memory strategies (WRAP) with min A to assist in delayed recall.    -Reviewed max A  -WRAP (write, repeat, associate, picture)  -Not addressed this session.     9. Pt will participate in mental manipulation tasks with 80% acc given min A to address attention.  -Not addressed this session.       10. Pt will participate in articulation hierarchy exercises with 80% acc given min A to improve dysarthria.    -Not addressed this session.     11. Pt will name uncommon objects with 90% acc given min A to improve word finding skills.    -in English x 17 with 82% acc Independently  -in Kyrgyz x 14 with 78% acc independently   13. Pt will participate in organization and attention tasks with 80% acc given min A.   --Not addressed this session.      14. Pt will participate in immediate/recent recall given 3-5 words given min A with 80% acc. Recall of 4 unrelated words in English x 5 with 40% accuracy independently   Recall of 4 unrelated word in Kyrgyz x 5 with 60% accuracy independently.          17. Pt will participate in games in order to address attention, word-finding, and logic with 80% acc independently  --Not addressed this session.        19. Pt will translate sentences x 20 verbal and written with 80% acc given min A to improve word finding.   -Crossword clues Kyrgyz to English  (4-7 words) x 12 with 66% acc  -Kyrgyz to English (4-6 words) x 20 with 90% acc   Additional: Multi-tasking of walking and talking not addressed this session.    Goals met:   7. Pt will participate in delayed recall of 3 words given min A utilizing memory strategies. GOAL MET x 2 sessions  14. Immediate recall in Bengali 3  words GOAL MET  14. Immediate recall of three related words in Guamanian: GOAL MET   14. Immediate recall of 4 related words in Guamanian: GOAL MET     Assessment:   Increase in naming uncommon objects in English. Increase in spelling, given support of crossword. Good maintenance of skills since 2 week break of therapy.   Pt is progressing towards goals.  Continue to address language in O[place]i with the assistance of a . Current goals remain appropriate. Goals to be adjusted as necessary.      Mild dysarthria mostly resolved. Mild dysphonia noted c/b decreased breath support, pitch variability, tremor, pitch breaks during sustained phonation and pitch glides. Speech intelligibility %. Mild dysphagia reportedly resolved. Mild cognitive-linguistic deficits c/b impairments in attention, language, spelling, and delayed recall. Mild aphasia c/b occasional word-finding deficits. Patient will benefit from outpatient neurological rehabilitation speech therapy. Prognosis for improvement remains good.         Patient Education/Response:   Educated pt on goals and plan of care. Pt reported understanding.      Home Program: Lumosity. Translate sentences aloud from English to Omani. Complete the sentence in Omani. Reading sentences from Dysarthria Rehabilitation book. Crossword WALC-1 in Omani, Copyright 2002, [employer]    Plans and Goals:      Continue POC with focus on cognition and word finding in Omani.   Updated POC 4/7/18.  Pt would like to practice walking and talking at the same time.       Christel Aguayo M.S.CCC-SLP  Speech Language Pathologist   2/27/2018

## 2018-03-01 ENCOUNTER — CLINICAL SUPPORT (OUTPATIENT)
Dept: REHABILITATION | Facility: HOSPITAL | Age: 28
End: 2018-03-01
Attending: FAMILY MEDICINE
Payer: MEDICAID

## 2018-03-01 DIAGNOSIS — R49.0 DYSPHONIA: ICD-10-CM

## 2018-03-01 DIAGNOSIS — R47.1 DYSARTHRIA: ICD-10-CM

## 2018-03-01 DIAGNOSIS — R41.89 COGNITIVE DEFICITS: ICD-10-CM

## 2018-03-01 PROCEDURE — 92507 TX SP LANG VOICE COMM INDIV: CPT | Mod: PN

## 2018-03-01 NOTE — PROGRESS NOTES
OUTPATIENT NEUROLOGICAL REHABILITATION  SPEECH THERAPY PROGRESS NOTE    Date:  3/1/2018     Start Time:  0930  Stop Time:  1015    Subjective/History  Onset Date:  NF type I, is congenital  Primary Diagnosis:  Astrocytoma   Treatment Diagnosis:  Dysarthria, dysphonia, cognitive deficits, mild expressive aphasia.   Referring Provider:  Dr. 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    Precautions:  Fall     TIMED  Procedure Min.   Cog tx  0         UNTIMED  Procedure Min.   Speech/lang tx 45 min           Total Minutes: 45  Total Timed Units: 0  Total Untimed Units: 1  Charges Billed/# of units: 1    Visit #:  22  Date of Evaluation:  10/5/17  Plan of Care Expiration:   4/7/18    Progress/Current Status  Subjective:   Pain: 0 /10     present. Pt upset due to family emergency in which her aunt was seriously injured in an accident. Pt pleasant and cooperative. Crossword hw returned partially completed. Completed in session with 83% accuracy. Written completing the sentence in Irish x 20 with 95% accuracy independently; checked by . Pt requested second opinion neurologist within [facility].     Objective:     Short Term Goals: (8 weeks) Current Progress:   4. Pt will spell 4-6 letter words with 80% acc given min A to improve language skills.  - words of varying length x 13 with 84% accuracy in crossword          5. Pt will participate in vocal function exercises (sustained phonation, pitch glides, etc) x 5 each with min A to improve vocal quality.  --sustained /a/ -Not addressed this session.   -sustained /i/ -Not addressed this session.   -sustained /u/ -Not addressed this session.   -sustained /m/ -Not addressed this  session.    -Resonant therapy exercises: /m/, /b/, /p/ x 75 each -Not addressed this session.    -Vocal quality decreased in the beginning of the session c/b increased tremor and slow rate of speech likely secondary to emotional affect.  Significant mprovement noted 10 minutes into session.      6. Pt will recall/utilize memory strategies (WRAP) with min A to assist in delayed recall.    -Reviewed max A  -WRAP (write, repeat, associate, picture)  -Not addressed this session.     9. Pt will participate in mental manipulation tasks with 80% acc given min A to address attention.  -Not addressed this session.       10. Pt will participate in articulation hierarchy exercises with 80% acc given min A to improve dysarthria.    -Not addressed this session.     11. Pt will name uncommon objects with 90% acc given min A to improve word finding skills.    -in English x 17 with 82% acc Independently  -in Syrian x 14 with 78% acc independently   13. Pt will participate in organization and attention tasks with 80% acc given min A.   --Not addressed this session.      14. Pt will participate in immediate/recent recall given 3-5 words given min A with 80% acc. Recall of 4 unrelated words in English x 5 with 40% accuracy independently   Recall of 4 unrelated word in Syrian x 5 with 60% accuracy independently.          17. Pt will participate in games in order to address attention, word-finding, and logic with 80% acc independently  --Not addressed this session.        19. Pt will translate sentences x 20 verbal and written with 80% acc given min A to improve word finding.   -Crossword clues Syrian to English  (4-7 words) x 12 with 66% acc  -Syrian to English (4-6 words) x 20 with 90% acc   Additional: Multi-tasking of walking and talking not addressed this session.    Goals met:   7. Pt will participate in delayed recall of 3 words given min A utilizing memory strategies. GOAL MET x 2 sessions  14. Immediate recall in Nepali 3  words GOAL MET  14. Immediate recall of three related words in Swiss: GOAL MET   14. Immediate recall of 4 related words in Swiss: GOAL MET     Assessment:   Increase in naming uncommon objects in English. Increase in spelling, given support of crossword. Good maintenance of skills since 2 week break of therapy.   Pt is progressing towards goals.  Continue to address language in Filipino with the assistance of a . Current goals remain appropriate. Goals to be adjusted as necessary.      Mild dysarthria mostly resolved. Mild dysphonia noted c/b decreased breath support, pitch variability, tremor, pitch breaks during sustained phonation and pitch glides. Speech intelligibility %. Mild dysphagia reportedly resolved. Mild cognitive-linguistic deficits c/b impairments in attention, language, spelling, and delayed recall. Mild aphasia c/b occasional word-finding deficits. Patient will benefit from outpatient neurological rehabilitation speech therapy. Prognosis for improvement remains good.         Patient Education/Response:   Educated pt on goals and plan of care. Pt reported understanding.      Home Program: Lumosity. Translate sentences aloud from English to Filipino. Complete the sentence in Filipino. Reading sentences from Dysarthria Rehabilitation book. Crossword WALC-1 in Filipino, Copyright 2002, [employer]    Plans and Goals:      Continue POC with focus on cognition and word finding in Filipino.   Updated POC 4/7/18.  Pt would like to practice walking and talking at the same time.       Christel Aguayo M.S.CCC-SLP  Speech Language Pathologist   3/1/2018               OUTPATIENT NEUROLOGICAL REHABILITATION  SPEECH THERAPY PROGRESS NOTE    Date:  3/1/2018     Start Time:  0930  Stop Time:  1015    Subjective/History  Onset Date:  NF type I, is congenital  Primary Diagnosis:  Astrocytoma   Treatment Diagnosis:  Dysarthria, dysphonia, cognitive deficits, mild expressive aphasia.   Referring  Provider:  Dr. 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    Precautions:  Fall     TIMED  Procedure Min.   Cog tx  0         UNTIMED  Procedure Min.   Speech/lang tx 45 min           Total Minutes: 45  Total Timed Units: 0  Total Untimed Units: 1  Charges Billed/# of units: 1    Visit #:  23  Date of Evaluation:  10/5/17  Plan of Care Expiration:   4/7/18    Progress/Current Status  Subjective:   Pain: 4 /10     present. Pt reported pain in back. Heat applied to area. Pain reduced.  Pt returned Complete the Sentence and Word Deduction homework incomplete. Portions completed were checked by  and the rest completed in session with 92%, 100% accuracy consecutively.      Objective:     Short Term Goals: (8 weeks) Current Progress:   4. Pt will spell 4-6 letter words with 80% acc given min A to improve language skills. GOAL MET x 1 session [place]an  -4 letters x 5 with 100% accuracy Independently  -5 letters x 7 with 100% accuracy Independently  -6 letters x 6 with 83% accuracy Independently         5. Pt will participate in vocal function exercises (sustained phonation, pitch glides, etc) x 5 each with min A to improve vocal quality.  -sustained /a/ 32 seconds  -sustained /i/ 30 seconds   -sustained /u/ 22 seconds   -sustained /m/ 32 seconds    -Resonant therapy exercises: /m/, /b/, /p/ x 75 each         6. Pt will recall/utilize memory strategies (WRAP) with min A to assist in delayed recall. GOAL MET x 1 session   -WRAP (write, repeat, associate, picture)  -3/4 recalled independently, 4/4 min A     9. Pt will participate in mental manipulation tasks with 80% acc given min A to address attention.  -Not addressed this session.       10.  Pt will participate in articulation hierarchy exercises with 80% acc given min A to improve dysarthria.    -Not addressed this session.     11. Pt will name uncommon objects with 90% acc given min A to improve word finding skills.    -Not addressed this session.     13. Pt will participate in organization and attention tasks with 80% acc given min A.   --Not addressed this session.      14. Pt will participate in immediate/recent recall given 3-5 words given min A with 80% acc. GOAL MET x 1 session Recall of 4 unrelated words in English x 5 with 100% accuracy independently   Recall of 4 unrelated word in [place] x 5 with 80% accuracy independently.          17. Pt will participate in games in order to address attention, word-finding, and logic with 80% acc independently  --Not addressed this session.        19. Pt will translate sentences x 20 verbal and written with 80% acc given min A to improve word finding.   -[place] Island[place] to English x 10 with 90% acc.      Additional: Multi-tasking of walking and talking not addressed this session.    Goals met:   7. Pt will participate in delayed recall of 3 words given min A utilizing memory strategies. GOAL MET x 2 sessions  14. Immediate recall in Mongolian 3 words GOAL MET  14. Immediate recall of three related words in Mongolian: GOAL MET   14. Immediate recall of 4 related words in Mongolian: GOAL MET     Assessment:   Increase in immediate recall of 4 words in English and in [place]. Improvement in vocal quality noted. Improvement in spelling 4-6 letter words. Pt is progressing towards goals.  Continue to address language in [place] with the assistance of a . Current goals remain appropriate. Goals to be adjusted as necessary.      Mild dysarthria mostly resolved. Mild dysphonia noted c/b decreased breath support, pitch variability, tremor, pitch breaks during sustained phonation and pitch glides. Speech intelligibility %. Mild dysphagia reportedly resolved. Mild  cognitive-linguistic deficits c/b impairments in attention, language, spelling, and delayed recall. Mild aphasia c/b occasional word-finding deficits. Patient will benefit from outpatient neurological rehabilitation speech therapy. Prognosis for improvement remains good.         Patient Education/Response:   Educated pt on goals and plan of care. Pt reported understanding.      Home Program: Lumosity. Translate sentences aloud from English to Australian. Complete the sentence in Australian. Reading sentences from Dysarthria Rehabilitation book. Crossword WALC-1 in Australian, Copyright 2002, [employer]    Plans and Goals:      Continue POC with focus on cognition and word finding in Australian.   Updated POC 4/7/18.  Pt would like to practice walking and talking at the same time.       Christel Aguayo M.S.CCC-SLP  Speech Language Pathologist   3/1/2018

## 2018-03-08 ENCOUNTER — CLINICAL SUPPORT (OUTPATIENT)
Dept: REHABILITATION | Facility: HOSPITAL | Age: 28
End: 2018-03-08
Attending: FAMILY MEDICINE
Payer: MEDICAID

## 2018-03-08 DIAGNOSIS — R47.1 DYSARTHRIA: ICD-10-CM

## 2018-03-08 DIAGNOSIS — R49.0 DYSPHONIA: ICD-10-CM

## 2018-03-08 DIAGNOSIS — R41.89 COGNITIVE DEFICITS: ICD-10-CM

## 2018-03-08 PROCEDURE — 92507 TX SP LANG VOICE COMM INDIV: CPT | Mod: PN

## 2018-03-08 NOTE — PROGRESS NOTES
OUTPATIENT NEUROLOGICAL REHABILITATION  SPEECH THERAPY PROGRESS NOTE    Date:  3/8/2018     Start Time:  1015  Stop Time:  1100    Subjective/History  Onset Date:  NF type I, is congenital  Primary Diagnosis:  Astrocytoma   Treatment Diagnosis:  Dysarthria, dysphonia, cognitive deficits, mild expressive aphasia.   Referring Provider:  Dr. 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    Precautions:  Fall     TIMED  Procedure Min.   Cog tx  0         UNTIMED  Procedure Min.   Speech/lang tx 45 min           Total Minutes: 45  Total Timed Units: 0  Total Untimed Units: 1  Charges Billed/# of units: 1    Visit #:  23  Date of Evaluation:  10/5/17  Plan of Care Expiration:   4/7/18    Progress/Current Status  Subjective:   Pain: 0 /10     not present today. All tasks presented in english. Pt reported body soreness since beginning to work out at [employer]. Pt requested and received recommendations regarding a neurologist as she is interested in a second opinion.      Objective:     Short Term Goals: (8 weeks) Current Progress:   4. Pt will spell 4-6 letter words with 80% acc given min A to improve language skills.  - 4 letters x 10 with 100% accuracy independently   - 5 letter x 10 with 90% accuracy independently   - 6 letters x 10 with 80% accuracy independently  GOAL MET x 2 sessions       5. Pt will participate in vocal function exercises (sustained phonation, pitch glides, etc) x 5 each with min A to improve vocal quality.  --sustained /a/ -Not addressed this session.   -sustained /i/ -Not addressed this session.   -sustained /u/ -Not addressed this session.   -sustained /m/ -Not addressed this session.    -Resonant therapy exercises: /m/,  /b/, /p/ x 75 each -Not addressed this session.        6. Pt will recall/utilize memory strategies (WRAP) with min A to assist in delayed recall. GOAL MET 3/8/18   -4/4 independently GOAL MET  -WRAP (write, repeat, associate, picture)       9. Pt will participate in mental manipulation tasks with 80% acc given min A to address attention.  -Not addressed this session.       10. Pt will participate in articulation hierarchy exercises with 80% acc given min A to improve dysarthria.    -Not addressed this session.     11. Pt will name uncommon objects with 90% acc given min A to improve word finding skills. GOAL MET 3/8/18   -in English x 10 with 100% accuracy GOAL MET x 2 sessions     13. Pt will participate in organization and attention tasks with 80% acc given min A.   -counting clubs with 100% accuracy   -naming the first letter of card presented with 100% accuracy, 4 delays   - sorting by suit while decoding with 80% accuracy independently   - sorting while spelling 4 letter words with 70% accuracy independently   - 3 minutes and 50 seconds to sort by suit       14. Pt will participate in immediate/recent recall given 3-5 words given min A with 80% acc. Recall of 4 unrelated words in English x 10 with 80% accuracy independently             17. Pt will participate in games in order to address attention, word-finding, and logic with 80% acc independently  --Not addressed this session.        19. Pt will translate sentences x 20 verbal and written with 80% acc given min A to improve word finding.   --Not addressed this session.     Additional: Multi-tasking of walking and talking not addressed this session.    Goals met:   7. Pt will participate in delayed recall of 3 words given min A utilizing memory strategies. GOAL MET x 2 sessions  14. Immediate recall in [place] 3 words GOAL MET  14. Immediate recall of three related words in [place]: GOAL MET   14. Immediate recall of 4 related words in [place]: GOAL MET   4.  Pt will spell 4-6 letter words with 80% acc given min A to improve language skills. GOAL MET 3/8/18   6. Pt will recall/utilize memory strategies (WRAP) with min A to assist in delayed recall. GOAL MET 3/8/18   11. Pt will name uncommon objects with 90% acc given min A to improve word finding skills. GOAL MET 3/8/18    Assessment:   Goals met for spelling, memory strategies, and naming uncommon objects in English. Baseline data collected on attention tasks. Good vocal quality noted today. Current goals remain appropriate. Goals to be adjusted as necessary.      Mild dysarthria mostly resolved. Mild dysphonia noted c/b decreased breath support, pitch variability, tremor, pitch breaks during sustained phonation and pitch glides. Speech intelligibility %. Mild dysphagia reportedly resolved. Mild cognitive-linguistic deficits c/b impairments in attention and language. Mild aphasia c/b occasional word-finding deficits. Patient will benefit from outpatient neurological rehabilitation speech therapy. Prognosis for improvement remains good.         Patient Education/Response:   Educated pt on goals and plan of care. Pt reported understanding.      Home Program: Lumosity. Translate sentences aloud from English to Tu[place]. Complete the sentence in Tu[place]. Reading sentences from Dysarthria Rehabilitation book. Crossword WALC-1 in [place], Copyright 2002, [employer]    Plans and Goals:      Continue POC with focus on cognition and word finding in [place].   Updated POC 4/7/18.  Pt would like to practice walking and talking at the same time.       Christel Aguayo M.S.CCC-SLP  Speech Language Pathologist   3/8/2018

## 2018-03-08 NOTE — PROGRESS NOTES
OUTPATIENT NEUROLOGICAL REHABILITATION  SPEECH THERAPY PROGRESS NOTE    Date:  3/8/2018     Start Time:  0930  Stop Time:  1015    Subjective/History  Onset Date:  NF type I, is congenital  Primary Diagnosis:  Astrocytoma   Treatment Diagnosis:  Dysarthria, dysphonia, cognitive deficits, mild expressive aphasia.   Referring Provider:  Dr. 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    Precautions:  Fall     TIMED  Procedure Min.   Cog tx  0         UNTIMED  Procedure Min.   Speech/lang tx 45 min           Total Minutes: 45  Total Timed Units: 0  Total Untimed Units: 1  Charges Billed/# of units: 1    Visit #:  22  Date of Evaluation:  10/5/17  Plan of Care Expiration:   4/7/18    Progress/Current Status  Subjective:   Pain: 0 /10     present. Pt upset due to family emergency in which her aunt was seriously injured in an accident. Pt pleasant and cooperative. Crossword hw returned partially completed. Completed in session with 83% accuracy. Written completing the sentence in Danish x 20 with 95% accuracy independently; checked by . Pt requested second opinion neurologist within [facility].     Objective:     Short Term Goals: (8 weeks) Current Progress:   4. Pt will spell 4-6 letter words with 80% acc given min A to improve language skills.  - words of varying length x 13 with 84% accuracy in crossword          5. Pt will participate in vocal function exercises (sustained phonation, pitch glides, etc) x 5 each with min A to improve vocal quality.  --sustained /a/ -Not addressed this session.   -sustained /i/ -Not addressed this session.   -sustained /u/ -Not addressed this session.   -sustained /m/ -Not addressed this  session.    -Resonant therapy exercises: /m/, /b/, /p/ x 75 each -Not addressed this session.    -Vocal quality decreased in the beginning of the session c/b increased tremor and slow rate of speech likely secondary to emotional affect.  Significant mprovement noted 10 minutes into session.      6. Pt will recall/utilize memory strategies (WRAP) with min A to assist in delayed recall.    -Reviewed max A  -WRAP (write, repeat, associate, picture)  -Not addressed this session.     9. Pt will participate in mental manipulation tasks with 80% acc given min A to address attention.  -Not addressed this session.       10. Pt will participate in articulation hierarchy exercises with 80% acc given min A to improve dysarthria.    -Not addressed this session.     11. Pt will name uncommon objects with 90% acc given min A to improve word finding skills.    -in English x 17 with 82% acc Independently  -in Citizen of [place] x 14 with 78% acc independently   13. Pt will participate in organization and attention tasks with 80% acc given min A.   --Not addressed this session.      14. Pt will participate in immediate/recent recall given 3-5 words given min A with 80% acc. Recall of 4 unrelated words in English x 5 with 40% accuracy independently   Recall of 4 unrelated word in Citizen of [place] x 5 with 60% accuracy independently.          17. Pt will participate in games in order to address attention, word-finding, and logic with 80% acc independently  --Not addressed this session.        19. Pt will translate sentences x 20 verbal and written with 80% acc given min A to improve word finding.   -Crossword clues Citizen of [place] to English  (4-7 words) x 12 with 66% acc  -Citizen [place] to English (4-6 words) x 20 with 90% acc   Additional: Multi-tasking of walking and talking not addressed this session.    Goals met:   7. Pt will participate in delayed recall of 3 words given min A utilizing memory strategies. GOAL MET x 2 sessions  14. Immediate recall in Lithuanian 3  words GOAL MET  14. Immediate recall of three related words in Congolese: GOAL MET   14. Immediate recall of 4 related words in Congolese: GOAL MET     Assessment:   Increase in naming uncommon objects in English. Increase in spelling, given support of crossword. Good maintenance of skills since 2 week break of therapy.   Pt is progressing towards goals.  Continue to address language in [place] with the assistance of a . Current goals remain appropriate. Goals to be adjusted as necessary.      Mild dysarthria mostly resolved. Mild dysphonia noted c/b decreased breath support, pitch variability, tremor, pitch breaks during sustained phonation and pitch glides. Speech intelligibility %. Mild dysphagia reportedly resolved. Mild cognitive-linguistic deficits c/b impairments in attention, language, spelling, and delayed recall. Mild aphasia c/b occasional word-finding deficits. Patient will benefit from outpatient neurological rehabilitation speech therapy. Prognosis for improvement remains good.         Patient Education/Response:   Educated pt on goals and plan of care. Pt reported understanding.      Home Program: Lumosity. Translate sentences aloud from English to Emirati. Complete the sentence in [place]ati. Reading sentences from Dysarthria Rehabilitation book. Crossword WALC-1 in [place], Copyright 2002, [employer]    Plans and Goals:      Continue POC with focus on cognition and word finding in [place].   Updated POC 4/7/18.  Pt would like to practice walking and talking at the same time.       Christel Aguayo M.S.CCC-SLP  Speech Language Pathologist   3/8/2018               OUTPATIENT NEUROLOGICAL REHABILITATION  SPEECH THERAPY PROGRESS NOTE    Date:  3/8/2018     Start Time:  0930  Stop Time:  1015    Subjective/History  Onset Date:  NF type I, is congenital  Primary Diagnosis:  Astrocytoma   Treatment Diagnosis:  Dysarthria, dysphonia, cognitive deficits, mild expressive aphasia.   Referring  Provider:  Dr. 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    Precautions:  Fall     TIMED  Procedure Min.   Cog tx  0         UNTIMED  Procedure Min.   Speech/lang tx 45 min           Total Minutes: 45  Total Timed Units: 0  Total Untimed Units: 1  Charges Billed/# of units: 1    Visit #:  23  Date of Evaluation:  10/5/17  Plan of Care Expiration:   4/7/18    Progress/Current Status  Subjective:   Pain: 4 /10     present. Pt reported pain in back. Heat applied to area. Pain reduced.  Pt returned Complete the Sentence and Word Deduction homework incomplete. Portions completed were checked by  and the rest completed in session with 92%, 100% accuracy consecutively.      Objective:     Short Term Goals: (8 weeks) Current Progress:   4. Pt will spell 4-6 letter words with 80% acc given min A to improve language skills. GOAL MET x 1 session B[place]an  -4 letters x 5 with 100% accuracy Independently  -5 letters x 7 with 100% accuracy Independently  -6 letters x 6 with 83% accuracy Independently         5. Pt will participate in vocal function exercises (sustained phonation, pitch glides, etc) x 5 each with min A to improve vocal quality.  -sustained /a/ 32 seconds  -sustained /i/ 30 seconds   -sustained /u/ 22 seconds   -sustained /m/ 32 seconds    -Resonant therapy exercises: /m/, /b/, /p/ x 75 each         6. Pt will recall/utilize memory strategies (WRAP) with min A to assist in delayed recall. GOAL MET x 1 session   -WRAP (write, repeat, associate, picture)  -3/4 recalled independently, 4/4 min A     9. Pt will participate in mental manipulation tasks with 80% acc given min A to address attention.  -Not addressed this session.       10.  Pt will participate in articulation hierarchy exercises with 80% acc given min A to improve dysarthria.    -Not addressed this session.     11. Pt will name uncommon objects with 90% acc given min A to improve word finding skills.    -Not addressed this session.     13. Pt will participate in organization and attention tasks with 80% acc given min A.   --Not addressed this session.      14. Pt will participate in immediate/recent recall given 3-5 words given min A with 80% acc. GOAL MET x 1 session Recall of 4 unrelated words in English x 5 with 100% accuracy independently   Recall of 4 unrelated word in [place] x 5 with 80% accuracy independently.          17. Pt will participate in games in order to address attention, word-finding, and logic with 80% acc independently  --Not addressed this session.        19. Pt will translate sentences x 20 verbal and written with 80% acc given min A to improve word finding.   -[place] to English x 10 with 90% acc.      Additional: Multi-tasking of walking and talking not addressed this session.    Goals met:   7. Pt will participate in delayed recall of 3 words given min A utilizing memory strategies. GOAL MET x 2 sessions  14. Immediate recall in Bulgarian 3 words GOAL MET  14. Immediate recall of three related words in Bulgarian: GOAL MET   14. Immediate recall of 4 related words in Bulgarian: GOAL MET     Assessment:   Increase in immediate recall of 4 words in English and in [place]. Improvement in vocal quality noted. Improvement in spelling 4-6 letter words. Pt is progressing towards goals.  Continue to address language in [place] with the assistance of a . Current goals remain appropriate. Goals to be adjusted as necessary.      Mild dysarthria mostly resolved. Mild dysphonia noted c/b decreased breath support, pitch variability, tremor, pitch breaks during sustained phonation and pitch glides. Speech intelligibility %. Mild dysphagia reportedly resolved. Mild  cognitive-linguistic deficits c/b impairments in attention, language, spelling, and delayed recall. Mild aphasia c/b occasional word-finding deficits. Patient will benefit from outpatient neurological rehabilitation speech therapy. Prognosis for improvement remains good.         Patient Education/Response:   Educated pt on goals and plan of care. Pt reported understanding.      Home Program: Lumosity. Translate sentences aloud from English to [place]ian. Complete the sentence in [place]ian. Reading sentences from Dysarthria Rehabilitation book. Crossword WALC-1 in [place]ian, Copyright 2002, [employer]    Plans and Goals:      Continue POC with focus on cognition and word finding in [place]ian.   Updated POC 4/7/18.  Pt would like to practice walking and talking at the same time.       Christel Aguayo M.S.CCC-SLP  Speech Language Pathologist   3/8/2018

## 2018-03-13 ENCOUNTER — CLINICAL SUPPORT (OUTPATIENT)
Dept: REHABILITATION | Facility: HOSPITAL | Age: 28
End: 2018-03-13
Attending: FAMILY MEDICINE
Payer: MEDICAID

## 2018-03-13 DIAGNOSIS — R49.0 DYSPHONIA: ICD-10-CM

## 2018-03-13 DIAGNOSIS — R47.1 DYSARTHRIA: ICD-10-CM

## 2018-03-13 DIAGNOSIS — R41.89 COGNITIVE DEFICITS: ICD-10-CM

## 2018-03-13 PROCEDURE — 92507 TX SP LANG VOICE COMM INDIV: CPT | Mod: PN

## 2018-03-13 NOTE — PROGRESS NOTES
OUTPATIENT NEUROLOGICAL REHABILITATION  SPEECH THERAPY PROGRESS NOTE    Date:  3/13/2018     Start Time:  1015  Stop Time:  1100    Subjective/History  Onset Date:  NF type I, is congenital  Primary Diagnosis:  Astrocytoma   Treatment Diagnosis:  Dysarthria, dysphonia, cognitive deficits, mild expressive aphasia.   Referring Provider:  Dr. 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    Precautions:  Fall     TIMED  Procedure Min.   Cog tx  0         UNTIMED  Procedure Min.   Speech/lang tx 45 min           Total Minutes: 45  Total Timed Units: 0  Total Untimed Units: 1  Charges Billed/# of units: 1    Visit #:  24  Date of Evaluation:  10/5/17  Plan of Care Expiration:   4/7/18    Progress/Current Status  Subjective:   Pain: 0 /10     present today. Pt pleasant. Pt did not return home program.     Objective:     Short Term Goals: (8 weeks) Current Progress:   5. Pt will participate in vocal function exercises (sustained phonation, pitch glides, etc) x 5 each with min A to improve vocal quality.  --sustained /a/ -Not addressed this session.   -sustained /i/ -Not addressed this session.   -sustained /u/ -Not addressed this session.   -sustained /m/ -Not addressed this session.    -Resonant therapy exercises: /m/, /b/, /p/ x 75 each -Not addressed this session.       9. Pt will participate in mental manipulation tasks with 80% acc given min A to address attention.  -unscramble sentences in Kazakh not direct translation min A       10. Pt will participate in articulation hierarchy exercises with 80% acc given min A to improve dysarthria.    -Not addressed this session.    13. Pt will participate in organization and attention tasks with 80% acc  given min A.   -Not addressed this session.        14. Pt will participate in immediate/recent recall given 3-5 words given min A with 80% acc. GOAL MET 3/13/18 Recall of 4 unrelated words in English x 11 with 90% accuracy independently   GOAL MET x 2 sessions            17. Pt will participate in games in order to address attention, word-finding, and logic with 80% acc independently  -Taboo in Cam[place]  -Guessed word x 10 in Cam[place]onian and English   -Described word x 5 in [place] and English       19. Pt will translate sentences x 20 verbal and written with 80% acc given min A to improve word finding. GOAL MET 3/13/18   -short phrases verbally x 20 with 100% accuracy independently      Additional: Multi-tasking of walking and talking not addressed this session.    Goals met:   7. Pt will participate in delayed recall of 3 words given min A utilizing memory strategies. GOAL MET x 2 sessions  14. Immediate recall in [place] 3 words GOAL MET  14. Immediate recall of three related words in [place]: GOAL MET   14. Immediate recall of 4 related words in [place]: GOAL MET   4. Pt will spell 4-6 letter words with 80% acc given min A to improve language skills. GOAL MET 3/8/18   6. Pt will recall/utilize memory strategies (WRAP) with min A to assist in delayed recall. GOAL MET 3/8/18   11. Pt will name uncommon objects with 90% acc given min A to improve word finding skills. GOAL MET 3/8/18   14. Pt will participate in immediate/recent recall given 3-5 words given min A with 80% acc. GOAL MET 3/13/18  19. Pt will translate sentences x 20 verbal and written with 80% acc given min A to improve word finding. GOAL MET 3/13/18    Assessment:   Tw goals met for immediate recall 4 unrelated words and translating sentences. Good understanding of new tasks-Taboo presented. Current goals remain appropriate. Goals to be adjusted as necessary.      Mild dysarthria mostly resolved. Mild dysphonia noted c/b decreased breath support, pitch  variability, tremor, pitch breaks during sustained phonation and pitch glides. Speech intelligibility %. Mild dysphagia reportedly resolved. Mild cognitive-linguistic deficits c/b impairments in attention and language. Mild aphasia c/b occasional word-finding deficits. Patient will benefit from outpatient neurological rehabilitation speech therapy. Prognosis for improvement remains good.         Patient Education/Response:   Educated pt on goals and plan of care. Pt reported understanding.      Home Program: Completing the Sentence WALC-2 Cognitive Rehab [place] Copyright 2007 [employer]  Fill in the letter given a category WALC-2. Simple crossword puzzle.     Plans and Goals:      Continue POC with focus on cognition and word finding in Welsh and English.   Updated POC 4/7/18.  Pt would like to practice walking and talking at the same time.       Chrisetl Aguayo M.S.CCC-SLP  Speech Language Pathologist   3/13/2018

## 2018-03-15 ENCOUNTER — CLINICAL SUPPORT (OUTPATIENT)
Dept: REHABILITATION | Facility: HOSPITAL | Age: 28
End: 2018-03-15
Attending: FAMILY MEDICINE
Payer: MEDICAID

## 2018-03-15 DIAGNOSIS — R47.1 DYSARTHRIA: ICD-10-CM

## 2018-03-15 DIAGNOSIS — R41.89 COGNITIVE DEFICITS: ICD-10-CM

## 2018-03-15 DIAGNOSIS — R49.0 DYSPHONIA: ICD-10-CM

## 2018-03-15 PROCEDURE — 92507 TX SP LANG VOICE COMM INDIV: CPT | Mod: PN

## 2018-03-15 NOTE — PROGRESS NOTES
OUTPATIENT NEUROLOGICAL REHABILITATION  SPEECH THERAPY PROGRESS NOTE    Date:  3/15/2018     Start Time:  1015  Stop Time:  1100    Subjective/History  Onset Date:  NF type I, is congenital  Primary Diagnosis:  Astrocytoma   Treatment Diagnosis:  Dysarthria, dysphonia, cognitive deficits, mild expressive aphasia.   Referring Provider:  Dr. 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    Precautions:  Fall     TIMED  Procedure Min.   Cog tx  0         UNTIMED  Procedure Min.   Speech/lang tx 45 min           Total Minutes: 45  Total Timed Units: 0  Total Untimed Units: 1  Charges Billed/# of units: 1    Visit #:  25  Date of Evaluation:  10/5/17  Plan of Care Expiration:   4/7/18    Progress/Current Status  Subjective:   Pain: 0 /10     present today. Pt pleasant. Pt reported hair falling out and has not yet scheduled appointment with neurologist (for 2nd opinion). Pt returned homework.     Objective:     Short Term Goals: (8 weeks) Current Progress:   5. Pt will participate in vocal function exercises (sustained phonation, pitch glides, etc) x 5 each with min A to improve vocal quality.  --sustained /a/ -Not addressed this session.   -sustained /i/ -Not addressed this session.   -sustained /u/ -Not addressed this session.   -sustained /m/ -Not addressed this session.    -Resonant therapy exercises: /m/, /b/, /p/ x 75 each -Not addressed this session.       9. Pt will participate in mental manipulation tasks with 80% acc given min A to address attention.  -Reverse 3 related words in South [place]ese x 15 with 67% accuracy independently        10. Pt will participate in articulation hierarchy exercises with 80% acc given min A to improve dysarthria.    -Not  addressed this session.    13. Pt will participate in organization and attention tasks with 80% acc given min A.   -Sorting cards by suit while decoding 4 letter word in Ukrainian x 15 with 67% accuracy with multiple repetitions and pauses while sorting   -Sorting cards by color while decoding Comoran and English words x 20 with 85% accuracy with multiple repetitions and pauses while sorting     -Decode 3-4 letter words in English x 10 with 80% accuracy   -Spell words verbally in English x 15 with 100% accuracy      17. Pt will participate in games in order to address attention, word-finding, and logic with 80% acc independently  -Not addressed this session        Additional: Multi-tasking of walking and talking not addressed this session.  Add: Pt will describe objects using 3 attributes in Ukrainian and in english x 10 objects with 90% accuracy in English and 100% accuracy in Comoran min A    Goals met:   7. Pt will participate in delayed recall of 3 words given min A utilizing memory strategies. GOAL MET x 2 sessions  14. Immediate recall in Ukrainian 3 words GOAL MET  14. Immediate recall of three related words in Ukrainian: GOAL MET   14. Immediate recall of 4 related words in Ukrainian: GOAL MET   4. Pt will spell 4-6 letter words with 80% acc given min A to improve language skills. GOAL MET 3/8/18   6. Pt will recall/utilize memory strategies (WRAP) with min A to assist in delayed recall. GOAL MET 3/8/18   11. Pt will name uncommon objects with 90% acc given min A to improve word finding skills. GOAL MET 3/8/18   14. Pt will participate in immediate/recent recall given 3-5 words given min A with 80% acc. GOAL MET 3/13/18  19. Pt will translate sentences x 20 verbal and written with 80% acc given min A to improve word finding. GOAL MET 3/13/18      Assessment:   New tasks implemented. Some difficulty with attention and mental manipulation tasks. Baseline data collected. Current goals remain appropriate. Goals to be  adjusted as necessary.      Mild dysarthria mostly resolved. Mild dysphonia noted c/b decreased breath support, pitch variability, tremor, pitch breaks during sustained phonation and pitch glides. Speech intelligibility %. Mild dysphagia reportedly resolved. Mild cognitive-linguistic deficits c/b impairments in attention and language. Mild aphasia c/b occasional word-finding deficits. Patient will benefit from outpatient neurological rehabilitation speech therapy. Prognosis for improvement remains good.         Patient Education/Response:   Educated pt on goals and plan of care. Pt reported understanding.      Home Program: Identify and spell the object in Cambodian and English, name the category member in Cambodian, and give the definition and function worksheets  [facility] Cambodian Copyright 2007 [employer]  Fill in the letter given a category [facility].     Plans and Goals:      Continue POC with focus on cognition and word finding in Cambodian and English.   Updated POC 4/7/18.  Pt would like to practice walking and talking at the same time.       Christel Aguayo M.S.CCC-SLP  Speech Language Pathologist   3/15/2018

## 2018-03-19 ENCOUNTER — TELEPHONE (OUTPATIENT)
Dept: NEUROLOGY | Facility: CLINIC | Age: 28
End: 2018-03-19

## 2018-03-19 NOTE — TELEPHONE ENCOUNTER
----- Message from Debbie Mccoy MA sent at 3/19/2018  4:43 PM CDT -----  Contact: pt @ [phone]  Can you possibly schedule this patient with some one   ----- Message -----  From: Lyric Mosquera  Sent: 3/19/2018   4:31 PM  To: Debbie Mccoy MA, [facility] Staff    Caller asking to schedule an appt with Dr. Vasquez, says she was told by another patient to seek the care of Dr. Vasquez, however, from what she says is going on when I search Dr. Vasquez does not come up on the list of doctors to select from. She does have a slight speech impairment, but she says she also have movement disorder.

## 2018-03-20 ENCOUNTER — CLINICAL SUPPORT (OUTPATIENT)
Dept: REHABILITATION | Facility: HOSPITAL | Age: 28
End: 2018-03-20
Attending: FAMILY MEDICINE
Payer: MEDICAID

## 2018-03-20 DIAGNOSIS — R47.1 DYSARTHRIA: ICD-10-CM

## 2018-03-20 DIAGNOSIS — R41.89 COGNITIVE DEFICITS: ICD-10-CM

## 2018-03-20 DIAGNOSIS — R49.0 DYSPHONIA: ICD-10-CM

## 2018-03-20 PROCEDURE — 92507 TX SP LANG VOICE COMM INDIV: CPT | Mod: PN

## 2018-03-20 NOTE — PROGRESS NOTES
OUTPATIENT NEUROLOGICAL REHABILITATION  SPEECH THERAPY PROGRESS NOTE    Date:  3/20/2018     Start Time:  1015  Stop Time:  1100    Subjective/History  Onset Date:  NF type I, is congenital  Primary Diagnosis:  Astrocytoma   Treatment Diagnosis:  Dysarthria, dysphonia, cognitive deficits, mild expressive aphasia.   Referring Provider:  Dr. 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    Precautions:  Fall     TIMED  Procedure Min.   Cog tx  0         UNTIMED  Procedure Min.   Speech/lang tx 45 min           Total Minutes: 45  Total Timed Units: 0  Total Untimed Units: 1  Charges Billed/# of units: 1    Visit #:  26  Date of Evaluation:  10/5/17  Plan of Care Expiration:   4/7/18    Progress/Current Status  Subjective:   Pain: 0 /10     present today. Pt reported feeling self-conscious about her slow rate of speech. [facility] returned phone call stating that they are not accepting new Medicaid patients at the current time.     Objective:     Short Term Goals: (8 weeks) Current Progress:   5. Pt will participate in vocal function exercises (sustained phonation, pitch glides, etc) x 5 each with min A to improve vocal quality.  --sustained /a/ -Not addressed this session.   -sustained /i/ -Not addressed this session.   -sustained /u/ -Not addressed this session.   -sustained /m/ -Not addressed this session.    -Resonant therapy exercises: /m/, /b/, /p/ x 75 each        9. Pt will participate in mental manipulation tasks with 80% acc given min A to address attention.  -Reverse 3 related words in Mohawk x 15 with 60% accuracy independently        13. Pt will participate in organization and attention tasks with 80% acc given  min A.   -Sorting cards by suit while decoding 4 letter word in Luxembourgish and english x 15 with 56% accuracy independently and 75% accuracy given repetitions   -completed in 6 minutes and 11 seconds     17. Pt will participate in games in order to address attention, word-finding, and logic with 80% acc independently.  -Not addressed this session        18. Pt will describe objects using 3 attributes in Taiwanese and English x 10 with 90% acc given min A.      - x 10 in English and in Taiwanese with 100% accuracy independently    Additional: Multi-tasking of walking and talking not addressed this session.      Goals met:   7. Pt will participate in delayed recall of 3 words given min A utilizing memory strategies. GOAL MET x 2 sessions  14. Immediate recall in Luxembourgish 3 words GOAL MET  14. Immediate recall of three related words in Luxembourgish: GOAL MET   14. Immediate recall of 4 related words in Luxembourgish: GOAL MET   4. Pt will spell 4-6 letter words with 80% acc given min A to improve language skills. GOAL MET 3/8/18   6. Pt will recall/utilize memory strategies (WRAP) with min A to assist in delayed recall. GOAL MET 3/8/18   11. Pt will name uncommon objects with 90% acc given min A to improve word finding skills. GOAL MET 3/8/18   14. Pt will participate in immediate/recent recall given 3-5 words given min A with 80% acc. GOAL MET 3/13/18  19. Pt will translate sentences x 20 verbal and written with 80% acc given min A to improve word finding. GOAL MET 3/13/18      Assessment:   Decreased accuracy with reverse order of 3 words in Taiwanese. Decreased accuracy of decoding 4 letter words in Taiwanese and in English. Current goals remain appropriate. Goals to be adjusted as necessary.      Mild dysarthria mostly resolved. Mild dysphonia noted c/b decreased breath support, pitch variability, tremor, pitch breaks during sustained phonation and pitch glides. Speech intelligibility %. Mild dysphagia reportedly resolved. Mild  cognitive-linguistic deficits c/b impairments in attention and language. Mild aphasia c/b occasional word-finding deficits. Patient will benefit from outpatient neurological rehabilitation speech therapy. Prognosis for improvement remains good.         Patient Education/Response:   Educated pt on goals and plan of care. Pt reported understanding.      Home Program: Practice RVT. Page 190 Finish the sentence worksheet from [facility] 1.     Plans and Goals:      Continue POC with focus on cognition and word finding in Maori and English.   Updated POC 4/7/18.  Pt would like to practice walking and talking at the same time.       Christel Aguayo M.S.CCC-SLP  Speech Language Pathologist   3/20/2018

## 2018-03-27 ENCOUNTER — CLINICAL SUPPORT (OUTPATIENT)
Dept: REHABILITATION | Facility: HOSPITAL | Age: 28
End: 2018-03-27
Attending: FAMILY MEDICINE
Payer: MEDICAID

## 2018-03-27 DIAGNOSIS — R47.1 DYSARTHRIA: ICD-10-CM

## 2018-03-27 DIAGNOSIS — R49.0 DYSPHONIA: ICD-10-CM

## 2018-03-27 DIAGNOSIS — R41.89 COGNITIVE DEFICITS: ICD-10-CM

## 2018-03-27 PROCEDURE — 92507 TX SP LANG VOICE COMM INDIV: CPT | Mod: PN

## 2018-03-27 NOTE — PROGRESS NOTES
OUTPATIENT NEUROLOGICAL REHABILITATION  SPEECH THERAPY PROGRESS NOTE    Date:  3/27/2018     Start Time:  1015  Stop Time:  1100    Subjective/History  Onset Date:  NF type I, is congenital  Primary Diagnosis:  Astrocytoma   Treatment Diagnosis:  Dysarthria, dysphonia, cognitive deficits, mild expressive aphasia.   Referring Provider:  Dr. 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    Precautions:  Fall     TIMED  Procedure Min.   Cog tx  0         UNTIMED  Procedure Min.   Speech/lang tx 45 min           Total Minutes: 45  Total Timed Units: 0  Total Untimed Units: 1  Charges Billed/# of units: 1    Visit #:  27  Date of Evaluation:  10/5/17  Plan of Care Expiration:   4/7/18    Progress/Current Status  Subjective:   Pain: 0 /10    Pt pleasant. Denies pain.  present today. Pt has appointment with Neurosurgery April 16.     Objective:     Short Term Goals: (8 weeks) Current Progress:   5. Pt will participate in vocal function exercises (sustained phonation, pitch glides, etc) x 5 each with min A to improve vocal quality.  -sustained /a/ 24 seconds  -sustained /i/  26 seconds   -sustained /u/ 31 seconds   -sustained /m/ 21 seconds    -Resonant therapy exercises: /m/, /b/, /p/ x 75 each        9. Pt will participate in mental manipulation tasks with 80% acc given min A to address attention.  -Reverse 3 related words in Citizen of [place] x 15 with 90% accuracy independently   -GOAL MET x 1 session     13. Pt will participate in organization and attention tasks with 80% acc given min A.   -Sorting cards by suit while decoding 4 letter word in Citizen of [place] x 11 with  81% accuracy and  In english with 100% accuracy   -completed in 5 minutes and 52 seconds     17.  "Pt will participate in games in order to address attention, word-finding, and logic with 80% acc independently.  -"Heads Up" Act it out x 6 and animal categories x 2 mod A English; 3 min A Comoran       18. Pt will describe objects using 3 attributes in Comoran and English x 10 with 90% acc given min A.      - x 10 in English and in Comoran with 100% accuracy independently   GOAL MET x 2 sessions    Additional: Multi-tasking of walking and talking not addressed this session.      Goals met:   7. Pt will participate in delayed recall of 3 words given min A utilizing memory strategies. GOAL MET x 2 sessions  14. Immediate recall in Swazi 3 words GOAL MET  14. Immediate recall of three related words in Swazi: GOAL MET   14. Immediate recall of 4 related words in Swazi: GOAL MET   4. Pt will spell 4-6 letter words with 80% acc given min A to improve language skills. GOAL MET 3/8/18   6. Pt will recall/utilize memory strategies (WRAP) with min A to assist in delayed recall. GOAL MET 3/8/18   11. Pt will name uncommon objects with 90% acc given min A to improve word finding skills. GOAL MET 3/8/18   14. Pt will participate in immediate/recent recall given 3-5 words given min A with 80% acc. GOAL MET 3/13/18  19. Pt will translate sentences x 20 verbal and written with 80% acc given min A to improve word finding. GOAL MET 3/13/18  18. Pt will describe objects using 3 attributes in Comoran and English x 10 with 90% acc given min A.       Assessment:   Increased accuracy with reverse order of 3 words in Comoran. Increased accuracy of decoding 4 letter words in Comoran and in English. Decreased time taken to complete card task. Minor delay noted in describing items in English as opposed to Comoran. Current goals remain appropriate. Goals to be adjusted as necessary.      Mild dysarthria mostly resolved. Mild dysphonia noted c/b decreased breath support, pitch variability, tremor, pitch breaks during sustained phonation " and pitch glides. Speech intelligibility %. Mild dysphagia reportedly resolved. Mild cognitive-linguistic deficits c/b impairments in attention and language. Mild aphasia c/b occasional word-finding deficits. Patient will benefit from outpatient neurological rehabilitation speech therapy. Prognosis for improvement remains good.         Patient Education/Response:   Educated pt on goals and plan of care. Pt reported understanding.      Home Program: Practice RVT. Page 190 Finish the sentence worksheet from [facility] 1.     Plans and Goals:      Continue POC with focus on cognition and word finding in Faroese and English.   Updated POC 4/7/18.  Pt would like to practice walking and talking at the same time.       Christel Aguayo M.S.CCC-SLP  Speech Language Pathologist   3/27/2018

## 2018-04-05 ENCOUNTER — CLINICAL SUPPORT (OUTPATIENT)
Dept: REHABILITATION | Facility: HOSPITAL | Age: 28
End: 2018-04-05
Attending: FAMILY MEDICINE
Payer: MEDICAID

## 2018-04-05 DIAGNOSIS — R47.1 DYSARTHRIA: ICD-10-CM

## 2018-04-05 DIAGNOSIS — R49.0 DYSPHONIA: ICD-10-CM

## 2018-04-05 DIAGNOSIS — R41.89 COGNITIVE DEFICITS: ICD-10-CM

## 2018-04-05 PROCEDURE — 92507 TX SP LANG VOICE COMM INDIV: CPT | Mod: PN

## 2018-04-05 NOTE — PROGRESS NOTES
OUTPATIENT NEUROLOGICAL REHABILITATION  SPEECH THERAPY PROGRESS NOTE    Date:  4/5/2018     Start Time:  1015  Stop Time:  1100    Subjective/History  Onset Date:  NF type I, is congenital  Primary Diagnosis:  Astrocytoma   Treatment Diagnosis:  Dysarthria, dysphonia, cognitive deficits, mild expressive aphasia.   Referring Provider:  Dr. 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    Precautions:  Fall     TIMED  Procedure Min.   Cog tx  0         UNTIMED  Procedure Min.   Speech/lang tx 45 min           Total Minutes: 45  Total Timed Units: 0  Total Untimed Units: 1  Charges Billed/# of units: 1    Visit #:  28  Date of Evaluation:  10/5/17  Plan of Care Expiration:   4/7/18    Progress/Current Status  Subjective:   Pain: 0 /10    Pt pleasant. Denies pain.  present today. Pt has appointment with Neurosurgery April 16.     Objective:     Short Term Goals: (8 weeks) Current Progress:   5. Pt will participate in vocal function exercises (sustained phonation, pitch glides, etc) x 5 each with min A to improve vocal quality. GOAL MET and ongoing -sustained /a/ 18 seconds  -sustained /i/  25 seconds   -sustained /u/ 24 seconds   -sustained /m/ 25 seconds    -Resonant therapy exercises: /m/, /b/, /p/ x 75 each        9. Pt will participate in mental manipulation tasks with 80% acc given min A to address attention. GOAL MET -Reverse 3 related words in Czech x 15 with 67% accuracy independently   -pt distracted by son  -GOAL MET x 1 session      13. Pt will participate in organization and attention tasks with 80% acc given min A. GOAL MET   -Sorting cards by jose roberto while decoding 4 letter word in Czech x 10 with  85% accuracy and  In english with 100%  "accuracy   -completed in 5 minutes and 15 seconds     17. Pt will participate in games in order to address attention, word-finding, and logic with 80% acc independently. GOAL MET -"Heads Up" x 5 with 85% acc given min A       Additional: Multi-tasking of walking and talking not addressed this session.    Rodney Cognitive Assessment (MOCA)  The Rodney Cognitive Assessment (MoCA) was designed as a rapid screening instrument for mild cognitive dysfunction. It assesses different cognitive domains: attention and concentration, executive functions, memory, language, visuoconstructional skills, conceptual thinking, calculations, and orientation. The total possible score is 30 points; a score of 26 or above is considered normal.    Visuospatial/Executive 4/5   Naming 3/3   Attention 1/2    1/1    3/3   Language 1/2    1/1   Abstraction 2/2   Delayed Recall 4/5   Orientation 6/6   Total 26/30     Pt received a score of 26 out of 30 which is considered normal.     Goals met:   7. Pt will participate in delayed recall of 3 words given min A utilizing memory strategies. GOAL MET x 2 sessions  14. Immediate recall in Kyrgyz 3 words GOAL MET  14. Immediate recall of three related words in Kyrgyz: GOAL MET   14. Immediate recall of 4 related words in Kyrgyz: GOAL MET   4. Pt will spell 4-6 letter words with 80% acc given min A to improve language skills. GOAL MET 3/8/18   6. Pt will recall/utilize memory strategies (WRAP) with min A to assist in delayed recall. GOAL MET 3/8/18   11. Pt will name uncommon objects with 90% acc given min A to improve word finding skills. GOAL MET 3/8/18   14. Pt will participate in immediate/recent recall given 3-5 words given min A with 80% acc. GOAL MET 3/13/18  19. Pt will translate sentences x 20 verbal and written with 80% acc given min A to improve word finding. GOAL MET 3/13/18  18. Pt will describe objects using 3 attributes in Omani and English x 10 with 90% acc given min A. GOAL " MET x 2 sessions      Assessment:   Pt met all goals. Pt increased score on MOCA. Pt has met all goals. Plan to discharge today.     Mild dysarthria mostly resolved. Mild dysphonia improved but remains with decreased breath support, pitch variability, tremor, pitch breaks during sustained phonation and pitch glides. Speech intelligibility %.  Mild cognitive-linguistic deficits c/b impairments in attention and language; improved. Mild aphasia c/b occasional word-finding deficits; improved. Need for further ST is not indicated at this time.         Patient Education/Response:   Pt educated on goals met and result of MOCA. Discussed discharge and pt agreeable.       Home Program: Numerous cognitive and word finding worksheets from the [employer], [employer], to continue practicing at home.     Plans and Goals:      Discharge today.     Christel Aguayo M.S.CCC-SLP  Speech Language Pathologist   4/5/2018     ________________________________________________________________________________________________  REHAB SERVICES OUTPATIENT DISCHARGE SUMMARY  Speech Therapy      Name:  Kadie CAMERON Siobhan  Date:  4/6/18  Date of Evaluation:  10/5/17  Physician:  Dr. Fiordaliza Marquez  Total # Of Visits:  28    Diagnosis:    1. Cognitive deficits     2. Dysarthria     3. Dysphonia         Physical/Functional Status:  At time of discharge, patient was able to see above note dated 4/5/18. Pt has improved overall memory, vocal function, sequencing, attention, delayed recall, word finding, and speech.     The patient is to be discharged from our Therapy service for the following reason(s):  Patient has met all of her goals.    Degree of Goal Achievement:  Patient has met all goals    Patient Education:  Pt educated on goals met and result of MOCA. Discussed discharge and pt agreeable.     Discharge Plan: Home program: Numerous cognitive and word finding worksheets from the [facility], [employer], to continue practicing at home.   Follow up with PCP. Follow up with neurology. Return to speech therapy if cognitive/speech/voice symptoms decline.    Christel Aguayo M.S.CCC-SLP  Speech Language Pathologist

## 2018-04-06 ENCOUNTER — TELEPHONE (OUTPATIENT)
Dept: FAMILY MEDICINE | Facility: HOSPITAL | Age: 28
End: 2018-04-06

## 2018-04-06 PROBLEM — R47.1 DYSARTHRIA: Status: RESOLVED | Noted: 2017-10-13 | Resolved: 2018-04-06

## 2018-04-06 PROBLEM — R41.89 COGNITIVE DEFICITS: Status: RESOLVED | Noted: 2017-10-13 | Resolved: 2018-04-06

## 2018-04-06 PROBLEM — R49.0 DYSPHONIA: Status: RESOLVED | Noted: 2017-10-13 | Resolved: 2018-04-06

## 2018-04-06 NOTE — TELEPHONE ENCOUNTER
----- Message from Michelle Aburto sent at 4/6/2018 10:41 AM CDT -----  PT CALL SAYING PHARMACY TOLD HER TO CALL US, SHE THINK THEY NEED AN AUTH FOR levonorgestrel-ethinyl estradiol (TRIVORA, 28,) 50-30 (6)/75-40 (5)/125-30(10) per tablet

## 2018-04-09 DIAGNOSIS — Z30.41 ORAL CONTRACEPTIVE USE: ICD-10-CM

## 2018-04-09 RX ORDER — LEVONORGESTREL AND ETHINYL ESTRADIOL 6-5-10
1 KIT ORAL DAILY
Qty: 28 TABLET | Refills: 11 | Status: CANCELLED | OUTPATIENT
Start: 2018-04-09

## 2018-04-09 NOTE — TELEPHONE ENCOUNTER
----- Message from Katharine Vera sent at 4/9/2018  2:25 PM CDT -----  Patient is calling to request a refill on her birth control , Trivora. Patient is asking that someone call her once prescription has been called in because she is completely out of medication.

## 2018-04-16 ENCOUNTER — OFFICE VISIT (OUTPATIENT)
Dept: NEUROSURGERY | Facility: CLINIC | Age: 28
End: 2018-04-16
Payer: MEDICAID

## 2018-04-16 VITALS
SYSTOLIC BLOOD PRESSURE: 120 MMHG | BODY MASS INDEX: 32.68 KG/M2 | DIASTOLIC BLOOD PRESSURE: 74 MMHG | TEMPERATURE: 99 F | HEART RATE: 86 BPM | WEIGHT: 151.5 LBS | HEIGHT: 57 IN

## 2018-04-16 DIAGNOSIS — C71.6 CEREBELLAR ASTROCYTOMA: Primary | ICD-10-CM

## 2018-04-16 DIAGNOSIS — Q85.00 NF (NEUROFIBROMATOSIS): ICD-10-CM

## 2018-04-16 PROCEDURE — 99214 OFFICE O/P EST MOD 30 MIN: CPT | Mod: PBBFAC | Performed by: NEUROLOGICAL SURGERY

## 2018-04-16 PROCEDURE — 99999 PR PBB SHADOW E&M-EST. PATIENT-LVL IV: CPT | Mod: PBBFAC,,, | Performed by: NEUROLOGICAL SURGERY

## 2018-04-16 PROCEDURE — 99204 OFFICE O/P NEW MOD 45 MIN: CPT | Mod: S$PBB,,, | Performed by: NEUROLOGICAL SURGERY

## 2018-04-16 RX ORDER — ALPRAZOLAM 1 MG/1
1 TABLET ORAL 2 TIMES DAILY
Qty: 10 TABLET | Refills: 0 | Status: SHIPPED | OUTPATIENT
Start: 2018-04-16 | End: 2019-03-21 | Stop reason: ALTCHOICE

## 2018-04-16 NOTE — PROGRESS NOTES
This office note has been dictated.  Kadie Mccann was seen in neurosurgical consultation at the office this   morning.  She is a 27-year-old lady with neurofibromatosis type 1 who developed   a pain syndrome at age 12 where she would intermittently get severe pain   involving the entire right side of her body, face, arm and leg.  Apparently, no   specific diagnosis was made until about two years ago when MRI showed a   posterior fossa tumor and she underwent posterior fossa craniectomy and biopsy   at [facility].  She was told that this was a low-grade   astrocytoma, but did undergo radiation therapy and chemotherapy.  During the   course of her adjunctive therapy, she began to develop increasing speech   difficulty, gait ataxia and some clumsiness of the upper extremities.  The pain   syndrome; however, has disappeared.  She has been followed by the [facility] on the [facility], but wished to have her treatment transferred to [facility]   and was seen in neurosurgical consultation here.  She has a son who also has NF1   and possibly some other family relatives.  She is not working and cares for her   son at home. Review of systems is otherwise unremarkable.    On physical examination, she is a well-developed, well-nourished Latin-American   lady who is alert and cooperative.  She has numerous cafe-au-lait spots over her   extremities and torso.  Examination of the head shows a well-healed midline   occipital incision.  The scalp is nontender.  Eyes show full extraocular   movements.  There is mild vertical nystagmus.  Pupils are equal and reactive to   light and fundi show clear disk margins.  She says she is seeing adequately.    Hearing is reduced on the left.  The neck is supple.  On neurological   examination, she has some speech hesitation and mild dysarthria.  Speech is   appropriate and memory is good.  Finger-to-nose shows some dysmetria   bilaterally, worse on the left.  She has an  intermittent head bobbing tremor.    Gait is rather unsteady and ataxic.  Cranial nerve examination shows touch   generally intact, although there is a loss of temperature to touch on the right   side of her face.  Facial movement is good and the tongue is midline.  Strength   in the extremities seems preserved.  Sensation again somewhat asymmetric in   regard to temperature.  Deep tendon reflexes are hypoactive, but symmetrical.    A CT scan of the brain without contrast was done at [facility] on 11/28/17.    The suboccipital craniotomy is noted.  There is decreased attenuation in the   left cerebellum and middle cerebellar peduncle and what appears to be a nodule   extending toward the mesencephalon.    IMPRESSION:  1.  Cerebellar and brainstem astrocytoma.  2.  Neurofibromatosis type 1.    I will have MRI of the brain and spine done to serve as a baseline for us and   see her back to review this.      RDS/HN  dd: 04/16/2018 10:43:46 (CDT)  td: 04/17/2018 07:48:46 (CDT)  Doc ID   #[id number]  Job ID #[id number]    CC: Kadie Mccann

## 2018-04-23 ENCOUNTER — HOSPITAL ENCOUNTER (OUTPATIENT)
Dept: RADIOLOGY | Facility: HOSPITAL | Age: 28
Discharge: HOME | End: 2018-04-23
Attending: NEUROLOGICAL SURGERY
Payer: MEDICAID

## 2018-04-23 DIAGNOSIS — Q85.00 NF (NEUROFIBROMATOSIS): ICD-10-CM

## 2018-04-23 DIAGNOSIS — C71.6 CEREBELLAR ASTROCYTOMA: ICD-10-CM

## 2018-04-23 PROCEDURE — 72156 MRI NECK SPINE W/O & W/DYE: CPT | Mod: 26,,, | Performed by: RADIOLOGY

## 2018-04-23 PROCEDURE — 72158 MRI LUMBAR SPINE W/O & W/DYE: CPT | Mod: 26,,, | Performed by: RADIOLOGY

## 2018-04-23 PROCEDURE — 70553 MRI BRAIN STEM W/O & W/DYE: CPT | Mod: TC

## 2018-04-23 PROCEDURE — 72157 MRI CHEST SPINE W/O & W/DYE: CPT | Mod: 26,,, | Performed by: RADIOLOGY

## 2018-04-23 PROCEDURE — 72157 MRI CHEST SPINE W/O & W/DYE: CPT | Mod: TC

## 2018-04-23 PROCEDURE — 72158 MRI LUMBAR SPINE W/O & W/DYE: CPT | Mod: TC

## 2018-04-23 PROCEDURE — 25500020 PHARM REV CODE 255: Performed by: NEUROLOGICAL SURGERY

## 2018-04-23 PROCEDURE — 70553 MRI BRAIN STEM W/O & W/DYE: CPT | Mod: 26,,, | Performed by: RADIOLOGY

## 2018-04-23 PROCEDURE — 72156 MRI NECK SPINE W/O & W/DYE: CPT | Mod: TC

## 2018-04-23 PROCEDURE — A9585 GADOBUTROL INJECTION: HCPCS | Performed by: NEUROLOGICAL SURGERY

## 2018-04-23 RX ORDER — GADOBUTROL 604.72 MG/ML
8 INJECTION INTRAVENOUS
Status: COMPLETED | OUTPATIENT
Start: 2018-04-23 | End: 2018-04-23

## 2018-04-23 RX ADMIN — GADOBUTROL 8 ML: 604.72 INJECTION INTRAVENOUS at 07:04

## 2018-05-07 ENCOUNTER — OFFICE VISIT (OUTPATIENT)
Dept: NEUROSURGERY | Facility: CLINIC | Age: 28
End: 2018-05-07
Payer: MEDICAID

## 2018-05-07 VITALS
WEIGHT: 152.38 LBS | BODY MASS INDEX: 30.72 KG/M2 | HEART RATE: 88 BPM | DIASTOLIC BLOOD PRESSURE: 61 MMHG | SYSTOLIC BLOOD PRESSURE: 141 MMHG | HEIGHT: 59 IN

## 2018-05-07 DIAGNOSIS — D36.14 NEUROFIBROMA OF THORACIC REGION: ICD-10-CM

## 2018-05-07 DIAGNOSIS — Q85.01 NEUROFIBROMATOSIS, TYPE 1: Primary | ICD-10-CM

## 2018-05-07 PROCEDURE — 99213 OFFICE O/P EST LOW 20 MIN: CPT | Mod: S$PBB,,, | Performed by: NEUROLOGICAL SURGERY

## 2018-05-07 PROCEDURE — 99999 PR PBB SHADOW E&M-EST. PATIENT-LVL III: CPT | Mod: PBBFAC,,, | Performed by: NEUROLOGICAL SURGERY

## 2018-05-07 PROCEDURE — 99213 OFFICE O/P EST LOW 20 MIN: CPT | Mod: PBBFAC | Performed by: NEUROLOGICAL SURGERY

## 2018-05-07 NOTE — PROGRESS NOTES
This office note has been dictated.  Kadie Mccann returned in neurosurgical followup to the office today.  She   has been stable over the past three weeks.    MRI of the brain and spine were reviewed.  MRI of the brain shows the previous   suboccipital craniotomy.  There is an enhancing mass in the left superior   cerebellar peduncle representing the known astrocytoma.  There is not an MRI for   direct comparison, but CT scan done here on 11/28/17 shows a mass about the   same size.  On a CT of 03/18/14, the mass appears a little larger.  There may   have been some shrinkage following radiation therapy.  MRI of the cervical and   thoracic spine are unremarkable.  MRI of the lumbar spine shows a small   enhancing mass at T12-L1.  This is intradural and extramedullary on the anterior   part of the dura.  This seems most consistent with a neurofibroma, although a   drop metastasis from the cerebellar astrocytoma is possible.  There is an old   healed compression fracture at T12.    She will try to collect records on the exact pathology and what has been done in   the past.  I will have MRI of the lumbar spine repeated in about six months to   see if a lumbar lesion is stable.  I believe we can wait one year before getting   a followup MRI of the brain to monitor the cerebellar brainstem tumor.      RDS/HN  dd: 05/07/2018 14:02:31 (CDT)  td: 05/08/2018 08:21:22 (CDT)  Doc ID   #[id number]  Job ID #[id number]    CC: Kadie Mccann

## 2018-10-01 ENCOUNTER — TELEPHONE (OUTPATIENT)
Dept: NEUROSURGERY | Facility: CLINIC | Age: 28
End: 2018-10-01

## 2018-10-01 NOTE — TELEPHONE ENCOUNTER
AUTUMN PT THIS AM, SCHED MRI L-SPINE & FU WITH DR SANTIAGO. DATE & TIMES ARE OK WITH HER. APPT SLIP IN THE MAIL.

## 2018-10-01 NOTE — TELEPHONE ENCOUNTER
----- Message from Arun Kinsey sent at 10/1/2018  8:40 AM CDT -----  Contact: Patient @ [phone] before 4pm   Patient is calling to schedule the recall visit, ( system did not allow me to schedule ), pls call

## 2018-11-05 ENCOUNTER — HOSPITAL ENCOUNTER (OUTPATIENT)
Dept: RADIOLOGY | Facility: HOSPITAL | Age: 28
Discharge: HOME | End: 2018-11-05
Attending: NEUROLOGICAL SURGERY
Payer: MEDICAID

## 2018-11-05 ENCOUNTER — OFFICE VISIT (OUTPATIENT)
Dept: NEUROSURGERY | Facility: CLINIC | Age: 28
End: 2018-11-05
Payer: MEDICAID

## 2018-11-05 VITALS
TEMPERATURE: 98 F | WEIGHT: 148.88 LBS | DIASTOLIC BLOOD PRESSURE: 71 MMHG | SYSTOLIC BLOOD PRESSURE: 120 MMHG | BODY MASS INDEX: 30.07 KG/M2 | HEART RATE: 89 BPM

## 2018-11-05 DIAGNOSIS — C71.6 CEREBELLAR ASTROCYTOMA: Primary | ICD-10-CM

## 2018-11-05 DIAGNOSIS — D36.14 NEUROFIBROMA OF THORACIC REGION: ICD-10-CM

## 2018-11-05 DIAGNOSIS — Q85.01 NEUROFIBROMATOSIS, TYPE 1: ICD-10-CM

## 2018-11-05 DIAGNOSIS — Q85.01 NEUROFIBROMATOSIS, TYPE I (VON RECKLINGHAUSEN'S DISEASE): ICD-10-CM

## 2018-11-05 PROCEDURE — 99999 PR PBB SHADOW E&M-EST. PATIENT-LVL III: CPT | Mod: PBBFAC,,, | Performed by: NEUROLOGICAL SURGERY

## 2018-11-05 PROCEDURE — A9585 GADOBUTROL INJECTION: HCPCS | Performed by: NEUROLOGICAL SURGERY

## 2018-11-05 PROCEDURE — 25500020 PHARM REV CODE 255: Performed by: NEUROLOGICAL SURGERY

## 2018-11-05 PROCEDURE — 72158 MRI LUMBAR SPINE W/O & W/DYE: CPT | Mod: TC

## 2018-11-05 PROCEDURE — 72158 MRI LUMBAR SPINE W/O & W/DYE: CPT | Mod: 26,,, | Performed by: RADIOLOGY

## 2018-11-05 PROCEDURE — 99213 OFFICE O/P EST LOW 20 MIN: CPT | Mod: S$PBB,,, | Performed by: NEUROLOGICAL SURGERY

## 2018-11-05 PROCEDURE — 99213 OFFICE O/P EST LOW 20 MIN: CPT | Mod: PBBFAC,25 | Performed by: NEUROLOGICAL SURGERY

## 2018-11-05 RX ORDER — GADOBUTROL 604.72 MG/ML
7 INJECTION INTRAVENOUS
Status: COMPLETED | OUTPATIENT
Start: 2018-11-05 | End: 2018-11-05

## 2018-11-05 RX ORDER — METRONIDAZOLE 500 MG/1
500 TABLET ORAL 2 TIMES DAILY
Refills: 0 | COMMUNITY
Start: 2018-09-17 | End: 2019-03-07

## 2018-11-05 RX ADMIN — GADOBUTROL 7 ML: 604.72 INJECTION INTRAVENOUS at 02:11

## 2018-11-05 NOTE — PROGRESS NOTES
This office note has been dictated.  Kadie Mccann was seen in neurosurgical followup at the office this   afternoon.  She has been doing reasonably well over the last six months.  She   feels that her speech is getting a little better.  She is working hard at trying   to form her words.  She also feels that her gait and balance have shown some   improvement.  She has had no new neurological symptoms.  At this point, she is   not having any severe pain.    On brief examination today, she is alert and cooperative and in good spirits.    Speech remains somewhat hesitant and choppy, but is easily understandable.    Extraocular movements are good.  She is moving her extremities well.    MRI of the lumbar spine was repeated at [facility] today and compared to her   previous study of 04/23/18.  There is a small enhancing nodule at the T12-L1   disc space.  This is intradural and extramedullary.  The scan today shows no   change in this lesion.  Size and shape appear about the same.  This is most   likely a neurofibroma.    She has been unable to retrieve her medical records from [facility].  The cerebellar tumor was apparently an astrocytoma, although she did   have chemotherapy and radiation following resection.  She will see if she can   obtain her records.  I will plan MRI of the brain to assess the cerebellar tumor   in about six months.      CELSA/MICHAEL  dd: 11/05/2018 15:14:02 (CST)  td: 11/06/2018 06:55:29 (CST)  Doc ID   #[id number]  Job ID #[id number]    CC: Kadie Mccann

## 2019-03-07 ENCOUNTER — OFFICE VISIT (OUTPATIENT)
Dept: FAMILY MEDICINE | Facility: HOSPITAL | Age: 29
End: 2019-03-07
Attending: FAMILY MEDICINE
Payer: MEDICAID

## 2019-03-07 ENCOUNTER — HOSPITAL ENCOUNTER (OUTPATIENT)
Dept: RADIOLOGY | Facility: HOSPITAL | Age: 29
Discharge: HOME | End: 2019-03-07
Attending: STUDENT IN AN ORGANIZED HEALTH CARE EDUCATION/TRAINING PROGRAM
Payer: MEDICAID

## 2019-03-07 VITALS
HEIGHT: 59 IN | BODY MASS INDEX: 28.45 KG/M2 | WEIGHT: 141.13 LBS | DIASTOLIC BLOOD PRESSURE: 70 MMHG | SYSTOLIC BLOOD PRESSURE: 135 MMHG | HEART RATE: 86 BPM

## 2019-03-07 DIAGNOSIS — M25.562 ACUTE PAIN OF LEFT KNEE: ICD-10-CM

## 2019-03-07 DIAGNOSIS — Q85.01 NEUROFIBROMATOSIS, TYPE 1 (VON RECKLINGHAUSEN'S DISEASE): Primary | ICD-10-CM

## 2019-03-07 DIAGNOSIS — C71.9 ASTROCYTOMA, GRADE II: ICD-10-CM

## 2019-03-07 DIAGNOSIS — Z92.21 S/P CHEMOTHERAPY, TIME SINCE GREATER THAN 12 WEEKS: ICD-10-CM

## 2019-03-07 PROCEDURE — 73552 X-RAY EXAM OF FEMUR 2/>: CPT | Mod: TC,FY,LT

## 2019-03-07 PROCEDURE — 73564 XR KNEE COMP 4 OR MORE VIEWS LEFT: ICD-10-PCS | Mod: 26,LT,, | Performed by: RADIOLOGY

## 2019-03-07 PROCEDURE — 99213 OFFICE O/P EST LOW 20 MIN: CPT | Mod: 25 | Performed by: STUDENT IN AN ORGANIZED HEALTH CARE EDUCATION/TRAINING PROGRAM

## 2019-03-07 PROCEDURE — 73552 XR FEMUR 2 VIEW LEFT: ICD-10-PCS | Mod: 26,LT,, | Performed by: RADIOLOGY

## 2019-03-07 PROCEDURE — 73590 X-RAY EXAM OF LOWER LEG: CPT | Mod: 26,LT,, | Performed by: RADIOLOGY

## 2019-03-07 PROCEDURE — 73564 X-RAY EXAM KNEE 4 OR MORE: CPT | Mod: 26,LT,, | Performed by: RADIOLOGY

## 2019-03-07 PROCEDURE — 73552 X-RAY EXAM OF FEMUR 2/>: CPT | Mod: 26,LT,, | Performed by: RADIOLOGY

## 2019-03-07 PROCEDURE — 73590 X-RAY EXAM OF LOWER LEG: CPT | Mod: TC,FY,LT

## 2019-03-07 PROCEDURE — 73590 XR TIBIA FIBULA 2 VIEW LEFT: ICD-10-PCS | Mod: 26,LT,, | Performed by: RADIOLOGY

## 2019-03-07 PROCEDURE — 73564 X-RAY EXAM KNEE 4 OR MORE: CPT | Mod: TC,FY,LT

## 2019-03-07 NOTE — PROGRESS NOTES
Subjective:       Patient ID: Kadie Mccann is a 28 y.o. female.    Chief Complaint: Fall    HPI     27 yo female w/ hx of neurofibromatosis, astrocytoma s/p chemo & radiation presents after mechanical fall 6 days ago.  Pt slipped on wet floor at the mall and went down hitting her knee.  No hx of twisting it. No swelling.  Pt with 10/10 pain to her posterior knee intermittently.  It is getting better since Friday. She was able to continue with her shopping trip that day.    She does have chronic balance issues due to residual effects from her astrocytoma but this pain is new.  No other falls besides this.  Prior to this she was working out with weights for strength.    Review of Systems   Constitutional: Positive for fatigue. Negative for diaphoresis and fever.   Respiratory: Negative for shortness of breath and wheezing.    Cardiovascular: Negative for chest pain.   Gastrointestinal: Negative for abdominal pain, constipation, diarrhea, nausea and vomiting.   Genitourinary: Negative for dysuria.   Musculoskeletal: Positive for arthralgias and gait problem.   Neurological: Negative for seizures and headaches.        Chronic balance difficulties   Hematological:        Bilat shin bruises   Psychiatric/Behavioral: Negative for sleep disturbance.   All other systems reviewed and are negative.        Objective:      Vitals:    03/07/19 1010   BP: 135/70   Pulse: 86     BMI Readings from Last 3 Encounters:   03/07/19 28.50 kg/m²   11/05/18 30.07 kg/m²   05/07/18 30.78 kg/m²     Physical Exam   Constitutional: She is oriented to person, place, and time. She appears well-developed and well-nourished. No distress.   HENT:   Head: Normocephalic and atraumatic.   Mouth/Throat: No oropharyngeal exudate.   Eyes: Conjunctivae and EOM are normal. Pupils are equal, round, and reactive to light.   Neck: Normal range of motion. Neck supple. No JVD present.   Cardiovascular: Normal rate, regular rhythm and normal heart sounds.    No murmur heard.  Pulmonary/Chest: Effort normal and breath sounds normal. No respiratory distress. She has no wheezes. She has no rales.   Abdominal: Soft. Bowel sounds are normal. She exhibits no distension and no mass. There is no tenderness.   Musculoskeletal: Normal range of motion. She exhibits no edema.   Pain out of proportion to exam at posterior left knee.  Neg anterior drawer, neg lockmans. Could not elicit patellar DTR bilat. Also pain on left knee at medial joint line.  Left knee flexion limited by pain.  Strength 5/5   Neurological: She is alert and oriented to person, place, and time. She displays abnormal reflex. No cranial nerve deficit. Coordination abnormal.   Dysarthric at baseline    Skin: Capillary refill takes less than 2 seconds. She is not diaphoretic.   Psychiatric: She has a normal mood and affect. Her behavior is normal.   Nursing note and vitals reviewed.        Assessment:       1. Neurofibromatosis, type 1 (von Recklinghausen's disease)    2. S/P chemotherapy, time since greater than 12 weeks    3. Astrocytoma, grade II    4. Acute pain of left knee        Plan:       Neurofibromatosis, type 1 (von Recklinghausen's disease)        -    Stable though pt needs to f/u with PCP more frequently    Astrocytoma, grade II, s/p chemo        -    Stable, follows W/ [facility]no q 6 months    Acute pain of left knee  -     X-Ray Tibia Fibula 2 View Left; Future; Expected date: 03/07/2019  -     X-Ray Knee Complete 4 or More Views Left; Future; Expected date: 03/07/2019  -     X-Ray Femur 2 View Left; Future; Expected date: 03/07/2019  -     Recommend RICE therapy, ibuprofen for pain relief.    -     Prescribed topical cream  -     If pain is still equisite, can consider more aggressive imaging      Follow-up in about 1 week (around 3/14/2019).

## 2019-03-14 NOTE — PROGRESS NOTES
I was present in clinic during the visit and assume the primary medical care of this patient.  I discussed the case with Dr. Richardson, and I have reviewed and agree with the assessment and plan.

## 2019-03-21 ENCOUNTER — OFFICE VISIT (OUTPATIENT)
Dept: FAMILY MEDICINE | Facility: HOSPITAL | Age: 29
End: 2019-03-21
Attending: FAMILY MEDICINE
Payer: MEDICAID

## 2019-03-21 VITALS
SYSTOLIC BLOOD PRESSURE: 124 MMHG | HEART RATE: 81 BPM | BODY MASS INDEX: 28.58 KG/M2 | HEIGHT: 59 IN | DIASTOLIC BLOOD PRESSURE: 76 MMHG | WEIGHT: 141.75 LBS

## 2019-03-21 DIAGNOSIS — C71.9 ASTROCYTOMA, GRADE II: ICD-10-CM

## 2019-03-21 DIAGNOSIS — S80.12XD CONTUSION OF LEFT LOWER EXTREMITY, SUBSEQUENT ENCOUNTER: Primary | ICD-10-CM

## 2019-03-21 DIAGNOSIS — Z92.21 S/P CHEMOTHERAPY, TIME SINCE 4-12 WEEKS: ICD-10-CM

## 2019-03-21 DIAGNOSIS — K02.9 DENTAL CARIES: ICD-10-CM

## 2019-03-21 DIAGNOSIS — Q85.01 NEUROFIBROMATOSIS, TYPE 1 (VON RECKLINGHAUSEN'S DISEASE): ICD-10-CM

## 2019-03-21 DIAGNOSIS — M79.2 NEUROPATHIC PAIN: ICD-10-CM

## 2019-03-21 PROBLEM — S80.12XA CONTUSION OF LEFT LEG: Status: ACTIVE | Noted: 2019-03-21

## 2019-03-21 PROCEDURE — 99213 OFFICE O/P EST LOW 20 MIN: CPT | Performed by: STUDENT IN AN ORGANIZED HEALTH CARE EDUCATION/TRAINING PROGRAM

## 2019-03-21 NOTE — PROGRESS NOTES
Subjective:       Patient ID: Kadie Mccann is a 28 y.o. female.    Chief Complaint: Knee pain    HPI     27 yo female w/ hx of neurofibromatosis, astrocytoma s/p chemo & radiation presents after mechanical fall 6 days ago.  Pt slipped [place] wet floor at the mall and went down hitting her knee.  Xrays negative.   Chronic balance difficulties 2/2 astrocytoma    No seizures in the past with neurofibromatosis, but prior stiffness with stress.  Has not been a problem for 2 years.  Attempting to manage any medical problems with tumeric.    Review of Systems   Constitutional: Positive for fatigue. Negative for diaphoresis and fever.   Respiratory: Negative for shortness of breath and wheezing.    Cardiovascular: Positive for chest pain (does not radiate, no diaphoresis, no nausea).   Gastrointestinal: Negative for abdominal pain, constipation, diarrhea, nausea and vomiting.   Genitourinary: Negative for dysuria.   Musculoskeletal: Positive for arthralgias and gait problem.   Neurological: Negative for seizures and headaches.        Chronic balance difficulties   Psychiatric/Behavioral: Negative for sleep disturbance. The patient is nervous/anxious (self-soothes, 86% chocolate).    All other systems reviewed and are negative.        Objective:      Vitals:    03/21/19 0919   BP: 124/76   Pulse: 81     BMI Readings from Last 3 Encounters:   03/21/19 28.63 kg/m²   03/07/19 28.50 kg/m²   11/05/18 30.07 kg/m²     Physical Exam   Constitutional: She is oriented to person, place, and time. She appears well-developed and well-nourished. No distress.   HENT:   Head: Normocephalic and atraumatic.   Mouth/Throat: No oropharyngeal exudate.   Eyes: Conjunctivae and EOM are normal. Pupils are equal, round, and reactive to light.   Neck: Normal range of motion. Neck supple. No JVD present.   Cardiovascular: Normal rate, regular rhythm and normal heart sounds.   No murmur heard.  Pulmonary/Chest: Effort normal and breath sounds normal.  No respiratory distress. She has no wheezes. She has no rales.   Abdominal: Soft. Bowel sounds are normal. She exhibits no distension and no mass. There is no tenderness.   Musculoskeletal: Normal range of motion. She exhibits no edema.   Left Knee non-tender. Strength 5/5 throughout   Neurological: She is alert and oriented to person, place, and time. She displays abnormal reflex. No cranial nerve deficit. Coordination abnormal.   Dysarthric at baseline    Skin: Capillary refill takes less than 2 seconds. She is not diaphoretic.   Psychiatric: She has a normal mood and affect. Her behavior is normal.   Nursing note and vitals reviewed.        Assessment:       1. Contusion of left lower extremity, subsequent encounter    2. Astrocytoma, grade II    3. S/P chemotherapy, time since 4-12 weeks    4. Neurofibromatosis, type 1 (von Recklinghausen's disease)    5. Neuropathic pain    6. Dental caries          Plan:       Left leg contusion        -    Xrays neg, pain resolving.  Not limiting activity    Neurofibromatosis, type 1 (von Recklinghausen's disease)        -    At risk for headaches, seizures, cardiovascular disease        -    Stable    Astrocytoma, grade II, s/p chemo        -    Stable, follows W/ Neurosu[facility] Moreno q 6 months    Dental complication ? caries        -    Being seen today by dental on [place] (referred by [facility]) for tooth extraction    Preventative care   Pap performed   Colonoscopy not due  Flu shot declined 3/2019      Immunization History   Administered Date(s) Administered    Influenza - Quadrivalent - PF 10/12/2017          Follow-up in about 6 months (around 9/21/2019).

## 2019-05-16 ENCOUNTER — OFFICE VISIT (OUTPATIENT)
Dept: FAMILY MEDICINE | Facility: HOSPITAL | Age: 29
End: 2019-05-16
Attending: FAMILY MEDICINE
Payer: MEDICAID

## 2019-05-16 VITALS
HEART RATE: 90 BPM | BODY MASS INDEX: 29.42 KG/M2 | HEIGHT: 59 IN | DIASTOLIC BLOOD PRESSURE: 83 MMHG | SYSTOLIC BLOOD PRESSURE: 130 MMHG | WEIGHT: 145.94 LBS

## 2019-05-16 DIAGNOSIS — M79.605 LEFT LEG PAIN: Primary | ICD-10-CM

## 2019-05-16 DIAGNOSIS — R26.89 BALANCE PROBLEM: ICD-10-CM

## 2019-05-16 DIAGNOSIS — Q85.00 NEUROFIBROMATOSIS: ICD-10-CM

## 2019-05-16 PROCEDURE — 99214 OFFICE O/P EST MOD 30 MIN: CPT | Performed by: STUDENT IN AN ORGANIZED HEALTH CARE EDUCATION/TRAINING PROGRAM

## 2019-05-16 NOTE — PROGRESS NOTES
I assume primary medical responsibility for this patient. I have reviewed the history, physical, and assessement & treatment plan with the resident and agree that the care is reasonable and necessary. This service has been performed by a resident without the presence of a teaching physician under the primary care exception. If necessary, an addendum of additional findings or evaluation beyond the resident documentation will be noted below.    Leg pain likely MSK, given balance difficulties consider repeat course of PT,also referral to PMR for further assitance    Liss Calvillo MD

## 2019-05-16 NOTE — PROGRESS NOTES
Subjective:       Patient ID: Kadie Mccann is a 28 y.o. female.    Chief Complaint: Back Pain    28 y.o. female w/ hx of neurofibromatosis, astrocytoma s/p chemo & radiation presenting with left knee pain after several mechanical falls, most recently two weeks ago. Patient says she fell while walking and hit her knee on the ground. Patient describes pain mostly in the back of her knee extending up to her hamstring, reproduced with flexion of knee.     Patient is also complaining of back pain present for 2 years. She says the pain is constant, worse with activity but does not radiate. Patient has had chronic balance difficulties 2/2 to astrocytoma/chemo and has completed a course of balance PT. Denies numbness, tingling, weakness, SOB, CP, N/V, headache, fever, chills.     HPI     Review of Systems   Constitutional: Negative for diaphoresis and fever.   Respiratory: Negative for shortness of breath and wheezing.    Cardiovascular: Negative for chest pain.   Gastrointestinal: Negative for abdominal pain, constipation, diarrhea, nausea and vomiting.   Genitourinary: Negative for dysuria.   Musculoskeletal: Positive for arthralgias and back pain. Negative for gait problem.        Left knee/hamstring pain   Neurological: Positive for weakness. Negative for seizures and headaches.        Falls   Psychiatric/Behavioral: Negative for sleep disturbance.   All other systems reviewed and are negative.        Objective:      Vitals:    05/16/19 1050   BP: 130/83   Pulse: 90     Body mass index is 29.48 kg/m².    Physical Exam   Constitutional: She is oriented to person, place, and time. She appears well-developed and well-nourished. No distress.   HENT:   Head: Normocephalic and atraumatic.   Mouth/Throat: No oropharyngeal exudate.   Eyes: Pupils are equal, round, and reactive to light. Conjunctivae and EOM are normal.   Neck: Normal range of motion. Neck supple. No JVD present.   Cardiovascular: Normal rate, regular  rhythm and normal heart sounds.   No murmur heard.  Pulmonary/Chest: Effort normal and breath sounds normal. No respiratory distress. She has no wheezes. She has no rales.   Abdominal: Soft. Bowel sounds are normal. She exhibits no distension and no mass. There is no tenderness.   Musculoskeletal: Normal range of motion. She exhibits no edema.   Posterior left leg pain with flexion   Neurological: She is alert and oriented to person, place, and time. No cranial nerve deficit.   Dysarthric at baseline   Skin: Skin is warm. Capillary refill takes less than 2 seconds. She is not diaphoretic.   Multiple cafe-au-lait spots   Psychiatric: She has a normal mood and affect. Her behavior is normal.   Nursing note and vitals reviewed.      Assessment:       1. Left leg pain    2. Neurofibromatosis    3. Balance problem          Plan:       Left leg pain  -     Ambulatory referral to Physical Medicine Rehab  -     Ambulatory Referral to Physical/Occupational Therapy (balance, fall prevention)    Neurofibromatosis  -     Ambulatory referral to Physical Medicine Rehab  -     Ambulatory Referral to Physical/Occupational Therapy    Balance problem  -     Ambulatory referral to Physical Medicine Rehab  -     Ambulatory Referral to Physical/Occupational Therapy       Follow up in about 6 months (around 11/16/2019). or sooner PRN

## 2019-05-30 NOTE — PROGRESS NOTES
I was present in clinic during the visit and assume the primary medical care of this patient.  I discussed the case with Dr. Jean, and I have reviewed and agree with the assessment and plan.

## 2019-06-03 ENCOUNTER — OFFICE VISIT (OUTPATIENT)
Dept: NEUROSURGERY | Facility: CLINIC | Age: 29
End: 2019-06-03
Payer: MEDICAID

## 2019-06-03 ENCOUNTER — HOSPITAL ENCOUNTER (OUTPATIENT)
Dept: RADIOLOGY | Facility: HOSPITAL | Age: 29
Discharge: HOME | End: 2019-06-03
Attending: NEUROLOGICAL SURGERY
Payer: MEDICAID

## 2019-06-03 VITALS
SYSTOLIC BLOOD PRESSURE: 111 MMHG | HEART RATE: 72 BPM | TEMPERATURE: 98 F | BODY MASS INDEX: 29.61 KG/M2 | WEIGHT: 146.63 LBS | DIASTOLIC BLOOD PRESSURE: 63 MMHG

## 2019-06-03 DIAGNOSIS — Q85.01 NEUROFIBROMATOSIS, TYPE 1: ICD-10-CM

## 2019-06-03 DIAGNOSIS — C71.6 CEREBELLAR ASTROCYTOMA: ICD-10-CM

## 2019-06-03 DIAGNOSIS — C71.6 CEREBELLAR ASTROCYTOMA: Primary | ICD-10-CM

## 2019-06-03 DIAGNOSIS — Q85.01 NEUROFIBROMATOSIS, TYPE I (VON RECKLINGHAUSEN'S DISEASE): ICD-10-CM

## 2019-06-03 PROCEDURE — 99214 OFFICE O/P EST MOD 30 MIN: CPT | Mod: PBBFAC,25 | Performed by: NEUROLOGICAL SURGERY

## 2019-06-03 PROCEDURE — 70553 MRI BRAIN W WO CONTRAST: ICD-10-PCS | Mod: 26,,, | Performed by: RADIOLOGY

## 2019-06-03 PROCEDURE — 99213 PR OFFICE/OUTPT VISIT, EST, LEVL III, 20-29 MIN: ICD-10-PCS | Mod: S$PBB,,, | Performed by: NEUROLOGICAL SURGERY

## 2019-06-03 PROCEDURE — 25500020 PHARM REV CODE 255: Performed by: NEUROLOGICAL SURGERY

## 2019-06-03 PROCEDURE — 99213 OFFICE O/P EST LOW 20 MIN: CPT | Mod: S$PBB,,, | Performed by: NEUROLOGICAL SURGERY

## 2019-06-03 PROCEDURE — 99999 PR PBB SHADOW E&M-EST. PATIENT-LVL IV: ICD-10-PCS | Mod: PBBFAC,,, | Performed by: NEUROLOGICAL SURGERY

## 2019-06-03 PROCEDURE — A9585 GADOBUTROL INJECTION: HCPCS | Performed by: NEUROLOGICAL SURGERY

## 2019-06-03 PROCEDURE — 99999 PR PBB SHADOW E&M-EST. PATIENT-LVL IV: CPT | Mod: PBBFAC,,, | Performed by: NEUROLOGICAL SURGERY

## 2019-06-03 PROCEDURE — 70553 MRI BRAIN STEM W/O & W/DYE: CPT | Mod: 26,,, | Performed by: RADIOLOGY

## 2019-06-03 PROCEDURE — 70553 MRI BRAIN STEM W/O & W/DYE: CPT | Mod: TC

## 2019-06-03 RX ORDER — GADOBUTROL 604.72 MG/ML
7 INJECTION INTRAVENOUS
Status: COMPLETED | OUTPATIENT
Start: 2019-06-03 | End: 2019-06-03

## 2019-06-03 RX ADMIN — GADOBUTROL 7 ML: 604.72 INJECTION INTRAVENOUS at 12:06

## 2019-06-03 NOTE — PROGRESS NOTES
This office note has been dictated.  Kadie Mccann returned in neurosurgical followup to the office today.  She   has been working hard at the gym trying to strengthen her arms and legs and   improve her balance.  She is not sure if she is doing all the right things.  She   has had no new headaches or visual symptoms and feels that her speech is also   gradually improving.  She does have some persistent low back pain.    On examination today, she is bright and alert.  Speech is more fluent today,   although still mildly hesitant.  Extraocular movements are good and her pupils   are equal.  She has a somewhat broad-based gait, but did not lose her balance.    She is able to turn in place.    MRI of the brain was repeated at [facility] today and compared to her   previous study of 04/23/18.  Again, is seen the cerebellar tumor in the left   superior cerebellar peduncle.  The tumor has not increased in size and may   actually be a little smaller on today's study, perhaps effects of radiation   therapy.  I see no new lesions.  Ventricular size is stable.  Overall, the scan   is satisfactory.    She requested evaluation at Physical Therapy to help her with her balance and   strengthening.  We will plan one-year followup for the brain tumor.  She has a   known neurofibroma at T12-L1.  I will have MRI of the spine done at that time   also and see her with the studies.      CELSA/MICHAEL  dd: 06/03/2019 14:54:37 (CDT)  td: 06/04/2019 06:09:53 (CDT)  Doc ID   #[id number]  Job ID #[id number]    CC: Kadie Mccann

## 2019-06-13 ENCOUNTER — CLINICAL SUPPORT (OUTPATIENT)
Dept: REHABILITATION | Facility: HOSPITAL | Age: 29
End: 2019-06-13
Payer: MEDICAID

## 2019-06-13 DIAGNOSIS — R26.81 UNSTEADY GAIT: ICD-10-CM

## 2019-06-13 DIAGNOSIS — M54.50 ACUTE MIDLINE LOW BACK PAIN WITHOUT SCIATICA: ICD-10-CM

## 2019-06-13 DIAGNOSIS — Z91.81 RISK FOR FALLS: ICD-10-CM

## 2019-06-13 PROCEDURE — 97110 THERAPEUTIC EXERCISES: CPT | Mod: PN

## 2019-06-13 PROCEDURE — 97162 PT EVAL MOD COMPLEX 30 MIN: CPT | Mod: PN

## 2019-06-13 NOTE — PLAN OF CARE
[facility]  Physical Therapy Neurological Rehabilitation Initial Evaluation    Name: Kadie Mccann  Clinic Number: [id number]    Therapy Diagnosis:   Encounter Diagnoses   Name Primary?   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      Medical History:   Past Medical History:   Diagnosis Date    Fibromatosis        Surgical History:   Kadie Mccann  has a past surgical history that includes leg biopsy; brain biospy (2016); and  section.    Medications:   Kadie CAMERON has a current medication list which includes the following prescription(s): levonorgestrel-ethinyl estradiol.    Allergies:   Review of patient's allergies indicates:   Allergen Reactions    Penicillins         Imaging, knee Xray 3/2019:     FINDINGS:  No acute fractures.  No osseous destruction.  Femoroacetabular, tibiofemoral, patellofemoral and tibiotalar cartilage spaces are grossly preserved.  No joint effusions.  Visualized soft tissues are unremarkable.     Prior Therapy: 2 years ago for balance  Social History:  lives with a aunt and son  Falls: 3 in the last 12 months   DME: Straight cane    Home Environment:  Apt with  stairs  Exercise Routine / History:  Goes to the gym 3-5x/wk   Family Present at time of Eval:  none  Occupation: disabled  Prior Level of Function: indep without AD  Current Level of Function: indep with falls without AD    Pain:  Current 810, worst 810, best 2/10   Location: bilateral back, L knee   Description: Aching  Aggravating Factors: it depends but mostly after a fall  Easing Factors: rest and stretching    Pts goals: to get stronger so I can help people.    Objective     - Command followin%    - Speech: tangential, disorganized though process, slow speech with decrease volume    Mental status: alert, oriented to person, place, and time, positive mood, uphoric behavior, tangential speech, normal dress  Behavior:  calm and cooperative   Attention Span and Concentration:  Normal    Posture Alignment : mildly slouched posture    Sensation:  Light Touch: Intact           Proprioception:   Intact    Tone: 1  Slight increase in muscle tone, manifested by a catch and release or by minimal resistance at the end of the range of motino when the affected part(s) is moved in flexion or extension  Limbs/muscles affected: B LE    Visual/Auditory: denies changes     Coordination:   - fine motor: WFL  - UE coordination: WFL    - LE coordination:  Impaired with mild dysmetria    ROM:   UPPER EXTREMITY--AROM/PROM  (R) UE: WFLs  (L) UE: WFLs           RANGE OF MOTION--LOWER EXTREMITIES  (R) LE Hip: WFL   Knee: WFL   Ankle: WFL    (L) LE: Hip: WFL   Knee: WFL   Ankle: WFL    Upper Extremity Strength  All major ms groups 5/5 in B UEs    Lower Extremity Strength   RLE LLE   Hip Flexion: 5/5 5/5   Hip Extension:  5/5 5/5   Hip Abduction: 5/5 5/5   Knee Extension: 5/5 5/5   Knee Flexion:   5/5 5/5   Ankle Dorsiflexion: 5 5/5   Ankle Plantarflexion: 5 5/5       Gait Assessment:   - AD used: none  - Assistance: none  - Distance: >200 ft  - Stairs: see FGA    Gait Analysis:  Deviations noted: B LE ataxia, narrow IRINEO,  decreased trunk rotation    Impairments contributing to deviations:  Cerebellar infarct    Endurance Deficit: none noted     Balance Assessment:    Sitting balance (static,dynamic):WNL  Standing balance (static, dynamic):see FGA    Functional Mobility (Bed mobility, transfers)  Bed mobility: I  Supine to sit: I  Sit to supine: I  Rolling: I  Transfers to bed: I  Sit to stand:  I  Stand pivot:  I      Functional Gait Assessment:   1. Gait on level surface =  3   (3) Normal: less than 5.5 sec, no A.D., no imbalance, normal gait pattern, deviates< 6in   (2) Mild impairment: 7-5.6 sec, uses A.D., mild gait deviations, or deviates 6-10 in   (1) Moderate impairment: > 7 sec, slow speed, imbalance, deviates 10-15 in.   (0) Severe impairment: needs assist, deviates >15 in, reach/touch wall  2. Change in Gait Speed = 2   (3) Normal: smooth change w/o loss of balance or gait deviation, deviates < 6 in, significant difference between speeds   (2) Mild impairment: changes speed, but demonstrates mild gait deviations, deviates 6-10 in, OR no deviations but unable to significantly speed, OR uses A.D.   (1) Moderate impairment: minor changes to speed, OR changes speed w/ significant deviations, deviates 10-15 in, OR  Changes speed , but loses balance & recovers   (0) Severe impairment: cannot change speed, deviates >15 in, or loses balance & needs assist  3. Gait with horizontal head turns  = 3   (3) Normal: no change in gait, deviates <6 in   (2) Mild impairment: slight change in speed, deviates 6-10 in, OR uses A.D.   (1) Moderate impairment: moderate change in speed, deviates 10-15 in   (0) Severe impairment: severe disruption of gait, deviates >15in  4. Gait with vertical head turns = 3   (3) Normal: no change in gait, deviates <6 in   (2) Mild impairment: slight change in speed, deviates 6-10 in OR uses A.D.   (1) Moderate impairment: moderate change in speed, deviates 10-15 in   (0) Severe impairment: severe disruption of  gait, deviates >15 in  5. Gait with pivot turns = 3   (3) Normal: performs safely in 3 sec, no LOB   (2) Mild impairment: performs in >3 sec & no LOB, OR turns safely & requires several steps to regain LOB   (1) Moderate impairment: turns slow, OR requires several small steps for balance following turn & stop   (0) Severe impairment: cannot turn safely, needs assist  6. Step over obstacle = 3   (3) Normal: steps over 2 stacked boxes w/o change in speed or LOB   (2) Mild impairment: able to step over 1 box w/o change in speed or LOB   (1) Moderate impairment: steps over 1 box but must slow down, may require VC   (0) Severe impairment: cannot perform w/o assist  7. Gait with Narrow IRINEO = 2   (3) Normal: 10 steps no staggering   (2) Mild impairment: 7-9 steps   (1) Moderate impairment: 4-7 steps   (0) Severe impairment: < 4 steps or cannot perform w/o assist  8. Gait with eyes closed = 1   (3) Normal: < 7 sec, no A.D., no LOB, normal gait pattern, deviates <6 in   (2) Mild impairment: 7.1-9 sec, mild gait deviations, deviates 6-10 in   (1) Moderate impairment: > 9 sec, abnormal pattern, LOB, deviates 10-15 in   (0) Severe impairment: cannot perform w/o assist, LOB, deviates >15in  9. Ambulating Backwards = 2   (3) Normal: no A.D., no LOB, normal gait pattern, deviates <6in   (2) Mild impairment: uses A.D., slower speed, mild gait deviations, deviates 6-10 in   (1) Moderate impairment: slow speed, abnormal gait pattern, LOB, deviates 10-15 in   (0) Severe impairment: severe gait deviations or LOB, deviates >15in  10. Steps = 2   (3) Normal: alternating feet, no rail   (2) Mild Impairment: alternating feet, uses rail   (1) Moderate impairment: step-to, uses rail   (0) Severe impairment: cannot perform safely    Score 24/30     Score:   <22/30 fall risk       CMS Impairment/Limitation/Restriction for FOTO unspecified complication Survey    Therapist reviewed FOTO scores for Kadie Mccann on 6/13/2019.   FOTO  documents entered into EPIC - see Media section.    Limitation Score: 51%  Category: Mobility       TREATMENT   Treatment Time In: 1600  Treatment Time Out: 1615  Total Treatment time separate from Evaluation: 15 minutes    Kadie received therapeutic exercises to develop strength, endurance, ROM, flexibility, posture and core stabilization for 15 minutes including:  Lateral trunk rotations    10'' x 3' total B  Piriformis figure 4 stretch   3 x 30'' B  Hamstring stretch    3 x 30'' B  Single knee to chest    10'' x 10 B    Pt received MHP x 15' during TE.    Home Exercises and Patient Education Provided    Education provided:   - do HEP stretches daily and continue gym exercise as tolerated    Written Home Exercises Provided: yes.  Exercises were reviewed and Kadie was able to demonstrate them prior to the end of the session.  Kadie demonstrated good  understanding of the education provided.     See EMR under Media for exercises provided 6/13/2019.    Assessment   Kadie CAMERON is a 28 y.o. female referred to outpatient Physical Therapy with a medical diagnosis of cerebellar tumor. Pt presents with ataxia and pain after fall.  She goes to the gym and exercise but she continues to have pain after some exercise session.      Pt prognosis is Good.   Pt will benefit from skilled outpatient Physical Therapy to address the deficits stated above and in the chart below, provide pt/family education, and to maximize pt's level of independence.     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   Q85.01 (ICD-10-CM) -  Neurofibromatosis, type 1     Personal Factors:   age  lifestyle     moderate   Examination  Body Structures and Functions, activity limitations and participation restrictions that may impact the plan of care Body Regions:   lower extremities  upper extremities  trunk  back  Body Systems:    gross symmetry  ROM  strength  gross coordinated movement  balance  gait  transfers  transitions  motor control  motor learning    Participation Restrictions:   Progressing at gym without pain, walking down stairs without carrying something    Activity limitations:   Learning and applying knowledge  no deficits    General Tasks and Commands  undertaking multiple tasks    Communication  communicating with/receiving spoken language    Mobility  lifting and carrying objects  walking    Self care  no deficits    Domestic Life  doing house work (cleaning house, washing dishes, laundry)  assisting others    Interactions/Relationships  basic interpersonal interactions    Life Areas  higher education  employment    Community and Social Life  community life  recreation and leisure         moderate   Clinical Presentation evolving clinical presentation with changing clinical characteristics moderate   Decision Making/ Complexity Score: moderate     Goals:  Short Term Goals: 3 weeks   1. Pt will be indep with initial HEP.  2. Pt will have less back pain at the gym <4/10 pain in back after workout.    Long Term Goals: 6 weeks   1. Pt will have less back pain at the gym <2/10 in back after workout.  2. Patient will be able to achieve greater than or equal to 25/30 on the Dynamic Gait Index decreasing patient's risk for falling and improving balance.   3. Pt will have 0 fall will in therapy to reduce risk of falls with injury.  4. Pt will be able to walk up the stairs with 1 UE support while caring a 10# bag without LOB for improved stability in stairs and reduced fall risk.     Plan   Plan of care Certification: 6/13/2019 to  7/28/19.    Outpatient Physical Therapy 1 times weekly for 6 weeks to include the following interventions: Gait Training, Manual Therapy, Moist Heat/ Ice, Neuromuscular Re-ed, Patient Education, Self Care, Therapeutic Activites and Therapeutic Exercise.     Mila Beard, PT

## 2019-06-17 ENCOUNTER — TELEPHONE (OUTPATIENT)
Dept: NEUROSURGERY | Facility: CLINIC | Age: 29
End: 2019-06-17

## 2019-06-17 NOTE — TELEPHONE ENCOUNTER
SW PT, SHE HAS STARTED WORKING OUT AND HAS NOTICED SOME UNUSUAL MUSCLE DEVELIPMENT. CONCERNED WITH HER NEUROFIBROMATOSIS DX. WILL SEND A PIX AND I CAN REVIEW WITH DR SANTIAGO AND GET BACK TO HER.

## 2019-06-17 NOTE — TELEPHONE ENCOUNTER
----- Message from Dion Cardona sent at 6/17/2019  1:07 PM CDT -----  Contact: Pt. [phone]  Needs Advice    Reason for call: The patient would like to speak to someone regarding something that appeared on her right leg. She's concerned that it has something to do with her condition. Please contact the patient to discuss further.          Communication Preference:PHONE     Additional Information:

## 2019-06-18 PROBLEM — M54.50 LOW BACK PAIN: Status: ACTIVE | Noted: 2019-06-18

## 2019-06-18 PROBLEM — Z91.81 RISK FOR FALLS: Status: ACTIVE | Noted: 2019-06-18

## 2019-06-18 PROBLEM — R26.81 UNSTEADY GAIT: Status: ACTIVE | Noted: 2019-06-18

## 2019-06-25 DIAGNOSIS — Z30.41 ORAL CONTRACEPTIVE USE: ICD-10-CM

## 2019-06-25 RX ORDER — LEVONORGESTREL AND ETHINYL ESTRADIOL 6-5-10
1 KIT ORAL DAILY
Qty: 28 TABLET | Refills: 11 | Status: SHIPPED | OUTPATIENT
Start: 2019-06-25 | End: 2020-07-07 | Stop reason: SDUPTHER

## 2019-06-25 NOTE — TELEPHONE ENCOUNTER
----- Message from Mariaelena Holland sent at 6/25/2019  1:20 PM CDT -----  Pt needs a refill on levonorgestrel-ethinyl estradiol (TRIVORA, 28,) 50-30 (6)/75-40 (5)/125-30(10) per tablet

## 2019-06-27 ENCOUNTER — CLINICAL SUPPORT (OUTPATIENT)
Dept: REHABILITATION | Facility: HOSPITAL | Age: 29
End: 2019-06-27
Payer: MEDICAID

## 2019-06-27 DIAGNOSIS — R26.81 UNSTEADY GAIT: ICD-10-CM

## 2019-06-27 DIAGNOSIS — M54.50 ACUTE MIDLINE LOW BACK PAIN WITHOUT SCIATICA: ICD-10-CM

## 2019-06-27 DIAGNOSIS — Z91.81 RISK FOR FALLS: ICD-10-CM

## 2019-06-27 PROCEDURE — 97110 THERAPEUTIC EXERCISES: CPT | Mod: PN

## 2019-06-27 NOTE — PROGRESS NOTES
"  Physical Therapy Daily Treatment Note     Name: Kadie Mccann  Clinic Number: [id number]    Therapy Diagnosis: No diagnosis found.  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   She will be compliant with home exercise program.  Response to previous treatment: eval last session  Functional change: none reported by pt.     Pain: 5/10  Location: bilateral back      Objective     Kadie received therapeutic exercises to develop strength, endurance, ROM and flexibility for 40 minutes including:    -Prone ham curls  1.5 2x10 B  -prone hip ext alt LE  5x2 w/ manual instructions to prevent over extension  -Gluteal sets   Prone 5"x15  -abdominal bracing  hookling w/ beach ball 5 sec x15  -ham curls in prone  2x15 B  -Manual hip flex st  20 sec x 3 in alt sidelying  -HSS    20 sec x 3 B  -Piriformis st   20 sec x 3 B    Ended session on moist heat to full back x 10 min for lower back pain in hooklying.     Home Exercises Provided and Patient Education Provided     Education provided:   - cont gym within tolerance  - reviewed about proper recovery time between sets to prevent mm over use  - reviewed safety and technique with supine activities.     Written Home Exercises Provided: Patient instructed to cont prior HEP.  Exercises were reviewed and Kadie was able to demonstrate them prior to the end of the session.  Kadie demonstrated good  understanding of the education provided.     See EMR under Patient Instructions for " exercises provided prior visit.    Assessment     Pt tolerated session with no increased pain in lower back today. Session focused on proper technique and safety with completion on core activities. Pt is cooperative and eager to perform activities however she demonstrated exercises that she performs on her own at gym that may be contributing to her lower back pain.     Kadie is progressing well towards her goals.   Pt prognosis is Good.     Pt will continue to benefit from skilled outpatient physical therapy to address the deficits listed in the problem list box on initial evaluation, provide pt/family education and to maximize pt's level of independence in the home and community environment.     Pt's spiritual, cultural and educational needs considered and pt agreeable to plan of care and goals.     Anticipated barriers to physical therapy:  See precautions      Goals:  Short Term Goals: 3 weeks   1. Pt will be indep with initial HEP. ONGOING, NOT MET  2. Pt will have less back pain at the gym <4/10 pain in back after workout. ONGOING, NOT MET     Long Term Goals: 6 weeks   1. Pt will have less back pain at the gym <2/10 in back after workout. ONGOING, NOT MET  2. Patient will be able to achieve greater than or equal to 25/30 on the Dynamic Gait Index decreasing patient's risk for falling and improving balance.  ONGOING, NOT MET  3. Pt will have 0 fall will in therapy to reduce risk of falls with injury. ONGOING, NOT MET  4. Pt will be able to walk up the stairs with 1 UE support while caring a 10# bag without LOB for improved stability in stairs and reduced fall risk.  ONGOING, NOT MET     Plan     Cont to advance PT as per POC  Review SAFETY and Proper technique with GYM activities.     Andrea Monsivais, PTA

## 2019-07-02 ENCOUNTER — CLINICAL SUPPORT (OUTPATIENT)
Dept: REHABILITATION | Facility: HOSPITAL | Age: 29
End: 2019-07-02
Attending: NEUROLOGICAL SURGERY
Payer: MEDICAID

## 2019-07-02 DIAGNOSIS — M54.50 ACUTE MIDLINE LOW BACK PAIN WITHOUT SCIATICA: ICD-10-CM

## 2019-07-02 DIAGNOSIS — R26.81 UNSTEADY GAIT: ICD-10-CM

## 2019-07-02 DIAGNOSIS — Z91.81 RISK FOR FALLS: ICD-10-CM

## 2019-07-02 PROCEDURE — 97110 THERAPEUTIC EXERCISES: CPT | Mod: PN

## 2019-07-02 NOTE — PROGRESS NOTES
"  Physical Therapy Daily Treatment Note     Name: Kadie Mccann  Clinic Number: [id number]    Therapy Diagnosis:   Encounter Diagnoses   Name Primary?   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   She will be compliant with home exercise program.  Response to previous treatment: eval last session  Functional change: none reported by pt.     Pain: 5/10  Location: bilateral back      Objective     Kadie received therapeutic exercises to develop strength, endurance, ROM and flexibility for 40 minutes including:    -Prone ham curls  1.5 2x10 B  -prone hip ext alt LE  5x2 w/ manual instructions to prevent over extension  -Gluteal sets   Prone 5"x15  -abdominal bracing  hookling 5 x 20''   - seated marches  3 x 10 B  -Manual hip flex st  20 sec x 3 in alt sidelying  -HSS    20 sec x 3 B  -Piriformis st   20 sec x 3 B    Ended session on moist heat to full back x 10 min for lower back pain in hooklying.     Home Exercises Provided and Patient Education Provided     Education provided:   - cont gym within tolerance  - reviewed about proper recovery time between sets to prevent mm over use  - reviewed safety and technique with supine activities.     Written Home Exercises Provided: Patient instructed to cont prior HEP.  Exercises were reviewed and Kadie was able to demonstrate them prior to the end of the session.  Kadie demonstrated " good  understanding of the education provided.     See EMR under Patient Instructions for exercises provided prior visit.    Assessment     Pt tolerated session with no increased pain in lower back today.  She demonstrated a TA with good technique for proper core initiation vivienne Engle is progressing well towards her goals.   Pt prognosis is Good.     Pt will continue to benefit from skilled outpatient physical therapy to address the deficits listed in the problem list box on initial evaluation, provide pt/family education and to maximize pt's level of independence in the home and community environment.     Pt's spiritual, cultural and educational needs considered and pt agreeable to plan of care and goals.     Anticipated barriers to physical therapy:  See precautions      Goals:  Short Term Goals: 3 weeks   1. Pt will be indep with initial HEP. ONGOING, NOT MET  2. Pt will have less back pain at the gym <4/10 pain in back after workout. ONGOING, NOT MET     Long Term Goals: 6 weeks   1. Pt will have less back pain at the gym <2/10 in back after workout. ONGOING, NOT MET  2. Patient will be able to achieve greater than or equal to 25/30 on the Dynamic Gait Index decreasing patient's risk for falling and improving balance.  ONGOING, NOT MET  3. Pt will have 0 fall will in therapy to reduce risk of falls with injury. ONGOING, NOT MET  4. Pt will be able to walk up the stairs with 1 UE support while caring a 10# bag without LOB for improved stability in stairs and reduced fall risk.  ONGOING, NOT MET     Plan     Cont to advance PT as per POC  Review SAFETY and Proper technique with GYM activities.     Mila Beard, PT

## 2019-07-09 ENCOUNTER — TELEPHONE (OUTPATIENT)
Dept: NEUROSURGERY | Facility: CLINIC | Age: 29
End: 2019-07-09

## 2019-07-09 DIAGNOSIS — R47.82 FLUENCY DISORDER ASSOCIATED WITH UNDERLYING DISEASE: ICD-10-CM

## 2019-07-09 DIAGNOSIS — Q85.00 NF (NEUROFIBROMATOSIS): ICD-10-CM

## 2019-07-09 DIAGNOSIS — C71.6 CEREBELLAR ASTROCYTOMA: Primary | ICD-10-CM

## 2019-07-09 DIAGNOSIS — F80.0 SPEECH ARTICULATION DISORDER: ICD-10-CM

## 2019-07-11 ENCOUNTER — CLINICAL SUPPORT (OUTPATIENT)
Dept: REHABILITATION | Facility: HOSPITAL | Age: 29
End: 2019-07-11
Attending: NEUROLOGICAL SURGERY
Payer: MEDICAID

## 2019-07-11 DIAGNOSIS — M54.50 ACUTE MIDLINE LOW BACK PAIN WITHOUT SCIATICA: ICD-10-CM

## 2019-07-11 DIAGNOSIS — Z91.81 RISK FOR FALLS: ICD-10-CM

## 2019-07-11 DIAGNOSIS — R26.81 UNSTEADY GAIT: ICD-10-CM

## 2019-07-11 DIAGNOSIS — R47.1 DYSARTHRIA: ICD-10-CM

## 2019-07-11 PROCEDURE — 97110 THERAPEUTIC EXERCISES: CPT | Mod: PN

## 2019-07-11 PROCEDURE — 92522 EVALUATE SPEECH PRODUCTION: CPT | Mod: PN

## 2019-07-11 NOTE — PROGRESS NOTES
"  Physical Therapy Daily Treatment Note     Name: Kadie Mccann  Clinic Number: [id number]    Therapy Diagnosis:   Encounter Diagnoses   Name Primary?   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 Pt agreeable to PT session.   She will be compliant with home exercise program.  Response to previous treatment: no pain   Functional change: decreased pain in lower back    Pain: 3/10  Location: bilateral back      Objective     Kadie received therapeutic exercises to develop strength, endurance, ROM and flexibility for 40 minutes including:    -Prone ham curls  2.0 #  2x10 B  -prone hip ext alt LE  5x2 w/ manual instructions to prevent over extension  -Gluteal sets   Prone 5"x15  -abdominal bracing  hookling 5 x 20''   - seated marches  3 x 10 B  -Manual hip flex st  20 sec x 3 in alt sidelying  -HSS    20 sec x 3 B  -Piriformis st   20 sec x 3 B    Ended session on moist heat to full back x 10 min for lower back pain in hooklying.     Home Exercises Provided and Patient Education Provided     Education provided:   - cont gym within tolerance and safety  - reviewed about proper recovery time between sets to prevent mm over use  - reviewed safety and technique with supine activities.   - pt was advised by primary PT to seek medical assistance with incontinence.   Written Home " Exercises Provided: Patient instructed to cont prior HEP.  Exercises were reviewed and Kadie was able to demonstrate them prior to the end of the session.  Kadie demonstrated good  understanding of the education provided.     See EMR under Patient Instructions for exercises provided prior visit.    Assessment     Pt completed treatment with no adverse reactions. She was able to perform effective TA activation w/ proper breathing technique.     Kadie is progressing well towards her goals.   Pt prognosis is Good.     Pt will continue to benefit from skilled outpatient physical therapy to address the deficits listed in the problem list box on initial evaluation, provide pt/family education and to maximize pt's level of independence in the home and community environment.     Pt's spiritual, cultural and educational needs considered and pt agreeable to plan of care and goals.     Anticipated barriers to physical therapy:  See precautions      Goals:  Short Term Goals: 3 weeks   1. Pt will be indep with initial HEP. ONGOING, NOT MET  2. Pt will have less back pain at the gym <4/10 pain in back after workout. ONGOING, NOT MET     Long Term Goals: 6 weeks   1. Pt will have less back pain at the gym <2/10 in back after workout. ONGOING, NOT MET  2. Patient will be able to achieve greater than or equal to 25/30 on the Dynamic Gait Index decreasing patient's risk for falling and improving balance.  ONGOING, NOT MET  3. Pt will have 0 fall will in therapy to reduce risk of falls with injury. ONGOING, NOT MET  4. Pt will be able to walk up the stairs with 1 UE support while caring a 10# bag without LOB for improved stability in stairs and reduced fall risk.  ONGOING, NOT MET     Plan     Cont to advance PT as per RAMAN Monsivais PTA

## 2019-07-11 NOTE — PLAN OF CARE
Outpatient Neurological Rehabilitation  Speech and Language Therapy Evaluation    Date: 2019     Name: Kadie Mccann   MRN: [id number]    Therapy Diagnosis:   Encounter Diagnosis   Name Primary?   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   Evaluation of Speech Sound Production  45 minutes         Precautions:Fall  Subjective   Date of Onset: 19  History of Current Condition:   Pt reports difficulty with speech fluency, rate of speech, having a conversation, and writing. History of astrocytoma with chemotherapy, as well as focal seizures.   Past Medical History: Kadie Mccann  has a past medical history of Fibromatosis.  Kadie Mccann  has a past surgical history that includes leg biopsy; brain biospy (2016); and  section.  Medical Hx and Allergies: Kadie CAMERON has a current medication list which includes the following prescription(s): levonorgestrel-ethinyl estradiol.   Review of patient's allergies indicates:   Allergen Reactions    Penicillins      Prior Therapy:  Previous speech therapy Oct 2017 to 2018  Social History: Pt lives with dad, aunt, uncle, cousin, and son. Not currently working. Previously working at [employer] and going to school in 2016.  Looking to get GED.   Prior Level of Function:  Normal rate of speech, fluency, stress, and prosody  Current Level of Function: dysarthria with impaired rate of speech, fluency,  stress, prosody  Pain:  2 /10  Pain Location / Description: left side cheek/teeth numbness  Nutrition:  Regular and thin liquids  Patient's Therapy Goals:  To improve speech in conversation, rate of speech, writing skills.   Objective   Formal Assessment:    Oral Motor Exam:    Mandibular Strength and Mobility - Trigeminal Nerve (CNV) Rest: Symmetrical    Lateralization: Not observed  Protrusion: Not observed  Retraction:  Not observed  Involuntary Movement: absent    Oral Labial Strength and Mobility -Facial Nerve (CN VII)  Rest: symmetrical   Lateralization: WFL  Protrusion: WFL  Retraction:  WFL  Involuntary Movement: absent    Lingual Strength and Mobility- Hypoglossal Nerve (CN XII)  Rest: No deviation observed    Lateralization: Decreased ROM and slowed movements  Protrusion: WFL  Retraction:  WFL  Involuntary Movement: absent    Velar Elevation - Glossopharyngeal Nerve (CN IX) and Vagus (CN X) Rest: no deviation observed   Involuntary Movement: absent    Buccal Strength and Mobility - Facial Nerve (CN VII)  WFL    Facial Sensation and Movement - Facial Nerve (CN VII) Symmetrical at rest: yes     Structure Abnormalities: no  Dentition: present and adequate      Respiration / Phonation:   # of reps/ 5s Norms/ # reps per second Maximum Phon. Time (ah) Words Per Minute:   P^ 11 5.0-7.1 2.2 Trial 1: 20 sec.     95   /  1  = 95  #words/  Minute in conversation   T^ 12  4.8-7.1 2.4 Trial 2: n/a >125 = WFL    K^ 10 4.4-7.4 2 Trial 3: n/a <125 = refer for AAC eval   P^T^K^ 8 3.6-7.5 1.6 Av sec. Norm: 160-170  (paragraph reading)       Norm (F 10-26, M 13-34.6) Norm: 150 to 250 (norm conversation)        Phonation Oral Reading Conversation   Stimulus Sustained ah:    Singing up scale:    Counting 1 to highest # on one breath: The [employer]father Passage  Increased breaths required  History and Conversation   Quality weak, dysarthria weak, dysarthria weak, dysarthria   Duration  20 seconds  1 min. 56 seconds  1  "min.    Loudness   variable Loudness  variable Loudness  variable Loudness   Steadiness Vocal tremor and varied pitch  vocal tremor and varied pitch vocal tremor and varied pitch     Overall Speech Intelligibility: fair to good     Speech Intelligibility:   The Frenchay Dysarthria Assessment-2nd Edition was administered to Kadie  to assess her motor speech skills. This test assesses the patient's reflexes, respiration, lips, palate, laryngeal function, tongue, and overall intelligibility. Results are described in the following tables:    Reflexes Respiration Lips Palate Laryngeal Tongue Intelligibility   Normal for Age       X     X                  Mild Abnormality       X               X       X    Abnormality obvious but can perform task with reasonable approximation          X             X   Some production of task poor in quality, unable to sustain, or extremely labored          Unable to undertake task/ movement/ sound            Description: Pt reported occasional coughing post-swallow of thin liquids and feeling the material "go down the wrong way." Pt reported 1-2 times/week for frequency and attributed this to taking in the thin liquid at a rapid rate. Increased mastication timed of cracker observed and reported by pt. Increased time to prepare and swallow bolus during swallow reflex task. Pt reported increased drooling in the past with result of a raised discoloration on the right corner of the lips. This drooling occurred over night and not while eating or drinking. Pt reported that this problem has ceased, with the exception of pooling occurring on the left side of the oral cavity while speaking for increased periods of time.     Respiration task of audible exhalation diminished. Pt's exhalation shallow and not audible. Frequent breaks in fluency due to poor respiratory control. Voice fades and pt requires significant breaths to complete speaking tasks. Lip seal, strength, and ROM deemed WFL. Pt's " "palatal function deemed WFL. Pt showed no signs of difficulty for lip and palate tasks during the assessment.     Pt's sustained phonation of /ah/ for 20 seconds falls within normal limits for adult females, however, the vocal quality of the sustained phoneme was deemed weak, characterized by pitch and phonation breaks. Pt's pitch range limited evidenced by uneven progression during pitch scale and inability to represent 4-6 distinct pitch changes. Minimal difficulty detected in volume control. Pt able to adjust volume in increasing loudness task; however, loudest volume diminished as a result of poor breath support. Intonation during speech tasks observed to be mostly effective, but occasionally displays difficulties with modulation, clarity of phonation, and pitch variation.     Tongue at rest observed to be WFL. Minimal tongue weakness observed c/b diminished ROM and untimely movements. Intelligibility noted for 9 out of 10 words and 8 out of 10 sentences. In conversation, speech abnormal but intelligible. Pt occasionally had to repeat herself. Frequent use of fillers noted, including "you know," and "um."     Awareness / Strategy Use:   Pt demonstrates adequate awareness of motor speech impairment. Pt speaks slow       Treatment   Treatment Time In: n/a  Treatment Time Out: n/a  Total Treatment Time: n/a  no treatment performed 2/2 time to complete evaluation.    Education: Plan of Care, role of SLP in care, course of medical disease affect on therapy diagnosis , scheduling/ cancellation policy and insurance limitations / visit limit  were discussed with pt. Patient expressed understanding.     Assessment     Kadie presents to [facility] s/p medical diagnosis of  moderate dysarthria.  Demonstrates impairments including limitations as described in the problem list.She presents with mild dysarthria c/b impaired rate of speech, fluency, prosody, pitch range, breath support, pitch and " phonatory breaks, volume. Positive prognostic factors include age and transportation. Negative prognostic factors include onset.No barriers to therapy identified. Patient will benefit from skilled, outpatient neurological rehabilitation speech therapy.    Rehab Potential: fair  Pt's spiritual, cultural and educational needs considered and pt agreeable to plan of care and goals.    Short Term Goals (4 weeks):   1. Pt will recall and utilize speech intelligibility strategies with 90% acc given min A to improve rate of speech and fluency in conversation.  2. Pt will complete stress drills with 90% acc given min A to improve prosody in conversation.   3. Pt will complete breath support exercises to improve fluency in conversation with 90% acc given min A.   4. Pt will complete breath grouping into syntactic units with 90% acc given min A to improve fluency in conversation.   5. Pt will participate in topic maintenance tasks with 80% acc given min A to improve conversational appropriateness.   6. Pt will demonstrate less than 5 tangents in a 45 minute session given min A to improve ability to stay on topic.     Long Term Goals (8 weeks):   1. Pt will develop functional and intelligible speech and utilize compensatory strategies through the use of adequate labial and lingual function, increased articulatory precision and speech prosody.     Plan     Plan of Care Certification Period: 7/11/2019  to 9/5/19    Recommended Treatment Plan:  Patient will participate in the [facility] rehabilitation program for speech therapy 2 times per week for 8 weeks to address her  Motor Speech deficits, to educate patient and their family, and to participate in a home exercise program.    Other Recommendations: none at this time    Therapist's Name:   Christel Aguayo CCC-SLP   7/11/2019

## 2019-07-15 ENCOUNTER — CLINICAL SUPPORT (OUTPATIENT)
Dept: REHABILITATION | Facility: HOSPITAL | Age: 29
End: 2019-07-15
Attending: NEUROLOGICAL SURGERY
Payer: MEDICAID

## 2019-07-15 DIAGNOSIS — Z91.81 RISK FOR FALLS: ICD-10-CM

## 2019-07-15 DIAGNOSIS — M54.50 ACUTE MIDLINE LOW BACK PAIN WITHOUT SCIATICA: ICD-10-CM

## 2019-07-15 DIAGNOSIS — R26.81 UNSTEADY GAIT: ICD-10-CM

## 2019-07-15 PROCEDURE — 97110 THERAPEUTIC EXERCISES: CPT | Mod: PN

## 2019-07-15 NOTE — PROGRESS NOTES
"  Physical Therapy Daily Treatment Note     Name: Kadie Mccann  Clinic Number: [id number]    Therapy Diagnosis:   Encounter Diagnoses   Name Primary?   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   She will be compliant with home exercise program.  Response to previous treatment: no pain   Functional change: decreased pain in lower back    Pain: 2/10  Location: bilateral back  / B hamstrings    Objective     Kadie received therapeutic exercises to develop strength, endurance, ROM and flexibility for 45 minutes including:    -Prone ham curls  2.0 #  2x10 B  -prone hip ext alt LE  5x2 w/ manual instructions to prevent over extension  -Gluteal sets   Prone 5"x15  -abdominal bracing  hookling 5 x 20''   - seated marches  3 x 10 B  -Manual hip flex st  20 sec x 3 in alt sidelying  -HSS    20 sec x 3 B  -Piriformis st   20 sec x 3 B  -Sit to stand    Low surface seat 3x10 B no UE assist  Nustep x 10 min level 4 hills program for B UE/ LE reciprocal AROM. With no rest breaks.     Home Exercises Provided and Patient Education Provided     Education provided:   - cont gym within tolerance and safety  - reviewed about proper recovery time between sets to prevent mm over use    Written Home Exercises Provided: Patient instructed to cont prior HEP.  Exercises were reviewed and Kadie was able to demonstrate them prior to the end of the session.  " Kadie demonstrated good  understanding of the education provided.     See EMR under Patient Instructions for exercises provided prior visit.    Assessment     Pt completed treatment with no adverse reactions. She tolerated additional therrex with no reports of pain.     Kadie is progressing well towards her goals.   Pt prognosis is Good.     Pt will continue to benefit from skilled outpatient physical therapy to address the deficits listed in the problem list box on initial evaluation, provide pt/family education and to maximize pt's level of independence in the home and community environment.     Pt's spiritual, cultural and educational needs considered and pt agreeable to plan of care and goals.     Anticipated barriers to physical therapy:  See precautions      Goals:  Short Term Goals: 3 weeks   1. Pt will be indep with initial HEP. ONGOING, NOT MET  2. Pt will have less back pain at the gym <4/10 pain in back after workout. ONGOING, NOT MET     Long Term Goals: 6 weeks   1. Pt will have less back pain at the gym <2/10 in back after workout. ONGOING, NOT MET  2. Patient will be able to achieve greater than or equal to 25/30 on the Dynamic Gait Index decreasing patient's risk for falling and improving balance.  ONGOING, NOT MET  3. Pt will have 0 fall will in therapy to reduce risk of falls with injury. ONGOING, NOT MET  4. Pt will be able to walk up the stairs with 1 UE support while caring a 10# bag without LOB for improved stability in stairs and reduced fall risk.  ONGOING, NOT MET     Plan     Cont to advance PT as per POC      Andrea Monsivais, RHONDA

## 2019-08-27 ENCOUNTER — DOCUMENTATION ONLY (OUTPATIENT)
Dept: REHABILITATION | Facility: HOSPITAL | Age: 29
End: 2019-08-27

## 2019-08-27 NOTE — PROGRESS NOTES
Face to Face PTA Conference performed with Andrea Monsivais PTA regarding patient's current status, overall progress, and plan of care.    Mila Beard, PT    DPT  Andrea Monsivais, PTA

## 2019-10-16 ENCOUNTER — DOCUMENTATION ONLY (OUTPATIENT)
Dept: REHABILITATION | Facility: HOSPITAL | Age: 29
End: 2019-10-16

## 2019-10-16 DIAGNOSIS — Z91.81 RISK FOR FALLS: ICD-10-CM

## 2019-10-16 DIAGNOSIS — M54.50 ACUTE MIDLINE LOW BACK PAIN WITHOUT SCIATICA: ICD-10-CM

## 2019-10-16 DIAGNOSIS — R26.81 UNSTEADY GAIT: ICD-10-CM

## 2019-10-16 NOTE — PROGRESS NOTES
Outpatient Therapy Discharge Summary     Name: Kadie Mccann  Clinic Number: [id number]    Therapy Diagnosis:   Encounter Diagnoses   Name Primary?    Risk for falls     Acute midline low back pain without sciatica     Unsteady gait      Physician: No ref. 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 Pt will be indep with initial HEP. ONGOING, NOT MET  2. Pt will have less back pain at the gym <4/10 pain in back after workout. ONGOING, NOT MET     Long Term Goals: 6 weeks   1. Pt will have less back pain at the gym <2/10 in back after workout. ONGOING, NOT MET  2. Patient will be able to achieve greater than or equal to 25/30 on the Dynamic Gait Index decreasing patient's risk for falling and improving balance.  ONGOING, NOT MET  3. Pt will have 0 fall will in therapy to reduce risk of falls with injury. ONGOING, NOT MET  4. Pt will be able to walk up the stairs with 1 UE support while caring a 10# bag without LOB for improved stability in stairs and reduced fall risk.  ONGOING, NOT MET    Discharge reason: Other:  Transportation complications    Plan   This patient is discharged from Physical Therapy

## 2019-10-25 ENCOUNTER — TELEPHONE (OUTPATIENT)
Dept: NEUROSURGERY | Facility: CLINIC | Age: 29
End: 2019-10-25

## 2019-10-25 NOTE — TELEPHONE ENCOUNTER
----- Message from Mary Carmen Stoll sent at 10/25/2019 11:47 AM CDT -----  Contact: Self [phone]  Pt states she would like to speak with nurse no further details given please call back to discuss

## 2019-11-07 ENCOUNTER — OFFICE VISIT (OUTPATIENT)
Dept: NEUROSURGERY | Facility: CLINIC | Age: 29
End: 2019-11-07
Payer: MEDICAID

## 2019-11-07 VITALS
DIASTOLIC BLOOD PRESSURE: 72 MMHG | HEART RATE: 80 BPM | WEIGHT: 144 LBS | SYSTOLIC BLOOD PRESSURE: 123 MMHG | TEMPERATURE: 99 F | HEIGHT: 59 IN | BODY MASS INDEX: 29.03 KG/M2

## 2019-11-07 DIAGNOSIS — C71.9 ASTROCYTOMA: Primary | ICD-10-CM

## 2019-11-07 DIAGNOSIS — Q85.01 NEUROFIBROMATOSIS, PERIPHERAL, NF1: ICD-10-CM

## 2019-11-07 PROCEDURE — 99999 PR PBB SHADOW E&M-EST. PATIENT-LVL III: CPT | Mod: PBBFAC,,, | Performed by: NEUROLOGICAL SURGERY

## 2019-11-07 PROCEDURE — 99999 PR PBB SHADOW E&M-EST. PATIENT-LVL III: ICD-10-PCS | Mod: PBBFAC,,, | Performed by: NEUROLOGICAL SURGERY

## 2019-11-07 PROCEDURE — 99213 OFFICE O/P EST LOW 20 MIN: CPT | Mod: S$PBB,,, | Performed by: NEUROLOGICAL SURGERY

## 2019-11-07 PROCEDURE — 99213 OFFICE O/P EST LOW 20 MIN: CPT | Mod: PBBFAC | Performed by: NEUROLOGICAL SURGERY

## 2019-11-07 PROCEDURE — 99213 PR OFFICE/OUTPT VISIT, EST, LEVL III, 20-29 MIN: ICD-10-PCS | Mod: S$PBB,,, | Performed by: NEUROLOGICAL SURGERY

## 2019-11-26 NOTE — PROGRESS NOTES
Kadie Mccann returned in neurosurgical followup to the office today.  She   has continued with physical and speech therapy over the last few months and has   made progress.  She feels that her muscles are stronger.  She says she can walk   long distances, but when she runs, she can only run for about 2 minutes.  She   noted some fullness in the right thigh.    On brief examination today, she is alert and cooperative.  Her speech rhythm   remains somewhat impaired, but she is easily able to make herself understood.    Extraocular movements are good and she is moving all extremities well.  The   right thigh is a little more prominent than the left, however, there is no clear   lesion noted and no tenderness over this area.  Gait remains broad-based, but   she did not lose her balance.    She has made some progress.  She asked about being involved with [employer].  She can look into this.  I see no neurosurgical contraindication for   participation.  She is continuing to raise her son who is 10 years old.  We have   already planned followup MRI of the brain and lumbar spine for this summer and   I will see her back then.        STACEY  dd: 11/07/2019 13:26:16 (CST)  td: 11/08/2019 03:28:44 (CST)  Doc ID   #[id number]  Job ID #[id number]    CC: Kadie Mccann

## 2020-03-19 ENCOUNTER — DOCUMENTATION ONLY (OUTPATIENT)
Dept: REHABILITATION | Facility: HOSPITAL | Age: 30
End: 2020-03-19

## 2020-03-19 PROBLEM — R47.1 DYSARTHRIA: Status: RESOLVED | Noted: 2019-07-11 | Resolved: 2020-03-19

## 2020-03-19 NOTE — PROGRESS NOTES
Kadie RAKESH Mccann  03/19/2022  [id number]    Discharge    Pt was evaluated on 07/11/19 and was seen 1 times for ST. Pt has not attended therapy since initial ST evaluation on 07/11/19.  Current status is unknown. Pt to be d/c'd at this time.    Christel Aguayo M.S.CCC-SLP  Speech Language Pathologist

## 2020-03-31 ENCOUNTER — TELEPHONE (OUTPATIENT)
Dept: FAMILY MEDICINE | Facility: HOSPITAL | Age: 30
End: 2020-03-31

## 2020-03-31 NOTE — TELEPHONE ENCOUNTER
*This is a telephone encounter that is replacing a normal clinic visit. Due to COVID-19, we are limiting non-urgent clinic visits and I will address patient's concerns to the best of my ability via telephone. If I feel a clinic visit is necessary, proper arrangements will be made to do so.     S: Kadie Mccann is a 29 y.o. female with PMHx of neurofibromatosis type 1 who attended a televisit with concerns regarding a recent fall.    Fell while playing wrestling with her son and was on top of an exercise ball.  Pt then fell on top of a wooden portion of the furniture.  Not getting worse but has not started healing.    Still has sense of taste, no sense of smell    Does feeling the oncoming of a cold.  +rhinnorhea.  +cough   Did have an isolated temp of 100.0 two days ago.    Denies chills, nausea, vomiting, constipation, diarrhea, abdominal pain, no SOB, no wheezing, no respiratory distress    A/P:     R68.89 - Suspected COVID-19   Z71.89 - advice given for COVID-19  S80.10XA - leg contusion (advised pt to give it more time)      Recommended pt treat herself as potentially infected and try to clean that house as much as possible.  Advice given to avoid NSAIDS if possible.  Offered testing, but pt choosing to stay home and self-isolate.    Patient instructed to contact the clinic at [phone] with any additional questions or concerns.    Case discussed with Staff    [place]      Samir Richardson MD  [facility]   2020 10:58 AM    This note was partially created using [employer] Mobile Voice Recognition software. There may be occasional misinterpreted words, typos, or grammatical errors that were not caught upon review.     The patient location is: Home  The chief complaint leading to consultation is:   Visit type: Virtual visit with synchronous audio  Total time spent with patient: 20 min  Each patient to whom he or she provides medical services by telemedicine is:  (1) informed of the  relationship between the physician and patient and the respective role of any other health care provider with respect to management of the patient; and (2) notified that he or she may decline to receive medical services by telemedicine and may withdraw from such care at any time.

## 2020-07-07 ENCOUNTER — OFFICE VISIT (OUTPATIENT)
Dept: FAMILY MEDICINE | Facility: HOSPITAL | Age: 30
End: 2020-07-07
Payer: MEDICAID

## 2020-07-07 ENCOUNTER — LAB VISIT (OUTPATIENT)
Dept: LAB | Facility: HOSPITAL | Age: 30
End: 2020-07-07
Attending: STUDENT IN AN ORGANIZED HEALTH CARE EDUCATION/TRAINING PROGRAM
Payer: MEDICAID

## 2020-07-07 VITALS
HEIGHT: 59 IN | SYSTOLIC BLOOD PRESSURE: 136 MMHG | BODY MASS INDEX: 31.83 KG/M2 | WEIGHT: 157.88 LBS | DIASTOLIC BLOOD PRESSURE: 91 MMHG | HEART RATE: 101 BPM

## 2020-07-07 DIAGNOSIS — Z00.00 HEALTH MAINTENANCE EXAMINATION: ICD-10-CM

## 2020-07-07 DIAGNOSIS — Z30.41 ORAL CONTRACEPTIVE USE: Primary | ICD-10-CM

## 2020-07-07 LAB
ALBUMIN SERPL BCP-MCNC: 3.6 G/DL (ref 3.5–5.2)
ALP SERPL-CCNC: 63 U/L (ref 55–135)
ALT SERPL W/O P-5'-P-CCNC: 23 U/L (ref 10–44)
ANION GAP SERPL CALC-SCNC: 7 MMOL/L (ref 8–16)
AST SERPL-CCNC: 19 U/L (ref 10–40)
BASOPHILS # BLD AUTO: 0.03 K/UL (ref 0–0.2)
BASOPHILS NFR BLD: 0.4 % (ref 0–1.9)
BILIRUB SERPL-MCNC: 0.2 MG/DL (ref 0.1–1)
BUN SERPL-MCNC: 6 MG/DL (ref 6–20)
CALCIUM SERPL-MCNC: 9 MG/DL (ref 8.7–10.5)
CHLORIDE SERPL-SCNC: 105 MMOL/L (ref 95–110)
CHOLEST SERPL-MCNC: 177 MG/DL (ref 120–199)
CHOLEST/HDLC SERPL: 4.9 {RATIO} (ref 2–5)
CO2 SERPL-SCNC: 27 MMOL/L (ref 23–29)
CREAT SERPL-MCNC: 0.6 MG/DL (ref 0.5–1.4)
DIFFERENTIAL METHOD: ABNORMAL
EOSINOPHIL # BLD AUTO: 0.2 K/UL (ref 0–0.5)
EOSINOPHIL NFR BLD: 2.7 % (ref 0–8)
ERYTHROCYTE [DISTWIDTH] IN BLOOD BY AUTOMATED COUNT: 14.7 % (ref 11.5–14.5)
EST. GFR  (AFRICAN AMERICAN): >60 ML/MIN/1.73 M^2
EST. GFR  (NON AFRICAN AMERICAN): >60 ML/MIN/1.73 M^2
ESTIMATED AVG GLUCOSE: 100 MG/DL (ref 68–131)
GLUCOSE SERPL-MCNC: 102 MG/DL (ref 70–110)
HBA1C MFR BLD HPLC: 5.1 % (ref 4–5.6)
HCT VFR BLD AUTO: 41.3 % (ref 37–48.5)
HDLC SERPL-MCNC: 36 MG/DL (ref 40–75)
HDLC SERPL: 20.3 % (ref 20–50)
HGB BLD-MCNC: 12.7 G/DL (ref 12–16)
IMM GRANULOCYTES # BLD AUTO: 0.03 K/UL (ref 0–0.04)
IMM GRANULOCYTES NFR BLD AUTO: 0.4 % (ref 0–0.5)
LDLC SERPL CALC-MCNC: 68.8 MG/DL (ref 63–159)
LYMPHOCYTES # BLD AUTO: 2.2 K/UL (ref 1–4.8)
LYMPHOCYTES NFR BLD: 27.3 % (ref 18–48)
MCH RBC QN AUTO: 27.5 PG (ref 27–31)
MCHC RBC AUTO-ENTMCNC: 30.8 G/DL (ref 32–36)
MCV RBC AUTO: 89 FL (ref 82–98)
MONOCYTES # BLD AUTO: 0.5 K/UL (ref 0.3–1)
MONOCYTES NFR BLD: 5.6 % (ref 4–15)
NEUTROPHILS # BLD AUTO: 5.1 K/UL (ref 1.8–7.7)
NEUTROPHILS NFR BLD: 63.6 % (ref 38–73)
NONHDLC SERPL-MCNC: 141 MG/DL
NRBC BLD-RTO: 0 /100 WBC
PLATELET # BLD AUTO: 225 K/UL (ref 150–350)
PLATELET BLD QL SMEAR: ABNORMAL
PMV BLD AUTO: 11.7 FL (ref 9.2–12.9)
POTASSIUM SERPL-SCNC: 4 MMOL/L (ref 3.5–5.1)
PROT SERPL-MCNC: 7.4 G/DL (ref 6–8.4)
RBC # BLD AUTO: 4.62 M/UL (ref 4–5.4)
SODIUM SERPL-SCNC: 139 MMOL/L (ref 136–145)
TRIGL SERPL-MCNC: 361 MG/DL (ref 30–150)
TSH SERPL DL<=0.005 MIU/L-ACNC: 1.49 UIU/ML (ref 0.4–4)
WBC # BLD AUTO: 8.05 K/UL (ref 3.9–12.7)

## 2020-07-07 PROCEDURE — 80061 LIPID PANEL: CPT

## 2020-07-07 PROCEDURE — 82306 VITAMIN D 25 HYDROXY: CPT

## 2020-07-07 PROCEDURE — 85025 COMPLETE CBC W/AUTO DIFF WBC: CPT

## 2020-07-07 PROCEDURE — 80053 COMPREHEN METABOLIC PANEL: CPT

## 2020-07-07 PROCEDURE — 86703 HIV-1/HIV-2 1 RESULT ANTBDY: CPT

## 2020-07-07 PROCEDURE — 83036 HEMOGLOBIN GLYCOSYLATED A1C: CPT

## 2020-07-07 PROCEDURE — 84443 ASSAY THYROID STIM HORMONE: CPT

## 2020-07-07 PROCEDURE — 36415 COLL VENOUS BLD VENIPUNCTURE: CPT

## 2020-07-07 PROCEDURE — 99213 OFFICE O/P EST LOW 20 MIN: CPT | Performed by: STUDENT IN AN ORGANIZED HEALTH CARE EDUCATION/TRAINING PROGRAM

## 2020-07-07 RX ORDER — LEVONORGESTREL AND ETHINYL ESTRADIOL 6-5-10
1 KIT ORAL DAILY
Qty: 28 TABLET | Refills: 11 | Status: SHIPPED | OUTPATIENT
Start: 2020-07-07 | End: 2023-02-23

## 2020-07-08 PROBLEM — Z30.41 ORAL CONTRACEPTIVE USE: Status: ACTIVE | Noted: 2020-07-08

## 2020-07-08 LAB
25(OH)D3+25(OH)D2 SERPL-MCNC: 18 NG/ML (ref 30–96)
HIV 1+2 AB+HIV1 P24 AG SERPL QL IA: NEGATIVE

## 2020-07-08 NOTE — PROGRESS NOTES
Subjective:       Patient ID: Kadie Mccann is a 29 y.o. female.    Chief Complaint: Medication Refill    HPI   Pt is a 28 yo F w/ pmhx of NF-1, intracranial tumor (astrocytoma) s/p chemo/rads/resection, depression, neuropathic pain, presenting for birth control refill. Pt states she has been doing well recently. She has no complaints. She has been compliant with her OCP. Is currently sexually active w/ 1 male partner. Does not wish to have children at this time. Gets period monthly, denies irregular bleeding, intermenstrual bleeding, spotting. Denies hx of STI. Denies condom use. Pt denies bleeding, hx of DVT/blood clots, hx of HTN. Otherwise ROS negative.    Last Pap smear 2017, follows with an OBGYN for Pap smears. Declines Pap smear today.    Review of Systems   Constitutional: Negative for chills, diaphoresis, fatigue and fever.   HENT: Negative for congestion and rhinorrhea.    Eyes: Negative for discharge and visual disturbance.   Respiratory: Negative for cough, chest tightness, shortness of breath and wheezing.    Cardiovascular: Negative for chest pain, palpitations and leg swelling.   Gastrointestinal: Negative for abdominal pain, diarrhea, nausea and vomiting.   Endocrine: Negative for polydipsia and polyuria.   Genitourinary: Negative for dysuria, flank pain and hematuria.   Musculoskeletal: Negative for arthralgias and myalgias.   Skin: Negative for color change and rash.   Neurological: Negative for dizziness, syncope, weakness and headaches.   Psychiatric/Behavioral: Negative for confusion and sleep disturbance. The patient is not nervous/anxious.        Objective:      Vitals:    07/07/20 1052   BP: (!) 136/91   Pulse: 101     Physical Exam  Vitals signs and nursing note reviewed.   Constitutional:       General: She is not in acute distress.     Appearance: She is well-developed. She is not diaphoretic.   HENT:      Head: Normocephalic and atraumatic.      Mouth/Throat:      Pharynx: No  oropharyngeal exudate.   Eyes:      Conjunctiva/sclera: Conjunctivae normal.      Pupils: Pupils are equal, round, and reactive to light.   Neck:      Musculoskeletal: Normal range of motion and neck supple.      Vascular: No JVD.   Cardiovascular:      Rate and Rhythm: Normal rate and regular rhythm.      Heart sounds: Normal heart sounds. No murmur.   Pulmonary:      Effort: Pulmonary effort is normal. No respiratory distress.      Breath sounds: Normal breath sounds. No wheezing or rales.   Abdominal:      General: Bowel sounds are normal. There is no distension.      Palpations: Abdomen is soft. There is no mass.      Tenderness: There is no abdominal tenderness.   Musculoskeletal: Normal range of motion.   Skin:     General: Skin is warm.      Capillary Refill: Capillary refill takes less than 2 seconds.      Comments: Multiple cafe-au-lait spots   Neurological:      Mental Status: She is alert and oriented to person, place, and time.      Cranial Nerves: No cranial nerve deficit.      Comments: Dysarthric at baseline   Psychiatric:         Behavior: Behavior normal.         Assessment:       1. Oral contraceptive use    2. Health maintenance examination        Plan:       Oral contraceptive use  -     levonorgestrel-ethinyl estradiol (TRIVORA, 28,) 50-30 (6)/75-40 (5)/125-30(10) per tablet; Take 1 tablet by mouth once daily.  Dispense: 28 tablet; Refill: 11    Health maintenance examination  -     Comprehensive metabolic panel; Future; Expected date: 07/07/2020  -     TSH; Future; Expected date: 07/07/2020  -     Vitamin D; Future; Expected date: 07/07/2020  -     Hemoglobin A1C; Future; Expected date: 07/07/2020  -     Lipid Panel; Future; Expected date: 07/07/2020  -     CBC auto differential; Future; Expected date: 07/07/2020  -     HIV 1/2 Ag/Ab (4th Gen); Future; Expected date: 07/07/2020    Pt doing well on OCP. Refilled OCP. Counseled pt to either f/u with OBGYN for pap smear or make appointment for pap  smear with another physician (female) in our clinic. Pt denies pap today. Will do basic screening labs.    Follow up in about 6 months (around 1/7/2021), or if symptoms worsen or fail to improve, for OCP use, Pap smear.      Austen Brown, DO -[facility]

## 2020-08-07 ENCOUNTER — TELEPHONE (OUTPATIENT)
Dept: NEUROSURGERY | Facility: CLINIC | Age: 30
End: 2020-08-07

## 2020-08-07 DIAGNOSIS — Q85.00 NF (NEUROFIBROMATOSIS): Primary | ICD-10-CM

## 2020-08-07 DIAGNOSIS — C71.6 CEREBELLAR ASTROCYTOMA: ICD-10-CM

## 2020-09-13 ENCOUNTER — TELEPHONE (OUTPATIENT)
Dept: NEUROSURGERY | Facility: CLINIC | Age: 30
End: 2020-09-13

## 2020-09-13 NOTE — PROGRESS NOTES
Pt requested to rescheduled her MRIs and F/U w/Dr. Moreno to 09.21.2020.  Appts rescheduled, denies any needs at this time  V/U.

## 2020-09-21 ENCOUNTER — HOSPITAL ENCOUNTER (OUTPATIENT)
Dept: RADIOLOGY | Facility: HOSPITAL | Age: 30
Discharge: HOME | End: 2020-09-21
Attending: NEUROLOGICAL SURGERY
Payer: MEDICAID

## 2020-09-21 ENCOUNTER — OFFICE VISIT (OUTPATIENT)
Dept: NEUROSURGERY | Facility: CLINIC | Age: 30
End: 2020-09-21
Payer: MEDICAID

## 2020-09-21 VITALS
WEIGHT: 157 LBS | DIASTOLIC BLOOD PRESSURE: 89 MMHG | TEMPERATURE: 100 F | BODY MASS INDEX: 31.65 KG/M2 | HEIGHT: 59 IN | SYSTOLIC BLOOD PRESSURE: 130 MMHG | HEART RATE: 87 BPM

## 2020-09-21 DIAGNOSIS — C71.6 CEREBELLAR ASTROCYTOMA: Primary | ICD-10-CM

## 2020-09-21 DIAGNOSIS — Q85.00 NF (NEUROFIBROMATOSIS): ICD-10-CM

## 2020-09-21 DIAGNOSIS — Q85.01 NEUROFIBROMATOSIS, TYPE 1: ICD-10-CM

## 2020-09-21 DIAGNOSIS — C71.6 CEREBELLAR ASTROCYTOMA: ICD-10-CM

## 2020-09-21 PROCEDURE — A9585 GADOBUTROL INJECTION: HCPCS | Performed by: NEUROLOGICAL SURGERY

## 2020-09-21 PROCEDURE — 70553 MRI BRAIN STEM W/O & W/DYE: CPT | Mod: TC

## 2020-09-21 PROCEDURE — 25500020 PHARM REV CODE 255: Performed by: NEUROLOGICAL SURGERY

## 2020-09-21 PROCEDURE — 72158 MRI LUMBAR SPINE W/O & W/DYE: CPT | Mod: 26,,, | Performed by: RADIOLOGY

## 2020-09-21 PROCEDURE — 70553 MRI BRAIN W WO CONTRAST: ICD-10-PCS | Mod: 26,,, | Performed by: RADIOLOGY

## 2020-09-21 PROCEDURE — 99213 OFFICE O/P EST LOW 20 MIN: CPT | Mod: PBBFAC,25 | Performed by: NEUROLOGICAL SURGERY

## 2020-09-21 PROCEDURE — 70553 MRI BRAIN STEM W/O & W/DYE: CPT | Mod: 26,,, | Performed by: RADIOLOGY

## 2020-09-21 PROCEDURE — 99999 PR PBB SHADOW E&M-EST. PATIENT-LVL III: ICD-10-PCS | Mod: PBBFAC,,, | Performed by: NEUROLOGICAL SURGERY

## 2020-09-21 PROCEDURE — 72158 MRI LUMBAR SPINE W WO CONTRAST: ICD-10-PCS | Mod: 26,,, | Performed by: RADIOLOGY

## 2020-09-21 PROCEDURE — 99213 PR OFFICE/OUTPT VISIT, EST, LEVL III, 20-29 MIN: ICD-10-PCS | Mod: S$PBB,,, | Performed by: NEUROLOGICAL SURGERY

## 2020-09-21 PROCEDURE — 72158 MRI LUMBAR SPINE W/O & W/DYE: CPT | Mod: TC

## 2020-09-21 PROCEDURE — 99213 OFFICE O/P EST LOW 20 MIN: CPT | Mod: S$PBB,,, | Performed by: NEUROLOGICAL SURGERY

## 2020-09-21 PROCEDURE — 99999 PR PBB SHADOW E&M-EST. PATIENT-LVL III: CPT | Mod: PBBFAC,,, | Performed by: NEUROLOGICAL SURGERY

## 2020-09-21 RX ORDER — GADOBUTROL 604.72 MG/ML
7 INJECTION INTRAVENOUS
Status: COMPLETED | OUTPATIENT
Start: 2020-09-21 | End: 2020-09-21

## 2020-09-21 RX ADMIN — GADOBUTROL 7 ML: 604.72 INJECTION INTRAVENOUS at 02:09

## 2020-09-21 NOTE — PROGRESS NOTES
angeline Mccann  was seen in neurosurgical follow-up at the office this afternoon.  She has continued with her exercise program.  With COVID-19 she has not been able to be as involved with [employer] as she had hoped.  She has had no new neurological symptoms.  She does have intermittent shock-like pain in the left scalp parietal area which lasts for only a few seconds.  Her balance remains mildly impaired.  She has had no seizures or syncopal events    On examination today she is alert and responds appropriately.  Her speech remains somewhat impaired with hesitation and strained articulation but she is easily understood.  She has noted a tumor on her left inner shin.  This is consistent with a neurofibroma.  She has had previous resection of a tumor from the left supraclavicular area.  She shows scattered cafe-au-lait spots.  She does not show specific focal weakness in the lower extremities.    MRI of the brain was done at [facility] today.  Again is seen in the left cerebellar tumor extending into the superior cerebellar peduncle.  This is unchanged in size and shape from her study of a year ago.  She does not have hydrocephalus. There are no other clear cerebral lesions.  MRI lumbar spine was compared to her study of 11/05/2018.  Again is noted a small neurofibroma anteriorly at T12.  This enhances homogeneously.  Size is unchanged between the 2 studies.  The tumor is not well shown on axial views.    I have no new recommendations for her.  She should continue with her exercising and training.  She does drive a little.  I will have her return in 2 years with follow-up studies of the brain and lumbar spine.

## 2021-01-20 ENCOUNTER — CLINICAL SUPPORT (OUTPATIENT)
Dept: URGENT CARE | Facility: CLINIC | Age: 31
End: 2021-01-20
Payer: MEDICAID

## 2021-01-20 DIAGNOSIS — Z20.822 ENCOUNTER FOR LABORATORY TESTING FOR COVID-19 VIRUS: Primary | ICD-10-CM

## 2021-02-09 ENCOUNTER — CLINICAL SUPPORT (OUTPATIENT)
Dept: URGENT CARE | Facility: CLINIC | Age: 31
End: 2021-02-09
Payer: MEDICAID

## 2021-02-09 DIAGNOSIS — Z20.822 ENCOUNTER FOR LABORATORY TESTING FOR COVID-19 VIRUS: Primary | ICD-10-CM

## 2021-02-09 DIAGNOSIS — Z78.9 NO KNOWN HEALTH PROBLEMS: Primary | ICD-10-CM

## 2021-02-09 LAB
CTP QC/QA: YES
SARS-COV-2 RDRP RESP QL NAA+PROBE: NEGATIVE

## 2021-02-09 PROCEDURE — 99211 PR OFFICE/OUTPT VISIT, EST, LEVL I: ICD-10-PCS | Mod: S$GLB,,, | Performed by: NURSE PRACTITIONER

## 2021-02-09 PROCEDURE — U0002 COVID-19 LAB TEST NON-CDC: HCPCS | Mod: QW,S$GLB,, | Performed by: FAMILY MEDICINE

## 2021-02-09 PROCEDURE — U0002: ICD-10-PCS | Mod: QW,S$GLB,, | Performed by: FAMILY MEDICINE

## 2021-02-09 PROCEDURE — 99211 OFF/OP EST MAY X REQ PHY/QHP: CPT | Mod: S$GLB,,, | Performed by: NURSE PRACTITIONER

## 2021-03-24 ENCOUNTER — TELEPHONE (OUTPATIENT)
Dept: NEUROSURGERY | Facility: CLINIC | Age: 31
End: 2021-03-24

## 2021-03-26 ENCOUNTER — IMMUNIZATION (OUTPATIENT)
Dept: PHARMACY | Facility: CLINIC | Age: 31
End: 2021-03-26
Payer: MEDICAID

## 2021-03-26 DIAGNOSIS — Z23 NEED FOR VACCINATION: Primary | ICD-10-CM

## 2021-05-10 ENCOUNTER — IMMUNIZATION (OUTPATIENT)
Dept: PHARMACY | Facility: CLINIC | Age: 31
End: 2021-05-10
Payer: MEDICAID

## 2021-05-10 DIAGNOSIS — Z23 NEED FOR VACCINATION: Primary | ICD-10-CM

## 2021-06-01 ENCOUNTER — OFFICE VISIT (OUTPATIENT)
Dept: FAMILY MEDICINE | Facility: HOSPITAL | Age: 31
End: 2021-06-01
Payer: MEDICAID

## 2021-06-01 VITALS
HEIGHT: 59 IN | BODY MASS INDEX: 32.94 KG/M2 | HEART RATE: 83 BPM | WEIGHT: 163.38 LBS | SYSTOLIC BLOOD PRESSURE: 112 MMHG | DIASTOLIC BLOOD PRESSURE: 74 MMHG

## 2021-06-01 DIAGNOSIS — C71.6 CEREBELLAR ASTROCYTOMA: Primary | ICD-10-CM

## 2021-06-01 PROCEDURE — 99214 OFFICE O/P EST MOD 30 MIN: CPT | Performed by: STUDENT IN AN ORGANIZED HEALTH CARE EDUCATION/TRAINING PROGRAM

## 2021-06-11 ENCOUNTER — CLINICAL SUPPORT (OUTPATIENT)
Dept: REHABILITATION | Facility: HOSPITAL | Age: 31
End: 2021-06-11
Payer: MEDICAID

## 2021-06-11 DIAGNOSIS — Z78.9 IMPAIRED INSTRUMENTAL ACTIVITIES OF DAILY LIVING (IADL): ICD-10-CM

## 2021-06-11 DIAGNOSIS — R41.841 COGNITIVE COMMUNICATION DEFICIT: ICD-10-CM

## 2021-06-11 DIAGNOSIS — R47.1 DYSARTHRIA: ICD-10-CM

## 2021-06-11 DIAGNOSIS — R27.8 IMPAIRED COORDINATION OF UPPER EXTREMITY: ICD-10-CM

## 2021-06-11 DIAGNOSIS — C71.6 CEREBELLAR ASTROCYTOMA: ICD-10-CM

## 2021-06-11 DIAGNOSIS — Q85.00 NEUROFIBROMATOSIS: Primary | ICD-10-CM

## 2021-06-11 PROBLEM — M54.50 LOW BACK PAIN: Status: RESOLVED | Noted: 2019-06-18 | Resolved: 2021-06-11

## 2021-06-11 PROBLEM — Z91.81 RISK FOR FALLS: Status: RESOLVED | Noted: 2019-06-18 | Resolved: 2021-06-11

## 2021-06-11 PROBLEM — R26.89 BALANCE PROBLEMS: Status: RESOLVED | Noted: 2017-10-10 | Resolved: 2021-06-11

## 2021-06-11 PROBLEM — R26.81 UNSTEADY GAIT: Status: RESOLVED | Noted: 2019-06-18 | Resolved: 2021-06-11

## 2021-06-11 PROCEDURE — 97166 OT EVAL MOD COMPLEX 45 MIN: CPT | Mod: PN

## 2021-06-11 PROCEDURE — 97110 THERAPEUTIC EXERCISES: CPT | Mod: PN

## 2021-06-11 PROCEDURE — 92523 SPEECH SOUND LANG COMPREHEN: CPT | Mod: PN

## 2021-06-14 ENCOUNTER — CLINICAL SUPPORT (OUTPATIENT)
Dept: REHABILITATION | Facility: HOSPITAL | Age: 31
End: 2021-06-14
Payer: MEDICAID

## 2021-06-14 DIAGNOSIS — R41.841 COGNITIVE COMMUNICATION DEFICIT: ICD-10-CM

## 2021-06-14 DIAGNOSIS — R47.1 DYSARTHRIA: ICD-10-CM

## 2021-06-14 PROCEDURE — 92507 TX SP LANG VOICE COMM INDIV: CPT | Mod: PN

## 2021-06-16 ENCOUNTER — CLINICAL SUPPORT (OUTPATIENT)
Dept: REHABILITATION | Facility: HOSPITAL | Age: 31
End: 2021-06-16
Payer: MEDICAID

## 2021-06-16 DIAGNOSIS — Z78.9 IMPAIRED INSTRUMENTAL ACTIVITIES OF DAILY LIVING (IADL): ICD-10-CM

## 2021-06-16 DIAGNOSIS — R27.8 IMPAIRED COORDINATION OF UPPER EXTREMITY: ICD-10-CM

## 2021-06-16 PROCEDURE — 97530 THERAPEUTIC ACTIVITIES: CPT | Mod: PN

## 2021-06-17 ENCOUNTER — CLINICAL SUPPORT (OUTPATIENT)
Dept: REHABILITATION | Facility: HOSPITAL | Age: 31
End: 2021-06-17
Payer: MEDICAID

## 2021-06-17 DIAGNOSIS — R27.8 COORDINATION IMPAIRMENT: ICD-10-CM

## 2021-06-17 DIAGNOSIS — Z74.09 IMPAIRED FUNCTIONAL MOBILITY, BALANCE, GAIT, AND ENDURANCE: ICD-10-CM

## 2021-06-17 DIAGNOSIS — R29.818 IMPAIRED PROPRIOCEPTION: ICD-10-CM

## 2021-06-17 PROCEDURE — 97161 PT EVAL LOW COMPLEX 20 MIN: CPT | Mod: PN

## 2021-06-24 ENCOUNTER — TELEPHONE (OUTPATIENT)
Dept: REHABILITATION | Facility: HOSPITAL | Age: 31
End: 2021-06-24

## 2021-06-25 ENCOUNTER — CLINICAL SUPPORT (OUTPATIENT)
Dept: URGENT CARE | Facility: CLINIC | Age: 31
End: 2021-06-25
Payer: MEDICAID

## 2021-06-25 ENCOUNTER — TELEPHONE (OUTPATIENT)
Dept: FAMILY MEDICINE | Facility: HOSPITAL | Age: 31
End: 2021-06-25

## 2021-06-25 DIAGNOSIS — Z20.822 COVID-19 RULED OUT: Primary | ICD-10-CM

## 2021-06-25 LAB
CTP QC/QA: YES
SARS-COV-2 RDRP RESP QL NAA+PROBE: NEGATIVE

## 2021-06-25 PROCEDURE — U0002: ICD-10-PCS | Mod: QW,S$GLB,, | Performed by: PHYSICIAN ASSISTANT

## 2021-06-25 PROCEDURE — U0002 COVID-19 LAB TEST NON-CDC: HCPCS | Mod: QW,S$GLB,, | Performed by: PHYSICIAN ASSISTANT

## 2021-07-02 ENCOUNTER — DOCUMENTATION ONLY (OUTPATIENT)
Dept: REHABILITATION | Facility: HOSPITAL | Age: 31
End: 2021-07-02

## 2021-07-13 ENCOUNTER — TELEPHONE (OUTPATIENT)
Dept: REHABILITATION | Facility: HOSPITAL | Age: 31
End: 2021-07-13

## 2021-07-14 ENCOUNTER — CLINICAL SUPPORT (OUTPATIENT)
Dept: REHABILITATION | Facility: HOSPITAL | Age: 31
End: 2021-07-14
Payer: MEDICAID

## 2021-07-14 DIAGNOSIS — R41.841 COGNITIVE COMMUNICATION DEFICIT: ICD-10-CM

## 2021-07-14 DIAGNOSIS — R27.8 IMPAIRED COORDINATION OF UPPER EXTREMITY: ICD-10-CM

## 2021-07-14 DIAGNOSIS — Z78.9 IMPAIRED INSTRUMENTAL ACTIVITIES OF DAILY LIVING (IADL): ICD-10-CM

## 2021-07-14 DIAGNOSIS — R47.1 DYSARTHRIA: ICD-10-CM

## 2021-07-14 PROCEDURE — 97530 THERAPEUTIC ACTIVITIES: CPT | Mod: PN

## 2021-07-14 PROCEDURE — 92507 TX SP LANG VOICE COMM INDIV: CPT | Mod: PN

## 2021-07-21 ENCOUNTER — CLINICAL SUPPORT (OUTPATIENT)
Dept: URGENT CARE | Facility: CLINIC | Age: 31
End: 2021-07-21
Payer: MEDICAID

## 2021-07-21 DIAGNOSIS — Z78.9 NO KNOWN HEALTH PROBLEMS: Primary | ICD-10-CM

## 2021-07-21 LAB
CTP QC/QA: YES
SARS-COV-2 RDRP RESP QL NAA+PROBE: NEGATIVE

## 2021-07-21 PROCEDURE — 87635: ICD-10-PCS | Mod: QW,S$GLB,, | Performed by: PHYSICIAN ASSISTANT

## 2021-07-21 PROCEDURE — 99211 OFF/OP EST MAY X REQ PHY/QHP: CPT | Mod: S$GLB,CS,, | Performed by: PHYSICIAN ASSISTANT

## 2021-07-21 PROCEDURE — 99211 PR OFFICE/OUTPT VISIT, EST, LEVL I: ICD-10-PCS | Mod: S$GLB,CS,, | Performed by: PHYSICIAN ASSISTANT

## 2021-07-21 PROCEDURE — 87635 SARS-COV-2 COVID-19 AMP PRB: CPT | Mod: QW,S$GLB,, | Performed by: PHYSICIAN ASSISTANT

## 2021-07-23 ENCOUNTER — DOCUMENTATION ONLY (OUTPATIENT)
Dept: REHABILITATION | Facility: HOSPITAL | Age: 31
End: 2021-07-23

## 2021-07-26 ENCOUNTER — CLINICAL SUPPORT (OUTPATIENT)
Dept: REHABILITATION | Facility: HOSPITAL | Age: 31
End: 2021-07-26
Payer: MEDICAID

## 2021-07-26 ENCOUNTER — TELEPHONE (OUTPATIENT)
Dept: REHABILITATION | Facility: HOSPITAL | Age: 31
End: 2021-07-26

## 2021-07-26 DIAGNOSIS — R41.841 COGNITIVE COMMUNICATION DEFICIT: ICD-10-CM

## 2021-07-26 DIAGNOSIS — Z78.9 IMPAIRED INSTRUMENTAL ACTIVITIES OF DAILY LIVING (IADL): ICD-10-CM

## 2021-07-26 DIAGNOSIS — Z74.09 IMPAIRED FUNCTIONAL MOBILITY, BALANCE, GAIT, AND ENDURANCE: Primary | ICD-10-CM

## 2021-07-26 DIAGNOSIS — R27.8 IMPAIRED COORDINATION OF UPPER EXTREMITY: ICD-10-CM

## 2021-07-26 DIAGNOSIS — R27.8 COORDINATION IMPAIRMENT: ICD-10-CM

## 2021-07-26 DIAGNOSIS — R29.818 IMPAIRED PROPRIOCEPTION: ICD-10-CM

## 2021-07-26 DIAGNOSIS — R47.1 DYSARTHRIA: ICD-10-CM

## 2021-07-26 PROCEDURE — 97530 THERAPEUTIC ACTIVITIES: CPT | Mod: PN,59

## 2021-07-26 PROCEDURE — 97110 THERAPEUTIC EXERCISES: CPT | Mod: PN

## 2021-07-26 PROCEDURE — 92507 TX SP LANG VOICE COMM INDIV: CPT | Mod: PN

## 2021-07-28 ENCOUNTER — DOCUMENTATION ONLY (OUTPATIENT)
Dept: REHABILITATION | Facility: HOSPITAL | Age: 31
End: 2021-07-28

## 2021-07-28 ENCOUNTER — CLINICAL SUPPORT (OUTPATIENT)
Dept: REHABILITATION | Facility: HOSPITAL | Age: 31
End: 2021-07-28
Payer: MEDICAID

## 2021-07-28 DIAGNOSIS — R47.1 DYSARTHRIA: ICD-10-CM

## 2021-07-28 DIAGNOSIS — R41.841 COGNITIVE COMMUNICATION DEFICIT: ICD-10-CM

## 2021-07-28 PROCEDURE — 92507 TX SP LANG VOICE COMM INDIV: CPT | Mod: PN

## 2021-07-29 ENCOUNTER — CLINICAL SUPPORT (OUTPATIENT)
Dept: REHABILITATION | Facility: HOSPITAL | Age: 31
End: 2021-07-29
Payer: MEDICAID

## 2021-07-29 DIAGNOSIS — R29.818 IMPAIRED PROPRIOCEPTION: ICD-10-CM

## 2021-07-29 DIAGNOSIS — Z74.09 IMPAIRED FUNCTIONAL MOBILITY, BALANCE, GAIT, AND ENDURANCE: ICD-10-CM

## 2021-07-29 DIAGNOSIS — R27.8 COORDINATION IMPAIRMENT: ICD-10-CM

## 2021-07-29 PROCEDURE — 97110 THERAPEUTIC EXERCISES: CPT | Mod: PN

## 2021-08-03 ENCOUNTER — CLINICAL SUPPORT (OUTPATIENT)
Dept: REHABILITATION | Facility: HOSPITAL | Age: 31
End: 2021-08-03
Payer: MEDICAID

## 2021-08-03 ENCOUNTER — HOSPITAL ENCOUNTER (OUTPATIENT)
Dept: RADIOLOGY | Facility: HOSPITAL | Age: 31
Discharge: HOME | End: 2021-08-03
Attending: STUDENT IN AN ORGANIZED HEALTH CARE EDUCATION/TRAINING PROGRAM
Payer: MEDICAID

## 2021-08-03 ENCOUNTER — OFFICE VISIT (OUTPATIENT)
Dept: FAMILY MEDICINE | Facility: HOSPITAL | Age: 31
End: 2021-08-03
Payer: MEDICAID

## 2021-08-03 VITALS
HEIGHT: 60 IN | BODY MASS INDEX: 33.02 KG/M2 | SYSTOLIC BLOOD PRESSURE: 131 MMHG | DIASTOLIC BLOOD PRESSURE: 82 MMHG | HEART RATE: 84 BPM | WEIGHT: 168.19 LBS

## 2021-08-03 DIAGNOSIS — M25.531 WRIST PAIN, ACUTE, RIGHT: ICD-10-CM

## 2021-08-03 DIAGNOSIS — Z74.09 IMPAIRED FUNCTIONAL MOBILITY, BALANCE, GAIT, AND ENDURANCE: ICD-10-CM

## 2021-08-03 DIAGNOSIS — M25.531 WRIST PAIN, ACUTE, RIGHT: Primary | ICD-10-CM

## 2021-08-03 DIAGNOSIS — C71.6 CEREBELLAR ASTROCYTOMA: ICD-10-CM

## 2021-08-03 DIAGNOSIS — R27.8 COORDINATION IMPAIRMENT: ICD-10-CM

## 2021-08-03 DIAGNOSIS — Q85.00 NEUROFIBROMATOSIS: ICD-10-CM

## 2021-08-03 DIAGNOSIS — R35.89 POLYURIA: ICD-10-CM

## 2021-08-03 DIAGNOSIS — Z11.59 NEED FOR HEPATITIS C SCREENING TEST: ICD-10-CM

## 2021-08-03 DIAGNOSIS — R29.818 IMPAIRED PROPRIOCEPTION: ICD-10-CM

## 2021-08-03 PROCEDURE — 97110 THERAPEUTIC EXERCISES: CPT | Mod: PN

## 2021-08-03 PROCEDURE — 99213 OFFICE O/P EST LOW 20 MIN: CPT | Performed by: STUDENT IN AN ORGANIZED HEALTH CARE EDUCATION/TRAINING PROGRAM

## 2021-08-03 PROCEDURE — 73110 XR WRIST COMPLETE 3 VIEWS RIGHT: ICD-10-PCS | Mod: 26,RT,, | Performed by: STUDENT IN AN ORGANIZED HEALTH CARE EDUCATION/TRAINING PROGRAM

## 2021-08-03 PROCEDURE — 73110 X-RAY EXAM OF WRIST: CPT | Mod: TC,FY,RT

## 2021-08-03 PROCEDURE — 73110 X-RAY EXAM OF WRIST: CPT | Mod: 26,RT,, | Performed by: STUDENT IN AN ORGANIZED HEALTH CARE EDUCATION/TRAINING PROGRAM

## 2021-08-04 ENCOUNTER — TELEPHONE (OUTPATIENT)
Dept: FAMILY MEDICINE | Facility: HOSPITAL | Age: 31
End: 2021-08-04

## 2021-08-05 ENCOUNTER — CLINICAL SUPPORT (OUTPATIENT)
Dept: REHABILITATION | Facility: HOSPITAL | Age: 31
End: 2021-08-05
Payer: MEDICAID

## 2021-08-05 DIAGNOSIS — R29.818 IMPAIRED PROPRIOCEPTION: ICD-10-CM

## 2021-08-05 DIAGNOSIS — R27.8 COORDINATION IMPAIRMENT: ICD-10-CM

## 2021-08-05 DIAGNOSIS — Z74.09 IMPAIRED FUNCTIONAL MOBILITY, BALANCE, GAIT, AND ENDURANCE: ICD-10-CM

## 2021-08-05 PROCEDURE — 97110 THERAPEUTIC EXERCISES: CPT | Mod: PN,CQ

## 2021-08-06 ENCOUNTER — CLINICAL SUPPORT (OUTPATIENT)
Dept: REHABILITATION | Facility: HOSPITAL | Age: 31
End: 2021-08-06
Payer: MEDICAID

## 2021-08-06 DIAGNOSIS — R41.841 COGNITIVE COMMUNICATION DEFICIT: ICD-10-CM

## 2021-08-06 DIAGNOSIS — R47.1 DYSARTHRIA: ICD-10-CM

## 2021-08-06 PROCEDURE — 92507 TX SP LANG VOICE COMM INDIV: CPT | Mod: PN

## 2021-08-10 ENCOUNTER — TELEPHONE (OUTPATIENT)
Dept: REHABILITATION | Facility: HOSPITAL | Age: 31
End: 2021-08-10

## 2021-08-17 ENCOUNTER — TELEPHONE (OUTPATIENT)
Dept: FAMILY MEDICINE | Facility: HOSPITAL | Age: 31
End: 2021-08-17

## 2022-03-25 ENCOUNTER — TELEPHONE (OUTPATIENT)
Dept: NEUROSURGERY | Facility: CLINIC | Age: 32
End: 2022-03-25
Payer: MEDICAID

## 2022-03-25 DIAGNOSIS — C71.6 CEREBELLAR ASTROCYTOMA: ICD-10-CM

## 2022-03-25 DIAGNOSIS — Q85.01 NEUROFIBROMATOSIS, TYPE 1: Primary | ICD-10-CM

## 2022-04-08 DIAGNOSIS — C71.6 CEREBELLAR ASTROCYTOMA: ICD-10-CM

## 2022-04-08 DIAGNOSIS — Q85.01 NEUROFIBROMATOSIS, TYPE 1: Primary | ICD-10-CM

## 2022-04-08 RX ORDER — ALPRAZOLAM 1 MG/1
TABLET ORAL
Qty: 2 TABLET | Refills: 0 | Status: SHIPPED | OUTPATIENT
Start: 2022-04-08 | End: 2023-02-23

## 2022-04-13 ENCOUNTER — HOSPITAL ENCOUNTER (OUTPATIENT)
Dept: RADIOLOGY | Facility: HOSPITAL | Age: 32
Discharge: HOME | End: 2022-04-13
Attending: NEUROLOGICAL SURGERY
Payer: MEDICAID

## 2022-04-13 DIAGNOSIS — Q85.01 NEUROFIBROMATOSIS, TYPE 1: ICD-10-CM

## 2022-04-13 DIAGNOSIS — C71.6 CEREBELLAR ASTROCYTOMA: ICD-10-CM

## 2022-04-13 PROCEDURE — 72158 MRI LUMBAR SPINE W/O & W/DYE: CPT | Mod: 26,,, | Performed by: RADIOLOGY

## 2022-04-13 PROCEDURE — 72158 MRI LUMBAR SPINE W/O & W/DYE: CPT | Mod: TC

## 2022-04-13 PROCEDURE — 72157 MRI THORACIC SPINE W WO CONTRAST: ICD-10-PCS | Mod: 26,,, | Performed by: RADIOLOGY

## 2022-04-13 PROCEDURE — 25500020 PHARM REV CODE 255: Performed by: NEUROLOGICAL SURGERY

## 2022-04-13 PROCEDURE — 72157 MRI CHEST SPINE W/O & W/DYE: CPT | Mod: TC

## 2022-04-13 PROCEDURE — 72157 MRI CHEST SPINE W/O & W/DYE: CPT | Mod: 26,,, | Performed by: RADIOLOGY

## 2022-04-13 PROCEDURE — 70553 MRI BRAIN STEM W/O & W/DYE: CPT | Mod: 26,,, | Performed by: RADIOLOGY

## 2022-04-13 PROCEDURE — 72158 MRI LUMBAR SPINE W WO CONTRAST: ICD-10-PCS | Mod: 26,,, | Performed by: RADIOLOGY

## 2022-04-13 PROCEDURE — A9585 GADOBUTROL INJECTION: HCPCS | Performed by: NEUROLOGICAL SURGERY

## 2022-04-13 PROCEDURE — 70553 MRI BRAIN STEM W/O & W/DYE: CPT | Mod: TC

## 2022-04-13 PROCEDURE — 70553 MRI BRAIN W WO CONTRAST: ICD-10-PCS | Mod: 26,,, | Performed by: RADIOLOGY

## 2022-04-13 RX ORDER — GADOBUTROL 604.72 MG/ML
8 INJECTION INTRAVENOUS
Status: COMPLETED | OUTPATIENT
Start: 2022-04-13 | End: 2022-04-13

## 2022-04-13 RX ADMIN — GADOBUTROL 8 ML: 604.72 INJECTION INTRAVENOUS at 01:04

## 2022-04-18 ENCOUNTER — OFFICE VISIT (OUTPATIENT)
Dept: NEUROSURGERY | Facility: CLINIC | Age: 32
End: 2022-04-18
Payer: MEDICAID

## 2022-04-18 VITALS
SYSTOLIC BLOOD PRESSURE: 125 MMHG | HEART RATE: 74 BPM | BODY MASS INDEX: 32.98 KG/M2 | DIASTOLIC BLOOD PRESSURE: 83 MMHG | HEIGHT: 60 IN | WEIGHT: 168 LBS

## 2022-04-18 DIAGNOSIS — Q85.01 NEUROFIBROMATOSIS, PERIPHERAL, NF1: Primary | ICD-10-CM

## 2022-04-18 DIAGNOSIS — C71.9 ASTROCYTOMA: ICD-10-CM

## 2022-04-18 PROCEDURE — 99214 OFFICE O/P EST MOD 30 MIN: CPT | Mod: PBBFAC | Performed by: NEUROLOGICAL SURGERY

## 2022-04-18 PROCEDURE — 1159F PR MEDICATION LIST DOCUMENTED IN MEDICAL RECORD: ICD-10-PCS | Mod: CPTII,,, | Performed by: NEUROLOGICAL SURGERY

## 2022-04-18 PROCEDURE — 99999 PR PBB SHADOW E&M-EST. PATIENT-LVL IV: ICD-10-PCS | Mod: PBBFAC,,, | Performed by: NEUROLOGICAL SURGERY

## 2022-04-18 PROCEDURE — 1160F PR REVIEW ALL MEDS BY PRESCRIBER/CLIN PHARMACIST DOCUMENTED: ICD-10-PCS | Mod: CPTII,,, | Performed by: NEUROLOGICAL SURGERY

## 2022-04-18 PROCEDURE — 3079F PR MOST RECENT DIASTOLIC BLOOD PRESSURE 80-89 MM HG: ICD-10-PCS | Mod: CPTII,,, | Performed by: NEUROLOGICAL SURGERY

## 2022-04-18 PROCEDURE — 1160F RVW MEDS BY RX/DR IN RCRD: CPT | Mod: CPTII,,, | Performed by: NEUROLOGICAL SURGERY

## 2022-04-18 PROCEDURE — 99999 PR PBB SHADOW E&M-EST. PATIENT-LVL IV: CPT | Mod: PBBFAC,,, | Performed by: NEUROLOGICAL SURGERY

## 2022-04-18 PROCEDURE — 99213 OFFICE O/P EST LOW 20 MIN: CPT | Mod: S$PBB,,, | Performed by: NEUROLOGICAL SURGERY

## 2022-04-18 PROCEDURE — 3008F PR BODY MASS INDEX (BMI) DOCUMENTED: ICD-10-PCS | Mod: CPTII,,, | Performed by: NEUROLOGICAL SURGERY

## 2022-04-18 PROCEDURE — 99213 PR OFFICE/OUTPT VISIT, EST, LEVL III, 20-29 MIN: ICD-10-PCS | Mod: S$PBB,,, | Performed by: NEUROLOGICAL SURGERY

## 2022-04-18 PROCEDURE — 3074F PR MOST RECENT SYSTOLIC BLOOD PRESSURE < 130 MM HG: ICD-10-PCS | Mod: CPTII,,, | Performed by: NEUROLOGICAL SURGERY

## 2022-04-18 PROCEDURE — 3074F SYST BP LT 130 MM HG: CPT | Mod: CPTII,,, | Performed by: NEUROLOGICAL SURGERY

## 2022-04-18 PROCEDURE — 1159F MED LIST DOCD IN RCRD: CPT | Mod: CPTII,,, | Performed by: NEUROLOGICAL SURGERY

## 2022-04-18 PROCEDURE — 3079F DIAST BP 80-89 MM HG: CPT | Mod: CPTII,,, | Performed by: NEUROLOGICAL SURGERY

## 2022-04-18 PROCEDURE — 3008F BODY MASS INDEX DOCD: CPT | Mod: CPTII,,, | Performed by: NEUROLOGICAL SURGERY

## 2022-04-18 NOTE — PROGRESS NOTES
Kadie Mccann returned in neurosurgical follow up to the office this morning.  I last saw her on 09/21/2020.  She has remained reasonably stable over the last 18 months.  She has continued to do exercising to improve her coordination and balance in this seems to have progressed reasonably well.  She is now able to apply her makeup better and this has improved her confidence.  She continues to have some discomfort on the left side of her head.  She had a similar complaint when last seen.  She is bothered by a soft tissue mass on her left calf.    On brief examination she is alert.  She remains dysarthric but is able to make herself understood well.  Again is noted the tumor on the left lower leg.  This may have increased somewhat in size and shape..  She has some chronic deformity of her hands.    MRI of the brain and thoracic spine were repeated at [facility] on 04/13/2022 and compared to her previous study of 09/21/2020.  Again are seen the changes in the posterior fossa.  There is enhancement along the left cerebellum.  The tumor in the left superior cerebellar peduncle is unchanged.  The ventricular system remains widely patent.  MRI of the thoracic spine again shows a small lesion intradural extramedullary lesion at T12 with homogeneous enhancement.  This is also unchanged.    I will refer her for a more directed physical therapy evaluation with the [facility] PT department and have MRI of the left leg done to better diagnose the mass in that area.

## 2022-04-27 ENCOUNTER — CLINICAL SUPPORT (OUTPATIENT)
Dept: REHABILITATION | Facility: HOSPITAL | Age: 32
End: 2022-04-27
Attending: NEUROLOGICAL SURGERY
Payer: MEDICAID

## 2022-04-27 DIAGNOSIS — Q85.01 NEUROFIBROMATOSIS, PERIPHERAL, NF1: ICD-10-CM

## 2022-04-27 DIAGNOSIS — R41.844 IMPAIRED EXECUTIVE FUNCTIONING: ICD-10-CM

## 2022-04-27 DIAGNOSIS — Z78.9 IMPAIRED INSTRUMENTAL ACTIVITIES OF DAILY LIVING (IADL): Primary | ICD-10-CM

## 2022-04-27 PROCEDURE — 97165 OT EVAL LOW COMPLEX 30 MIN: CPT | Mod: PO

## 2022-04-27 NOTE — PLAN OF CARE
"  [facility] Occupational Therapy  Initial Neurological Evaluation     Date: 4/27/2022  Patient: Kadie Mccann  Chart Number: [id number]    Therapy Diagnosis:   Encounter Diagnoses   Name Primary?    Neurofibromatosis, peripheral, NF1     Impaired executive functioning     Impaired instrumental activities of daily living (IADL) Yes     Physician: Sridhar Moreno MD    Physician Orders: OT eval and treat  Medical Diagnosis: NF1  Evaluation Date: 4/27/2022  Plan of Care Expiration Period: 6/10/22  Insurance Authorization period Expiration: 5/15/2022  Date of Return to MD: TBD  Visit # / Visits Authorized: 1 / 1  FOTO: not completed    Time In:11:45am  Time Out: 12:30pm  Total Billable (one on one) Time: 45 minutes    Precautions: Standard and Fall    Subjective     History of Current Condition: NF1.Has residual tumors in brain/spine. Regular imaging to monitor. Has tumor on L lower leg. Reporting issues with vision as well after chemo/radiation. Returning to therapy to improve over her current status. Biggest complaint is her balance concerns. Holding pots and pans, holding groceries, and activity tolerance is difficult. Having to hold onto wall while completing ADL/IADLs. She is very long winded and wants to return to "normal" and do things without any difficulty.     Involved Side: entire  Dominant Side: Right  Date of Onset: ~16 years ago were initial symptoms.   Surgical Procedure: Several in chart, most recent 5 years ago  Imaging: See epic imaging   Previous Therapy: OT/PT in past    Patient's Goals for Therapy: improve endurance, dress more independently ,     Pain:  Pain Related Behaviors Observed: no   Functional Pain Scale Rating 0-10:   4/10 on average  4/10 at best  7/10 at worst  Location: R leg when she moves it  Description: Aching  Aggravating Factors: movment  Easing Factors: rest    Occupation:  Making book marks, works in flower department making arrangements  Working " "presently: employed  Duties: see above    Functional Limitations/Social History:    Prior Level of Function: Mod I/Min A  Current Level of Function:see chart below    Home/Living environment : lives with their family  Home Access: 2 story home, sleeping on second floor  DME: none     Leisure: Crafts    Driving: during the day, limited at night due to vision    Past Medical History/Physical Systems Review:     Past Medical History:  Kadie Mccann  has a past medical history of Fibromatosis.    Past Surgical History:  Kadie Mccann  has a past surgical history that includes leg biopsy; brain biospy (2016); and  section.    Current Medications:  Kadie CAMERON has a current medication list which includes the following prescription(s): alprazolam and levonorgestrel-ethinyl estradiol.    Allergies:  Review of patient's allergies indicates:   Allergen Reactions    Penicillins         Other: n/a    Objective     Cognitive Exam:  Oriented: Person, Place, Time and Situation  Behaviors: cooperative, restless and fidgety   Follows Commands/attention: Follows multistep  commands  Communication: dysarthria  Memory: Impaired STM after she smokes "weed"   Safety awareness/insight to disability: aware of diagnosis, treatment, and prognosis  Coping skills/emotional control: Appropriate to situation    Visual/Perceptual:  Tracking: impaired     Horizontal: slowe    Vertical: impaired, miss/jumps    Diagonal: slowed   Saccades: impaired all planes   Acuity: corrected by glasses  Convergence: 13cm  Nystagmus: neg   R/L discrimination: intact, delayed  Visual field: intact  Motor Planning Praxis: impaired  Comments: vision deficits concerning for current driving    Physical Exam:  Postural examination/scapula alignment: Rounded shoulder and Slouched posture  Joint integrity: Firm end feeling  Skin integrity: warm/dry  Edema: none observed   Palpation: not TTP      B/L AROM WNL all planes  B/L strength 4+/5 all " planes  Fist: normal      Gross motor coordination:   - RAZ (Rapid Alternating Movements): WFl, slowed L  - Finger to Nose (5 times): WFL  - Finger Flicks (coordination moving from digit flexion to digit extension): WFL    Sensation:  Kadie CAMERON  reports impaired sensation, Sharp impaired R    Temperature: right absent    Balance:   Static Sit - good  Dynamic sit- Good  Static Stand - FAIR-: Maintains without assist but inconsistent   Dynamic stand - FAIR-: Maintains without assist but inconsistent     Endurance Deficit: moderate                    Functional Status      Functional Mobility:  Bed mobility: Mod I  Roll to left: Mod I  Roll to right: Mod I  Supine to sit: Mod I  Sit to supine: Mod I  Transfers to bed: Mod I  Transfers to toilet: Mod I  Car transfers: Mod I  Wheelchair mobility: n/a    ADL's:  Feeding: Mod I,   Grooming: Mod I  Hygiene: Mod I  UB Dressing: Mod I  LB Dressing: Mod I  Toileting: Mod I  Bathing: Mod I    IADL's:  Homecare: Mod I  Cooking: Mod I but having to slide pots around because walking with them is too difficult  Laundry: Mod I  Yard work: n/a  Use of telephone: Mod I  Money management: Mod I  Medication management: Mod I  Handwriting:Mod I  Technology Use:Mod I    Comments:      CMS Impairment/Limitation/Restriction for FOTO Neuro Survey    FOTO not completed         Treatment       Home Exercises and Patient Education Provided    Education provided:   -role of OT, goals for OT, scheduling/cancellations, insurance limitations with patient.  -Additional Education provided: n/a    Assessment     Kadie Mccann is a 31 y.o. female referred to outpatient occupational therapy and presents with a medical diagnosis of NF1, resulting in decreased work ability and visual deficits and demonstrates limitations as described in the chart below. Following medical record review it is determined that patient will benefit from occupational therapy services in order to maximize pain free and/or  functional use of herself.    Patient prognosis is Fair due to  diagnosis  Patient will benefit from skilled outpatient Occupational Therapy to address the deficits stated above and in the chart below, provide patient/family education, and to maximize patient's level of independence.     Plan of care discussed with patient: Yes  Patient's spiritual, cultural and educational needs considered and patient is agreeable to the plan of care and goals as stated below:     Anticipated Barriers for therapy: understanding of therapy limiattions    Medical Necessity is demonstrated by the following  Profile and History Assessment of Occupational Performance Level of Clinical Decision Making Complexity Score   Occupational Profile:   Kadie Mccann is a 31 y.o. female who lives with their family and is currently employed as flower arranger. Kadie Mccann has difficulty with  Visual deficits and ADL/IADL safety  affecting his/her daily functional abilities. His/her main goal for therapy is walk in heels again.     Comorbidities:   see chart    Medical and Therapy History Review:   Brief               Performance Deficits    Physical:  Visual Functions  Pain    Cognitive:  Safety Awareness/Insight to Disability    Psychosocial:    Family Support  (does not believe in seeing a psychiatrist despite suicide attempts)     Clinical Decision Making:  low    Assessment Process:  Problem-Focused Assessments    Modification/Need for Assistance:  Not Necessary    Intervention Selection:  Limited Treatment Options       low  Based on PMHX, co morbidities , data from assessments and functional level of assistance required with task and clinical presentation directly impacting function.       The following goals were discussed with the patient and patient is in agreement with them as to be addressed in the treatment plan.     Goals:  Short Term Goals = Long Term Goals: 6 weeks   - Pt. Will complete fine motor testing and goals to be  added as appropriate  - Pt. Will be educated safe driving route to get to and from school  - Pt. Will be educated in proper bathroom safety equipment to reduce fall risk  - Pt. Will utilize adaptive equipment for increased independence and safety while cooking  - Pt. Will re-enroll in her GED courses  - Pt. Will be independent with oculomotor HEP (saccades and smooth pursuits)   - Pt. Will have [facility] scheduled     Plan   Certification Period/Plan of care expiration: 4/27/2022 to 6/10/22.    Outpatient Occupational Therapy 1 times weekly for 6 weeks to include the following interventions: Neuromuscular Re-ed, Patient Education, Self Care, Therapeutic Activities and Therapeutic Exercise.    CHU Candelaria      I certify the need for these services furnished under this plan of treatment and while under my care.  ____________________________________ Physician/Referring Practitioner   Date of Signature

## 2022-04-28 ENCOUNTER — TELEPHONE (OUTPATIENT)
Dept: NEUROSURGERY | Facility: CLINIC | Age: 32
End: 2022-04-28
Payer: MEDICAID

## 2022-04-28 DIAGNOSIS — Q85.01 NEUROFIBROMATOSIS, TYPE 1: Primary | ICD-10-CM

## 2022-05-16 ENCOUNTER — OFFICE VISIT (OUTPATIENT)
Dept: NEUROSURGERY | Facility: CLINIC | Age: 32
End: 2022-05-16
Payer: MEDICAID

## 2022-05-16 ENCOUNTER — HOSPITAL ENCOUNTER (OUTPATIENT)
Dept: RADIOLOGY | Facility: HOSPITAL | Age: 32
Discharge: HOME | End: 2022-05-16
Attending: NEUROLOGICAL SURGERY
Payer: MEDICAID

## 2022-05-16 VITALS
HEIGHT: 60 IN | BODY MASS INDEX: 32.98 KG/M2 | WEIGHT: 168 LBS | SYSTOLIC BLOOD PRESSURE: 125 MMHG | DIASTOLIC BLOOD PRESSURE: 82 MMHG | HEART RATE: 83 BPM

## 2022-05-16 DIAGNOSIS — D36.13 NEUROFIBROMA OF LOWER EXTREMITY: Primary | ICD-10-CM

## 2022-05-16 DIAGNOSIS — Q85.01 NEUROFIBROMATOSIS, PERIPHERAL, NF1: ICD-10-CM

## 2022-05-16 PROCEDURE — 3008F BODY MASS INDEX DOCD: CPT | Mod: CPTII,,, | Performed by: NEUROLOGICAL SURGERY

## 2022-05-16 PROCEDURE — 1159F PR MEDICATION LIST DOCUMENTED IN MEDICAL RECORD: ICD-10-PCS | Mod: CPTII,,, | Performed by: NEUROLOGICAL SURGERY

## 2022-05-16 PROCEDURE — 73720 MRI LWR EXTREMITY W/O&W/DYE: CPT | Mod: TC,LT

## 2022-05-16 PROCEDURE — 3074F PR MOST RECENT SYSTOLIC BLOOD PRESSURE < 130 MM HG: ICD-10-PCS | Mod: CPTII,,, | Performed by: NEUROLOGICAL SURGERY

## 2022-05-16 PROCEDURE — 1160F RVW MEDS BY RX/DR IN RCRD: CPT | Mod: CPTII,,, | Performed by: NEUROLOGICAL SURGERY

## 2022-05-16 PROCEDURE — 99213 PR OFFICE/OUTPT VISIT, EST, LEVL III, 20-29 MIN: ICD-10-PCS | Mod: S$PBB,,, | Performed by: NEUROLOGICAL SURGERY

## 2022-05-16 PROCEDURE — 99213 OFFICE O/P EST LOW 20 MIN: CPT | Mod: PBBFAC,25 | Performed by: NEUROLOGICAL SURGERY

## 2022-05-16 PROCEDURE — 99999 PR PBB SHADOW E&M-EST. PATIENT-LVL III: ICD-10-PCS | Mod: PBBFAC,,, | Performed by: NEUROLOGICAL SURGERY

## 2022-05-16 PROCEDURE — 1159F MED LIST DOCD IN RCRD: CPT | Mod: CPTII,,, | Performed by: NEUROLOGICAL SURGERY

## 2022-05-16 PROCEDURE — 99213 OFFICE O/P EST LOW 20 MIN: CPT | Mod: S$PBB,,, | Performed by: NEUROLOGICAL SURGERY

## 2022-05-16 PROCEDURE — 73720 MRI TIBIA FIBULA W WO CONTRAST LEFT: ICD-10-PCS | Mod: 26,LT,, | Performed by: RADIOLOGY

## 2022-05-16 PROCEDURE — 3079F DIAST BP 80-89 MM HG: CPT | Mod: CPTII,,, | Performed by: NEUROLOGICAL SURGERY

## 2022-05-16 PROCEDURE — 25500020 PHARM REV CODE 255: Performed by: NEUROLOGICAL SURGERY

## 2022-05-16 PROCEDURE — 1160F PR REVIEW ALL MEDS BY PRESCRIBER/CLIN PHARMACIST DOCUMENTED: ICD-10-PCS | Mod: CPTII,,, | Performed by: NEUROLOGICAL SURGERY

## 2022-05-16 PROCEDURE — 3008F PR BODY MASS INDEX (BMI) DOCUMENTED: ICD-10-PCS | Mod: CPTII,,, | Performed by: NEUROLOGICAL SURGERY

## 2022-05-16 PROCEDURE — 3079F PR MOST RECENT DIASTOLIC BLOOD PRESSURE 80-89 MM HG: ICD-10-PCS | Mod: CPTII,,, | Performed by: NEUROLOGICAL SURGERY

## 2022-05-16 PROCEDURE — 99999 PR PBB SHADOW E&M-EST. PATIENT-LVL III: CPT | Mod: PBBFAC,,, | Performed by: NEUROLOGICAL SURGERY

## 2022-05-16 PROCEDURE — 73720 MRI LWR EXTREMITY W/O&W/DYE: CPT | Mod: 26,LT,, | Performed by: RADIOLOGY

## 2022-05-16 PROCEDURE — A9585 GADOBUTROL INJECTION: HCPCS | Performed by: NEUROLOGICAL SURGERY

## 2022-05-16 PROCEDURE — 3074F SYST BP LT 130 MM HG: CPT | Mod: CPTII,,, | Performed by: NEUROLOGICAL SURGERY

## 2022-05-16 RX ORDER — GADOBUTROL 604.72 MG/ML
8 INJECTION INTRAVENOUS
Status: COMPLETED | OUTPATIENT
Start: 2022-05-16 | End: 2022-05-16

## 2022-05-16 RX ADMIN — GADOBUTROL 8 ML: 604.72 INJECTION INTRAVENOUS at 12:05

## 2022-05-16 NOTE — PROGRESS NOTES
angeline Mccann returned in neurosurgical follow-up to the office today.  MRI of the lower left leg was done at [facility] today.  This was compared to an MRI of her leg done at [facility] on 3/14/2011. There has been progression of the soft tissue mass in the medial lower leg which shows increased signal on T2 and STIR and avid enhancement.  The edges of the tumor are somewhat irregular and jagged.    The lesion is only mildly tender but is associated with considerable leg swelling when she has been up for the day.  I will have EMG of the lower extremity done to see what nerve involvement is present and we can consider if resection is feasible.  I will plan to see her back when the EMG is done.

## 2022-06-27 ENCOUNTER — TELEPHONE (OUTPATIENT)
Dept: NEUROLOGY | Facility: CLINIC | Age: 32
End: 2022-06-27
Payer: MEDICAID

## 2022-06-27 NOTE — TELEPHONE ENCOUNTER
Returned Ms. Mccann's call requesting that her EMG Procedure be rescheduled. She informed me that she was sent away after driving here because of being 15 minutes late. I informed patient of the importance of being on time, 30 minutes prior to her scheduled Procedure.  We discussed at length the importance of arriving and being checked in and ready for her procedure 30 minutes prior to her scheduled time.  Patient expressed understanding.

## 2022-08-19 ENCOUNTER — HOSPITAL ENCOUNTER (EMERGENCY)
Facility: HOSPITAL | Age: 32
Discharge: HOME | End: 2022-08-19
Attending: EMERGENCY MEDICINE
Payer: MEDICAID

## 2022-08-19 VITALS
TEMPERATURE: 98 F | HEART RATE: 91 BPM | DIASTOLIC BLOOD PRESSURE: 79 MMHG | OXYGEN SATURATION: 99 % | RESPIRATION RATE: 18 BRPM | SYSTOLIC BLOOD PRESSURE: 128 MMHG

## 2022-08-19 DIAGNOSIS — U07.1 COVID-19: Primary | ICD-10-CM

## 2022-08-19 LAB
CTP QC/QA: YES
SARS-COV-2 RDRP RESP QL NAA+PROBE: POSITIVE

## 2022-08-19 PROCEDURE — U0002 COVID-19 LAB TEST NON-CDC: HCPCS | Performed by: NURSE PRACTITIONER

## 2022-08-19 PROCEDURE — 99283 EMERGENCY DEPT VISIT LOW MDM: CPT

## 2022-08-19 RX ORDER — BENZONATATE 100 MG/1
100 CAPSULE ORAL 3 TIMES DAILY PRN
Qty: 20 CAPSULE | Refills: 0 | Status: SHIPPED | OUTPATIENT
Start: 2022-08-19 | End: 2022-08-29

## 2022-08-19 NOTE — DISCHARGE INSTRUCTIONS

## 2022-08-19 NOTE — Clinical Note
"Kadie Mccann ([place]) was seen and treated in our emergency department on 8/19/2022.     COVID-19 is present in our communities across the state. There is limited testing for COVID at this time, so not all patients can be tested. In this situation, your employee meets the following criteria:    Kadie Mccann has met the criteria for COVID-19 testing and has a POSITIVE result. She can return to work once they are asymptomatic for 24 hours without the use of fever reducing medications AND at least five days from the first positive result. A mask is recommended for 5 days post quarantine.     If you have any questions or concerns, or if I can be of further assistance, please do not hesitate to contact me.    Sincerely,             Vika Birmingham MD"

## 2022-08-19 NOTE — ED NOTES
Pt presents to the ED requesting covid test due to exposure while in [employer] for 5 days. Patient stated that she started with sore throat and BA and weakness.      APPEARANCE: Alert, oriented and in no acute distress.  HEENT: Speaks without hoarseness. +sore throat  CARDIAC: Normal rate and rhythm.    PERIPHERAL VASCULAR: peripheral pulses present. Normal cap refill. No edema. Warm to touch.    RESPIRATORY:Normal rate and effort. Respirations are equal and unlabored no obvious signs of distress.  GASTRO: soft, nondistended, nontender. Denies nausea, vomiting, or diarrhea.  : voids spontaneously and without difficulty.   MUSC: + generalized weakness and BA. Full ROM. No obvious deformity. Ambulatory with a steady gait  SKIN: Skin is warm and dry, without discoloration. Mucous membranes moist.  NEURO: Pt is awake, alert, aware of environment. No neurologic deficits noted.

## 2022-08-19 NOTE — ED PROVIDER NOTES
"Encounter Date: 2022       History     Chief Complaint   Patient presents with    COVID-19 Concerns     Pt complains of sore throat that began on tues, + nasal congestion denies fever, nausea, emesis, pt states she was released from prison on tues evening, and had to drink sink water, and concerned she " caught something" from prison      31 yr old female presents to the ER with sore throat, cough and congestion that began a few days ago. No nausea, vomiting or diarrhea. No rash or neck stiffness. PMH of fibromatosis. Pt states she was in prison and concerned she exposed to those who were sick. No chest pain or sob    The history is provided by the patient. No  was used.     Review of patient's allergies indicates:   Allergen Reactions    Penicillins      Past Medical History:   Diagnosis Date    Fibromatosis      Past Surgical History:   Procedure Laterality Date    brain biospy  2016     SECTION      leg biopsy       Family History   Problem Relation Age of Onset    Mental illness Sister         bipolar    Learning disabilities Son         ADHD     Social History     Tobacco Use    Smoking status: Never Smoker    Smokeless tobacco: Never Used   Substance Use Topics    Alcohol use: Yes     Alcohol/week: 1.0 - 2.0 standard drink     Types: 1 - 2 Glasses of wine per week    Drug use: No     Review of Systems   Constitutional: Negative for fever.   HENT: Positive for congestion.    Respiratory: Positive for cough.    Gastrointestinal: Negative for diarrhea, nausea and vomiting.   Genitourinary: Negative for dysuria.   Musculoskeletal: Negative for neck pain and neck stiffness.   Skin: Negative for rash and wound.   Neurological: Negative.    Hematological: Negative.    Psychiatric/Behavioral: Negative.        Physical Exam     Initial Vitals [22 1626]   BP Pulse Resp Temp SpO2   128/79 91 18 98.3 °F (36.8 °C) 99 %      MAP       --         Physical " Exam    Constitutional: She appears well-developed and well-nourished.   HENT:   Head: Normocephalic.   Right Ear: Hearing and tympanic membrane normal.   Left Ear: Hearing and tympanic membrane normal.   Nose: Nose normal.   Mouth/Throat: Oropharynx is clear and moist.   Eyes: Lids are normal. Pupils are equal, round, and reactive to light.   Neck:   Normal range of motion.  Cardiovascular: Normal rate.   Pulmonary/Chest: Breath sounds normal. No respiratory distress. She has no wheezes. She has no rhonchi.   Abdominal: Abdomen is soft. There is no abdominal tenderness.   Musculoskeletal:         General: Normal range of motion.      Cervical back: Normal range of motion. No rigidity.     Neurological: She is alert and oriented to person, place, and time.   Skin: Skin is warm and dry. No rash noted.   Psychiatric: She has a normal mood and affect. Her behavior is normal. Judgment and thought content normal.         ED Course   Procedures  Labs Reviewed   SARS-COV-2 RDRP GENE - Abnormal; Notable for the following components:       Result Value    POC Rapid COVID Positive (*)     All other components within normal limits    Narrative:     This test utilizes isothermal nucleic acid amplification   technology to detect the SARS-CoV-2 RdRp nucleic acid segment.   The analytical sensitivity (limit of detection) is 125 genome   equivalents/mL.   A POSITIVE result implies infection with the SARS-CoV-2 virus;   the patient is presumed to be contagious.     A NEGATIVE result means that SARS-CoV-2 nucleic acids are not   present above the limit of detection. A NEGATIVE result should be   treated as presumptive. It does not rule out the possibility of   COVID-19 and should not be the sole basis for treatment decisions.   If COVID-19 is strongly suspected based on clinical and exposure   history, re-testing using an alternate molecular assay should be   considered.   This test is only for use under the Food and Drug    Administration s Emergency Use Authorization (EUA).   Commercial kits are provided by [employer].   Performance characteristics of the EUA have been independently   verified by [facility] Department of   Pathology and Laboratory Medicine.   _________________________________________________________________   The authorized Fact Sheet for Healthcare Providers and the authorized Fact   Sheet for Patients of the ID NOW COVID-19 are available on the FDA   website:     https://www.fda.gov/media/004009/download  https://www.fda.gov/media/625107/download                  Imaging Results    None          Medications - No data to display  Medical Decision Making:   Clinical Tests:   Lab Tests: Ordered and Reviewed             ED Course as of 08/19/22 1703   Fri Aug 19, 2022   1700 Pt notified of the need for follow up care with pcp and the covid home monitoring program. Pt is not toxic in appearance and stable for dc.  [DT]      ED Course User Index  [DT] Alisa Wong NP             Clinical Impression:   Final diagnoses:  [U07.1] COVID-19 (Primary)          ED Disposition Condition    Discharge Stable        ED Prescriptions     Medication Sig Dispense Start Date End Date Auth. Provider    benzonatate (TESSALON) 100 MG capsule Take 1 capsule (100 mg total) by mouth 3 (three) times daily as needed for Cough. 20 capsule 8/19/2022 8/29/2022 Alisa Wong NP        Follow-up Information     Follow up With Specialties Details Why Contact Info    Segun Reddy MD Family Medicine Schedule an appointment as soon as possible for a visit in 2 days  [address] [facility] [address]  [phone]             Alisa Wong NP  08/19/22 1703

## 2022-09-02 ENCOUNTER — PROCEDURE VISIT (OUTPATIENT)
Dept: NEUROLOGY | Facility: CLINIC | Age: 32
End: 2022-09-02
Payer: MEDICAID

## 2022-09-02 DIAGNOSIS — D36.13 NEUROFIBROMA OF LOWER EXTREMITY: ICD-10-CM

## 2022-09-02 PROCEDURE — 95910 NRV CNDJ TEST 7-8 STUDIES: CPT | Mod: 26,S$PBB,, | Performed by: PSYCHIATRY & NEUROLOGY

## 2022-09-02 PROCEDURE — 95910 PR NERVE CONDUCTION STUDY; 7-8 STUDIES: ICD-10-PCS | Mod: 26,S$PBB,, | Performed by: PSYCHIATRY & NEUROLOGY

## 2022-09-02 PROCEDURE — 95910 NRV CNDJ TEST 7-8 STUDIES: CPT | Mod: PBBFAC | Performed by: PSYCHIATRY & NEUROLOGY

## 2022-09-02 PROCEDURE — 95886 PR EMG COMPLETE, W/ NERVE CONDUCTION STUDIES, 5+ MUSCLES: ICD-10-PCS | Mod: 26,S$PBB,, | Performed by: PSYCHIATRY & NEUROLOGY

## 2022-09-02 PROCEDURE — 95886 MUSC TEST DONE W/N TEST COMP: CPT | Mod: 26,S$PBB,, | Performed by: PSYCHIATRY & NEUROLOGY

## 2022-09-02 PROCEDURE — 95886 MUSC TEST DONE W/N TEST COMP: CPT | Mod: PBBFAC | Performed by: PSYCHIATRY & NEUROLOGY

## 2022-09-19 ENCOUNTER — OFFICE VISIT (OUTPATIENT)
Dept: FAMILY MEDICINE | Facility: HOSPITAL | Age: 32
End: 2022-09-19
Payer: MEDICAID

## 2022-09-19 VITALS
BODY MASS INDEX: 32.86 KG/M2 | HEART RATE: 79 BPM | SYSTOLIC BLOOD PRESSURE: 121 MMHG | HEIGHT: 57 IN | WEIGHT: 152.31 LBS | DIASTOLIC BLOOD PRESSURE: 82 MMHG

## 2022-09-19 DIAGNOSIS — N39.0 URINARY TRACT INFECTION WITHOUT HEMATURIA, SITE UNSPECIFIED: ICD-10-CM

## 2022-09-19 DIAGNOSIS — Z13.220 LIPID SCREENING: ICD-10-CM

## 2022-09-19 DIAGNOSIS — Z13.1 DIABETES MELLITUS SCREENING: Primary | ICD-10-CM

## 2022-09-19 PROCEDURE — 99213 OFFICE O/P EST LOW 20 MIN: CPT

## 2022-09-19 NOTE — PROGRESS NOTES
[facility] Family [facility]  History & Physical    SUBJECTIVE:     Chief Complaint:   Chief Complaint   Patient presents with    Numbness     Right hand    Polyuria     Over 6 months-worsening       History of Present Illness:  32 y.o. female who  has a past medical history of Fibromatosis. presents to clinic today for follow up.  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     Diabetes evaluation  Patient with increased urination for the past couple of months. Patient denies excessive thirst. Patient also denies drinking more water lately. Patient denies dysuria, odor, and discharge symptoms. Patient states she has never had an elevated A1C in the past but wants to makes sure se does not have diabetes. Patient with family history of diabetes in both her father's side and her mother's side.       Allergies:  Review of patient's allergies indicates:   Allergen Reactions    Penicillins        Home Medications:  Current Outpatient Medications on File Prior to Visit   Medication Sig    ALPRAZolam (XANAX) 1 MG tablet Take 1 tablet by mouth 45 minutes prior to MRI, take the 2nd tablet by mouth prior to MRI if needed. (Patient not taking: Reported on 2022)    levonorgestrel-ethinyl estradiol (TRIVORA, 28,) 50-30 (6)/75-40 (5)/125-30(10) per tablet Take 1 tablet by mouth once daily.     No current facility-administered medications on file prior to visit.       Past Medical History:   Diagnosis Date    Fibromatosis      Past Surgical History:   Procedure Laterality Date    brain biospy  2016     SECTION      leg biopsy       Family History   Problem Relation Age of Onset    Mental illness Sister         bipolar    Learning disabilities Son         ADHD     Social History     Tobacco Use    Smoking status: Some Days    Smokeless tobacco: Never    Tobacco comments:      Occ marijuana   Substance Use Topics    Alcohol use: Yes     Alcohol/week: 1.0 - 2.0 standard drink     Types: 1 - 2 Glasses of wine per week    Drug use: No        Review of Systems   Constitutional: Negative.  Negative for chills and fever.   HENT: Negative.     Eyes: Negative.    Respiratory:  Negative for cough and shortness of breath.    Cardiovascular: Negative.  Negative for chest pain and palpitations.   Gastrointestinal: Negative.  Negative for abdominal pain.   Genitourinary:  Positive for frequency. Negative for dysuria, flank pain and hematuria.   Skin: Negative.    Neurological: Negative.    Endo/Heme/Allergies: Negative.    Psychiatric/Behavioral: Negative.        OBJECTIVE:     Vital Signs:  Pulse: 79 (09/19/22 1003)  BP: 121/82 (09/19/22 1003)    Physical Exam  Constitutional:       Appearance: She is well-developed.   HENT:      Head: Normocephalic and atraumatic.   Eyes:      Pupils: Pupils are equal, round, and reactive to light.   Cardiovascular:      Rate and Rhythm: Normal rate and regular rhythm.      Heart sounds: Normal heart sounds. No murmur heard.    No friction rub. No gallop.   Pulmonary:      Effort: Pulmonary effort is normal.      Breath sounds: Normal breath sounds. No wheezing or rales.   Abdominal:      General: Bowel sounds are normal. There is no distension.      Palpations: Abdomen is soft.      Tenderness: There is no abdominal tenderness.   Musculoskeletal:         General: Normal range of motion.      Cervical back: Normal range of motion and neck supple.   Skin:     General: Skin is warm and dry.      Capillary Refill: Capillary refill takes less than 2 seconds.   Neurological:      General: No focal deficit present.      Mental Status: She is alert and oriented to person, place, and time.       Laboratory:  Hemoglobin A1C   Date Value Ref Range Status   09/19/2022 4.8 4.0 - 5.6 % Final     Comment:     ADA Screening Guidelines:  5.7-6.4%  Consistent with  prediabetes  >or=6.5%  Consistent with diabetes    High levels of fetal hemoglobin interfere with the HbA1C  assay. Heterozygous hemoglobin variants (HbS, HgC, etc)do  not significantly interfere with this assay.   However, presence of multiple variants may affect accuracy.     08/03/2021 4.9 4.0 - 5.6 % Final     Comment:     ADA Screening Guidelines:  5.7-6.4%  Consistent with prediabetes  >or=6.5%  Consistent with diabetes    High levels of fetal hemoglobin interfere with the HbA1C  assay. Heterozygous hemoglobin variants (HbS, HgC, etc)do  not significantly interfere with this assay.   However, presence of multiple variants may affect accuracy.     07/07/2020 5.1 4.0 - 5.6 % Final     Comment:     ADA Screening Guidelines:  5.7-6.4%  Consistent with prediabetes  >or=6.5%  Consistent with diabetes  High levels of fetal hemoglobin interfere with the HbA1C  assay. Heterozygous hemoglobin variants (HbS, HgC, etc)do  not significantly interfere with this assay.   However, presence of multiple variants may affect accuracy.         A/P:  Kadie CAMERON was seen today for numbness and polyuria.    Diagnoses and all orders for this visit:    Diabetes mellitus screening  -     Hemoglobin A1C; Future  -     CBC Auto Differential; Future  -     Comprehensive Metabolic Panel; Future    Urinary tract infection without hematuria, site unspecified  -     Urinalysis, Reflex to Urine Culture Urine, Clean Catch; Future    Lipid screening  -     Lipid Panel; Future    Will screen patient for diabetes today and obtain updated lab work. Unlikely to be UTI due to chronicity of symptoms but will still obtain UA to officially rule out. If increased urination still persists at next follow up can consider more detailed urine studies.     Follow up in 2-3 months    Gerard Steele MD PGY-2  [facility]       This note was partially created using [employer] Voice Recognition software. Typographical and content errors may occur with this  process. While efforts are made to detect and correct such errors, in some cases errors will persist. For this reason, wording in this document should be considered in the proper context and not strictly verbatim

## 2022-11-21 ENCOUNTER — OFFICE VISIT (OUTPATIENT)
Dept: NEUROSURGERY | Facility: CLINIC | Age: 32
End: 2022-11-21
Payer: MEDICAID

## 2022-11-21 VITALS
HEART RATE: 91 BPM | WEIGHT: 152 LBS | BODY MASS INDEX: 32.79 KG/M2 | HEIGHT: 57 IN | DIASTOLIC BLOOD PRESSURE: 75 MMHG | SYSTOLIC BLOOD PRESSURE: 131 MMHG

## 2022-11-21 DIAGNOSIS — D36.13 NEUROFIBROMA OF LOWER EXTREMITY: Primary | ICD-10-CM

## 2022-11-21 PROCEDURE — 1160F RVW MEDS BY RX/DR IN RCRD: CPT | Mod: CPTII,,, | Performed by: NEUROLOGICAL SURGERY

## 2022-11-21 PROCEDURE — 3044F PR MOST RECENT HEMOGLOBIN A1C LEVEL <7.0%: ICD-10-PCS | Mod: CPTII,,, | Performed by: NEUROLOGICAL SURGERY

## 2022-11-21 PROCEDURE — 3078F PR MOST RECENT DIASTOLIC BLOOD PRESSURE < 80 MM HG: ICD-10-PCS | Mod: CPTII,,, | Performed by: NEUROLOGICAL SURGERY

## 2022-11-21 PROCEDURE — 99999 PR PBB SHADOW E&M-EST. PATIENT-LVL III: CPT | Mod: PBBFAC,,, | Performed by: NEUROLOGICAL SURGERY

## 2022-11-21 PROCEDURE — 3075F PR MOST RECENT SYSTOLIC BLOOD PRESS GE 130-139MM HG: ICD-10-PCS | Mod: CPTII,,, | Performed by: NEUROLOGICAL SURGERY

## 2022-11-21 PROCEDURE — 1159F MED LIST DOCD IN RCRD: CPT | Mod: CPTII,,, | Performed by: NEUROLOGICAL SURGERY

## 2022-11-21 PROCEDURE — 3044F HG A1C LEVEL LT 7.0%: CPT | Mod: CPTII,,, | Performed by: NEUROLOGICAL SURGERY

## 2022-11-21 PROCEDURE — 99999 PR PBB SHADOW E&M-EST. PATIENT-LVL III: ICD-10-PCS | Mod: PBBFAC,,, | Performed by: NEUROLOGICAL SURGERY

## 2022-11-21 PROCEDURE — 99213 PR OFFICE/OUTPT VISIT, EST, LEVL III, 20-29 MIN: ICD-10-PCS | Mod: S$PBB,,, | Performed by: NEUROLOGICAL SURGERY

## 2022-11-21 PROCEDURE — 3008F BODY MASS INDEX DOCD: CPT | Mod: CPTII,,, | Performed by: NEUROLOGICAL SURGERY

## 2022-11-21 PROCEDURE — 1159F PR MEDICATION LIST DOCUMENTED IN MEDICAL RECORD: ICD-10-PCS | Mod: CPTII,,, | Performed by: NEUROLOGICAL SURGERY

## 2022-11-21 PROCEDURE — 3008F PR BODY MASS INDEX (BMI) DOCUMENTED: ICD-10-PCS | Mod: CPTII,,, | Performed by: NEUROLOGICAL SURGERY

## 2022-11-21 PROCEDURE — 99213 OFFICE O/P EST LOW 20 MIN: CPT | Mod: S$PBB,,, | Performed by: NEUROLOGICAL SURGERY

## 2022-11-21 PROCEDURE — 1160F PR REVIEW ALL MEDS BY PRESCRIBER/CLIN PHARMACIST DOCUMENTED: ICD-10-PCS | Mod: CPTII,,, | Performed by: NEUROLOGICAL SURGERY

## 2022-11-21 PROCEDURE — 3078F DIAST BP <80 MM HG: CPT | Mod: CPTII,,, | Performed by: NEUROLOGICAL SURGERY

## 2022-11-21 PROCEDURE — 99213 OFFICE O/P EST LOW 20 MIN: CPT | Mod: PBBFAC | Performed by: NEUROLOGICAL SURGERY

## 2022-11-21 PROCEDURE — 3075F SYST BP GE 130 - 139MM HG: CPT | Mod: CPTII,,, | Performed by: NEUROLOGICAL SURGERY

## 2022-11-21 NOTE — PROGRESS NOTES
Kadie greenberg was seen in neurosurgical follow-up at the office this afternoon.  She continues to complain of pain in her left lower leg particularly when she has been standing for a period of time.  She has noted no clear weakness in the leg.  She is now working and has gone back to finish her GED and seems generally optimistic.  She is anxious to have something done with the leg as this is troublesome for her.    On brief examination today she is alert and cooperative with chronic mild speech hesitation.  The left lower extremity shows fullness on the anteromedial side of the tibia.  This is not tender or inflamed.  She is able to elevate and depressed the foot.      EMG was done by Dr. Uribe on 09/02/2022.  There is normal conduction in the left peroneal and tibial nerves.  A left sural nerve response could not be obtained but it was felt this might be a technical issue.  There was no evidence for denervation of the tested muscles.  MRI done on 05/16/2022 shows the tumor in the anteromedial compartment of the lower extremity.  When compared to her previous study of 03/11/2011 done at [facility] there has been considerable increase in the size of the lesion.    After discussion with her the tumor is bothering her enough that she would like to proceed with resection.  I will make arrangements to get this done in the near future.

## 2023-02-02 ENCOUNTER — TELEPHONE (OUTPATIENT)
Dept: NEUROSURGERY | Facility: CLINIC | Age: 33
End: 2023-02-02
Payer: MEDICAID

## 2023-02-02 DIAGNOSIS — D36.13 NEUROFIBROMA OF LOWER EXTREMITY: Primary | ICD-10-CM

## 2023-02-20 ENCOUNTER — PATIENT MESSAGE (OUTPATIENT)
Dept: NEUROSURGERY | Facility: CLINIC | Age: 33
End: 2023-02-20
Payer: MEDICAID

## 2023-02-23 ENCOUNTER — OFFICE VISIT (OUTPATIENT)
Dept: FAMILY MEDICINE | Facility: HOSPITAL | Age: 33
End: 2023-02-23
Payer: MEDICAID

## 2023-02-23 VITALS
WEIGHT: 156.31 LBS | HEIGHT: 57 IN | DIASTOLIC BLOOD PRESSURE: 74 MMHG | HEART RATE: 94 BPM | SYSTOLIC BLOOD PRESSURE: 127 MMHG | BODY MASS INDEX: 33.72 KG/M2

## 2023-02-23 DIAGNOSIS — F41.9 ANXIETY: Primary | ICD-10-CM

## 2023-02-23 PROCEDURE — 99213 OFFICE O/P EST LOW 20 MIN: CPT

## 2023-02-23 NOTE — PROGRESS NOTES
[facility] Family Medicine  History & Physical    SUBJECTIVE:     Chief Complaint:   Chief Complaint   Patient presents with    Urinary Frequency    Leg Pain       History of Present Illness:  32 y.o. female who  has a past medical history of Fibromatosis. presents to clinic today for leg pain and urinary frequency.    #Diabetes evaluation  Patient with increased urination for the past couple of months. Patient denies excessive thirst. Patient also denies drinking more water lately. Patient denies dysuria, odor, and discharge symptoms. A1c obtained at last visit showing 4.8. Patient with family history of diabetes in both her father's side and her mother's side. Patient states that her increase in urination may have been related with increased fluid intake. Denies hematuria.     #Focus issues  Patient started school in October. Patient noticing that it is been hard to focus in school. Patient able to pass classes so far. Patient in school for obtain her high school diploma. Patient with requesting psychotherapy.       Allergies:  Review of patient's allergies indicates:   Allergen Reactions    Penicillins        Home Medications:  Current Outpatient Medications on File Prior to Visit   Medication Sig    [DISCONTINUED] ALPRAZolam (XANAX) 1 MG tablet Take 1 tablet by mouth 45 minutes prior to MRI, take the 2nd tablet by mouth prior to MRI if needed. (Patient not taking: Reported on 2022)    [DISCONTINUED] levonorgestrel-ethinyl estradiol (TRIVORA, 28,) 50-30 (6)/75-40 (5)/125-30(10) per tablet Take 1 tablet by mouth once daily.     No current facility-administered medications on file prior to visit.       Past Medical History:   Diagnosis Date    Fibromatosis      Past Surgical History:   Procedure Laterality Date    brain biospy  2016     SECTION      leg biopsy       Family History   Problem Relation Age of Onset    Mental illness Sister         bipolar    Learning disabilities Son         ADHD     Social  History     Tobacco Use    Smoking status: Some Days    Smokeless tobacco: Never    Tobacco comments:     Occ marijuana   Substance Use Topics    Alcohol use: Yes     Alcohol/week: 1.0 - 2.0 standard drink     Types: 1 - 2 Glasses of wine per week    Drug use: No             OBJECTIVE:     Vital Signs:  Pulse: 94 (02/23/23 1457)  BP: 127/74 (02/23/23 1457)    Physical Exam  Constitutional:       Appearance: She is well-developed.   HENT:      Head: Normocephalic and atraumatic.   Eyes:      Pupils: Pupils are equal, round, and reactive to light.   Cardiovascular:      Rate and Rhythm: Normal rate and regular rhythm.      Heart sounds: Normal heart sounds. No murmur heard.    No friction rub. No gallop.   Pulmonary:      Effort: Pulmonary effort is normal.      Breath sounds: Normal breath sounds. No wheezing or rales.   Abdominal:      General: Bowel sounds are normal. There is no distension.      Palpations: Abdomen is soft.      Tenderness: There is no abdominal tenderness.   Musculoskeletal:         General: Normal range of motion.      Cervical back: Normal range of motion and neck supple.   Skin:     General: Skin is warm and dry.      Capillary Refill: Capillary refill takes less than 2 seconds.   Neurological:      General: No focal deficit present.      Mental Status: She is alert and oriented to person, place, and time.       Laboratory:  Hemoglobin A1C   Date Value Ref Range Status   09/19/2022 4.8 4.0 - 5.6 % Final     Comment:     ADA Screening Guidelines:  5.7-6.4%  Consistent with prediabetes  >or=6.5%  Consistent with diabetes    High levels of fetal hemoglobin interfere with the HbA1C  assay. Heterozygous hemoglobin variants (HbS, HgC, etc)do  not significantly interfere with this assay.   However, presence of multiple variants may affect accuracy.     08/03/2021 4.9 4.0 - 5.6 % Final     Comment:     ADA Screening Guidelines:  5.7-6.4%  Consistent with prediabetes  >or=6.5%  Consistent with  diabetes    High levels of fetal hemoglobin interfere with the HbA1C  assay. Heterozygous hemoglobin variants (HbS, HgC, etc)do  not significantly interfere with this assay.   However, presence of multiple variants may affect accuracy.     07/07/2020 5.1 4.0 - 5.6 % Final     Comment:     ADA Screening Guidelines:  5.7-6.4%  Consistent with prediabetes  >or=6.5%  Consistent with diabetes  High levels of fetal hemoglobin interfere with the HbA1C  assay. Heterozygous hemoglobin variants (HbS, HgC, etc)do  not significantly interfere with this assay.   However, presence of multiple variants may affect accuracy.         A/P:  Kadie CAMERON was seen today for urinary frequency and leg pain.    Diagnoses and all orders for this visit:    Anxiety    Patient noting elevated anxiety since starting school and having increased workload managing tasks. Will request referral to staff psychologist today. Patient with continued leg pain likely 2/2 to underlying NF-1 needing excision. [facility] in [facility] to [facility] neurosurgery and awaiting appointment. Will also reach out if appointment has not been set up by next visit. Discussed ways to also help with focus issues.     Follow up in 1-2 months     Gerard Steele MD PGY-2  [facility]       This note was partially created using [employer] Voice Recognition software. Typographical and content errors may occur with this process. While efforts are made to detect and correct such errors, in some cases errors will persist. For this reason, wording in this document should be considered in the proper context and not strictly verbatim

## 2023-03-15 ENCOUNTER — HOSPITAL ENCOUNTER (EMERGENCY)
Facility: HOSPITAL | Age: 33
Discharge: HOME | End: 2023-03-15
Attending: EMERGENCY MEDICINE
Payer: MEDICAID

## 2023-03-15 VITALS
DIASTOLIC BLOOD PRESSURE: 77 MMHG | SYSTOLIC BLOOD PRESSURE: 122 MMHG | HEART RATE: 78 BPM | TEMPERATURE: 99 F | RESPIRATION RATE: 18 BRPM | OXYGEN SATURATION: 97 % | BODY MASS INDEX: 38.95 KG/M2 | WEIGHT: 180 LBS

## 2023-03-15 DIAGNOSIS — M54.50 ACUTE RIGHT-SIDED LOW BACK PAIN WITHOUT SCIATICA: ICD-10-CM
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
## 2023-03-15 DIAGNOSIS — M25.511 ACUTE PAIN OF RIGHT SHOULDER: ICD-10-CM

## 2023-03-15 LAB
B-HCG UR QL: NEGATIVE
CTP QC/QA: YES

## 2023-03-15 PROCEDURE — 25000003 PHARM REV CODE 250: Performed by: STUDENT IN AN ORGANIZED HEALTH CARE EDUCATION/TRAINING PROGRAM

## 2023-03-15 PROCEDURE — 81025 URINE PREGNANCY TEST: CPT | Performed by: EMERGENCY MEDICINE

## 2023-03-15 PROCEDURE — 99284 EMERGENCY DEPT VISIT MOD MDM: CPT

## 2023-03-15 RX ORDER — ACETAMINOPHEN 500 MG
1000 TABLET ORAL
Status: COMPLETED | OUTPATIENT
Start: 2023-03-15 | End: 2023-03-15

## 2023-03-15 RX ORDER — METHOCARBAMOL 500 MG/1
500 TABLET, FILM COATED ORAL 4 TIMES DAILY
Qty: 40 TABLET | Refills: 0 | Status: SHIPPED | OUTPATIENT
Start: 2023-03-15 | End: 2023-03-25

## 2023-03-15 RX ORDER — LIDOCAINE 50 MG/G
1 PATCH TOPICAL DAILY
Qty: 30 PATCH | Refills: 0 | Status: SHIPPED | OUTPATIENT
Start: 2023-03-15 | End: 2023-03-23 | Stop reason: SDUPTHER

## 2023-03-15 RX ADMIN — ACETAMINOPHEN 1000 MG: 500 TABLET ORAL at 09:03

## 2023-03-15 NOTE — ED NOTES
Pt given discharge and follow up instructions by Dr Akhtar. Pt ambulated out of the ER in stable condition with steady gait.

## 2023-03-15 NOTE — DISCHARGE INSTRUCTIONS
Please return to the ED if symtpoms worsen or do not improve. Please follow-up with PCP in 1 week.
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
## 2023-03-23 ENCOUNTER — OFFICE VISIT (OUTPATIENT)
Dept: FAMILY MEDICINE | Facility: HOSPITAL | Age: 33
End: 2023-03-23
Payer: MEDICAID

## 2023-03-23 VITALS
WEIGHT: 157.88 LBS | SYSTOLIC BLOOD PRESSURE: 137 MMHG | DIASTOLIC BLOOD PRESSURE: 91 MMHG | HEIGHT: 57 IN | HEART RATE: 78 BPM | BODY MASS INDEX: 34.06 KG/M2
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
## 2023-03-23 DIAGNOSIS — M54.50 ACUTE RIGHT-SIDED LOW BACK PAIN WITHOUT SCIATICA: Primary | ICD-10-CM

## 2023-03-23 PROCEDURE — 99213 OFFICE O/P EST LOW 20 MIN: CPT | Performed by: STUDENT IN AN ORGANIZED HEALTH CARE EDUCATION/TRAINING PROGRAM

## 2023-03-23 RX ORDER — LIDOCAINE 50 MG/G
1 PATCH TOPICAL DAILY
Qty: 30 PATCH | Refills: 2 | Status: SHIPPED | OUTPATIENT
Start: 2023-03-23 | End: 2023-06-21
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
## 2023-12-04 ENCOUNTER — OFFICE VISIT (OUTPATIENT)
Dept: NEUROSURGERY | Facility: CLINIC | Age: 33
End: 2023-12-04
Payer: MEDICAID

## 2023-12-04 VITALS — WEIGHT: 157 LBS | BODY MASS INDEX: 33.87 KG/M2 | HEIGHT: 57 IN

## 2023-12-04 DIAGNOSIS — D36.13 NEUROFIBROMA OF LOWER EXTREMITY: Primary | ICD-10-CM

## 2023-12-04 DIAGNOSIS — Q85.00 NEUROFIBROMATOSIS: ICD-10-CM

## 2023-12-04 PROCEDURE — 99213 PR OFFICE/OUTPT VISIT, EST, LEVL III, 20-29 MIN: ICD-10-PCS | Mod: S$PBB,,, | Performed by: NEUROLOGICAL SURGERY

## 2023-12-04 PROCEDURE — 1159F MED LIST DOCD IN RCRD: CPT | Mod: CPTII,,, | Performed by: NEUROLOGICAL SURGERY

## 2023-12-04 PROCEDURE — 1160F RVW MEDS BY RX/DR IN RCRD: CPT | Mod: CPTII,,, | Performed by: NEUROLOGICAL SURGERY

## 2023-12-04 PROCEDURE — 1160F PR REVIEW ALL MEDS BY PRESCRIBER/CLIN PHARMACIST DOCUMENTED: ICD-10-PCS | Mod: CPTII,,, | Performed by: NEUROLOGICAL SURGERY

## 2023-12-04 PROCEDURE — 99999 PR PBB SHADOW E&M-EST. PATIENT-LVL III: ICD-10-PCS | Mod: PBBFAC,,, | Performed by: NEUROLOGICAL SURGERY

## 2023-12-04 PROCEDURE — 99999 PR PBB SHADOW E&M-EST. PATIENT-LVL III: CPT | Mod: PBBFAC,,, | Performed by: NEUROLOGICAL SURGERY

## 2023-12-04 PROCEDURE — 3008F BODY MASS INDEX DOCD: CPT | Mod: CPTII,,, | Performed by: NEUROLOGICAL SURGERY

## 2023-12-04 PROCEDURE — 1159F PR MEDICATION LIST DOCUMENTED IN MEDICAL RECORD: ICD-10-PCS | Mod: CPTII,,, | Performed by: NEUROLOGICAL SURGERY

## 2023-12-04 PROCEDURE — 99213 OFFICE O/P EST LOW 20 MIN: CPT | Mod: S$PBB,,, | Performed by: NEUROLOGICAL SURGERY

## 2023-12-04 PROCEDURE — 99213 OFFICE O/P EST LOW 20 MIN: CPT | Mod: PBBFAC | Performed by: NEUROLOGICAL SURGERY

## 2023-12-04 PROCEDURE — 3008F PR BODY MASS INDEX (BMI) DOCUMENTED: ICD-10-PCS | Mod: CPTII,,, | Performed by: NEUROLOGICAL SURGERY

## 2023-12-04 RX ORDER — TRAZODONE HYDROCHLORIDE 50 MG/1
75 TABLET ORAL NIGHTLY PRN
COMMUNITY
Start: 2023-11-22

## 2023-12-04 RX ORDER — TRAZODONE HYDROCHLORIDE 100 MG/1
50 TABLET ORAL NIGHTLY PRN
COMMUNITY
Start: 2023-08-02

## 2023-12-04 NOTE — PROGRESS NOTES
angeline Mccann was seen in neurosurgical follow-up at the office today.  She underwent resection of the large lower left leg neurofibroma by Dr. Mariano at [facility] on 07/10/2023.  This seemed to go very well.  It took her some time with physical therapy to get all of her strength back but she is now walking unassisted and is very happy with her result.    On brief examination today she is alert and in good spirits.  Her left leg incision is well healed with the large neurofibroma mass removed.  She is able to stand on heels and toes although foot extension is a little weaker on the left.    I am happy to see her doing well.  She has been away from her work for quite a while now and feels a little lost.  I will have her seen in neuropsychological evaluation for evaluation and try to give some direction for her.  I will see her back in a few months in routine follow-up.

## 2023-12-06 ENCOUNTER — HOSPITAL ENCOUNTER (EMERGENCY)
Facility: HOSPITAL | Age: 33
Discharge: HOME | End: 2023-12-06
Attending: EMERGENCY MEDICINE
Payer: MEDICAID

## 2023-12-06 VITALS
SYSTOLIC BLOOD PRESSURE: 133 MMHG | RESPIRATION RATE: 18 BRPM | HEART RATE: 90 BPM | BODY MASS INDEX: 34.52 KG/M2 | WEIGHT: 160 LBS | DIASTOLIC BLOOD PRESSURE: 94 MMHG | OXYGEN SATURATION: 99 % | TEMPERATURE: 98 F | HEIGHT: 57 IN

## 2023-12-06 DIAGNOSIS — K08.89 PAIN, DENTAL: Primary | ICD-10-CM

## 2023-12-06 LAB
B-HCG UR QL: NEGATIVE
CTP QC/QA: YES

## 2023-12-06 PROCEDURE — 99284 EMERGENCY DEPT VISIT MOD MDM: CPT

## 2023-12-06 PROCEDURE — 81025 URINE PREGNANCY TEST: CPT | Performed by: PHYSICIAN ASSISTANT

## 2023-12-06 RX ORDER — NAPROXEN 500 MG/1
500 TABLET ORAL 2 TIMES DAILY WITH MEALS
Qty: 30 TABLET | Refills: 0 | Status: SHIPPED | OUTPATIENT
Start: 2023-12-06

## 2023-12-06 RX ORDER — CLINDAMYCIN HYDROCHLORIDE 300 MG/1
300 CAPSULE ORAL EVERY 8 HOURS
Qty: 21 CAPSULE | Refills: 0 | Status: SHIPPED | OUTPATIENT
Start: 2023-12-06 | End: 2023-12-13

## 2023-12-06 NOTE — ED PROVIDER NOTES
Encounter Date: 2023       History     Chief Complaint   Patient presents with    Dental Injury     Right upper tooth pain. Tooth was cracked two months ago.      33-year-old female presents to ED with concern of worsening pain to right upper tooth.  Patient reports fracturing tooth nearly 2 months ago.  She states she has been unable to follow-up with dentist secondary to insurance complications.  No facial swelling, headaches, fevers, chills.  Pain is sharp, worse with chewing, nonradiating, severity 4/10.  No other acute complaints at this time.    The history is provided by the patient.     Review of patient's allergies indicates:   Allergen Reactions    Penicillins      Past Medical History:   Diagnosis Date    Fibromatosis      Past Surgical History:   Procedure Laterality Date    brain biospy  2016     SECTION      leg biopsy      LEG SURGERY Left 07/10/2023    NF TUMOR RESECTION/LUL VERA MD/JARRETT     Family History   Problem Relation Age of Onset    Mental illness Sister         bipolar    Learning disabilities Son         ADHD     Social History     Tobacco Use    Smoking status: Some Days    Smokeless tobacco: Never    Tobacco comments:     Occ marijuana   Substance Use Topics    Alcohol use: Yes     Alcohol/week: 1.0 - 2.0 standard drink of alcohol     Types: 1 - 2 Glasses of wine per week    Drug use: No     Review of Systems   Constitutional:  Negative for chills and fever.   HENT:  Positive for dental problem. Negative for congestion, ear pain, mouth sores, sore throat and trouble swallowing.    Gastrointestinal:  Negative for nausea and vomiting.   Neurological:  Negative for headaches.       Physical Exam     Initial Vitals [23 1007]   BP Pulse Resp Temp SpO2   (!) 133/94 90 18 97.8 °F (36.6 °C) 99 %      MAP       --         Physical Exam    Nursing note and vitals reviewed.  Constitutional: Vital signs are normal. She appears well-developed and well-nourished. She is  cooperative. She does not have a sickly appearance. She does not appear ill. No distress.   HENT:   Head: Normocephalic and atraumatic.   Mouth/Throat:       Right upper last molar noted have small fracture with surrounding gingival swelling and tenderness.  No drainage.  No visible drainable abscess.  Oropharynx appearing clear.  Midline uvula.  No trismus.  No drooling.  No stridor.   Eyes: EOM are normal.   Neck:   Normal range of motion.  Musculoskeletal:      Cervical back: Normal range of motion.     Neurological: She is alert and oriented to person, place, and time. GCS eye subscore is 4. GCS verbal subscore is 5. GCS motor subscore is 6.   Psychiatric: She has a normal mood and affect. Her speech is normal and behavior is normal.         ED Course   Procedures  Labs Reviewed   POCT URINE PREGNANCY          Imaging Results    None          Medications - No data to display  Medical Decision Making  Patient presents with concern of pain to right upper tooth after fracturing tooth 2 months ago.  Pain has gradually worsened with localized swelling.  Afebrile.  Partially fractured tooth noted on exam with surrounding gingival swelling and tenderness.  No trismus or drooling.  Oropharynx clear.  No visible drainable abscess.    DDx:  Including but not limited to dental caries, dental infection, dental abscess, tooth fracture    No visible drainable abscess on exam.  Exam findings and history are concerning for possible dental infection.  Noted have allergy to penicillin.  Prescription for clindamycin and naproxen.  Encouraged very close monitoring with close dentist follow-up.  ED return precautions were discussed at length.  Patient states her understanding and agrees with plan.    Amount and/or Complexity of Data Reviewed  Labs: ordered.    Risk  Prescription drug management.                                      Clinical Impression:  Final diagnoses:  [K08.89] Pain, dental (Primary)          ED Disposition  Condition    Discharge Stable          ED Prescriptions       Medication Sig Dispense Start Date End Date Auth. Provider    clindamycin (CLEOCIN) 300 MG capsule Take 1 capsule (300 mg total) by mouth every 8 (eight) hours. for 7 days 21 capsule 12/6/2023 12/13/2023 Galen Driver PA-C    naproxen (NAPROSYN) 500 MG tablet Take 1 tablet (500 mg total) by mouth 2 (two) times daily with meals. 30 tablet 12/6/2023 -- Galen Driver PA-C          Follow-up Information       Follow up With Specialties Details Why Contact Info    Your Dentist                 Galen Driver PA-C  12/06/23 2999

## 2023-12-06 NOTE — DISCHARGE INSTRUCTIONS

## 2023-12-06 NOTE — ED TRIAGE NOTES
Cracked a tooth 2-3 months ago - presents with c/o pain x 5 days. Upper right tooth. Taking Advil without relief. No fever reported.

## 2024-01-17 ENCOUNTER — TELEPHONE (OUTPATIENT)
Dept: NEUROSURGERY | Facility: CLINIC | Age: 34
End: 2024-01-17
Payer: MEDICAID

## 2024-01-17 NOTE — TELEPHONE ENCOUNTER
----- Message from Norma Doty sent at 1/16/2024  1:49 PM CST -----  Regarding: RE: schedule referral from Dr Moreno  I see the ref in, we have about a 6 mo wait right now. :(     Unless the pt is pre surge, so sorry!   ----- Message -----  From: Joel Reyes MA  Sent: 1/16/2024   1:14 PM CST  To: Norma Doty  Subject: schedule referral from Dr Josh Green, Can you tell me the status of scheduling for the above patient? Thanks, EJ

## 2024-01-25 ENCOUNTER — TELEPHONE (OUTPATIENT)
Dept: NEUROSURGERY | Facility: CLINIC | Age: 34
End: 2024-01-25
Payer: MEDICAID

## 2024-01-25 NOTE — TELEPHONE ENCOUNTER
----- Message from Joel Reyes MA sent at 1/24/2024  5:00 PM CST -----  Regarding: FW: pt advice  Contact: [phone]    ----- Message -----  From: Myriam Encinas  Sent: 1/24/2024   3:39 PM CST  To: Josh NARVAEZ Staff  Subject: pt advice                                        Pt calling in regards to speaking to sukumar THOMPSON call       
Lvm to return my call.   
no

## 2024-03-11 DIAGNOSIS — M79.602 LEFT ARM PAIN: Primary | ICD-10-CM

## 2024-03-21 ENCOUNTER — CLINICAL SUPPORT (OUTPATIENT)
Dept: REHABILITATION | Facility: HOSPITAL | Age: 34
End: 2024-03-21
Payer: MEDICAID

## 2024-03-21 DIAGNOSIS — M62.81 MUSCLE WEAKNESS OF LEFT UPPER EXTREMITY: Primary | ICD-10-CM

## 2024-03-21 PROCEDURE — 97161 PT EVAL LOW COMPLEX 20 MIN: CPT | Mod: PN | Performed by: PHYSICAL THERAPIST

## 2024-03-21 PROCEDURE — 97110 THERAPEUTIC EXERCISES: CPT | Mod: PN | Performed by: PHYSICAL THERAPIST
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
## 2024-03-26 ENCOUNTER — CLINICAL SUPPORT (OUTPATIENT)
Dept: REHABILITATION | Facility: HOSPITAL | Age: 34
End: 2024-03-26
Payer: MEDICAID

## 2024-03-26 DIAGNOSIS — M62.81 MUSCLE WEAKNESS OF LEFT UPPER EXTREMITY: Primary | ICD-10-CM

## 2024-03-26 PROCEDURE — 97110 THERAPEUTIC EXERCISES: CPT | Mod: PN
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
## 2024-04-02 ENCOUNTER — CLINICAL SUPPORT (OUTPATIENT)
Dept: REHABILITATION | Facility: HOSPITAL | Age: 34
End: 2024-04-02
Payer: MEDICAID

## 2024-04-02 DIAGNOSIS — M62.81 MUSCLE WEAKNESS OF LEFT UPPER EXTREMITY: Primary | ICD-10-CM

## 2024-04-02 PROCEDURE — 97110 THERAPEUTIC EXERCISES: CPT | Mod: PN | Performed by: PHYSICAL THERAPIST
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
## 2024-04-09 ENCOUNTER — TELEPHONE (OUTPATIENT)
Dept: REHABILITATION | Facility: HOSPITAL | Age: 34
End: 2024-04-09
Payer: MEDICAID

## 2024-04-09 NOTE — TELEPHONE ENCOUNTER
Returned call from [facility] regarding today's missed appointment. She stated that she did call to cancel due to having to take her child to the dentist. She did confirm her next appointment

## 2024-04-12 ENCOUNTER — CLINICAL SUPPORT (OUTPATIENT)
Dept: REHABILITATION | Facility: HOSPITAL | Age: 34
End: 2024-04-12
Payer: MEDICAID

## 2024-04-12 DIAGNOSIS — M62.81 MUSCLE WEAKNESS OF LEFT UPPER EXTREMITY: Primary | ICD-10-CM

## 2024-04-12 PROCEDURE — 97110 THERAPEUTIC EXERCISES: CPT | Mod: PN | Performed by: PHYSICAL THERAPIST
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
## 2024-04-18 ENCOUNTER — CLINICAL SUPPORT (OUTPATIENT)
Dept: REHABILITATION | Facility: HOSPITAL | Age: 34
End: 2024-04-18
Payer: MEDICAID

## 2024-04-18 DIAGNOSIS — M62.81 MUSCLE WEAKNESS OF LEFT UPPER EXTREMITY: Primary | ICD-10-CM

## 2024-04-18 PROCEDURE — 97110 THERAPEUTIC EXERCISES: CPT | Mod: PN,CQ
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
## 2024-04-23 ENCOUNTER — CLINICAL SUPPORT (OUTPATIENT)
Dept: REHABILITATION | Facility: HOSPITAL | Age: 34
End: 2024-04-23
Payer: MEDICAID

## 2024-04-23 DIAGNOSIS — M62.81 MUSCLE WEAKNESS OF LEFT UPPER EXTREMITY: Primary | ICD-10-CM

## 2024-04-23 PROCEDURE — 97110 THERAPEUTIC EXERCISES: CPT | Mod: PN,CQ
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
## 2024-04-30 ENCOUNTER — CLINICAL SUPPORT (OUTPATIENT)
Dept: REHABILITATION | Facility: HOSPITAL | Age: 34
End: 2024-04-30
Payer: MEDICAID

## 2024-04-30 DIAGNOSIS — M62.81 MUSCLE WEAKNESS OF LEFT UPPER EXTREMITY: Primary | ICD-10-CM

## 2024-04-30 PROCEDURE — 97110 THERAPEUTIC EXERCISES: CPT | Mod: PN | Performed by: PHYSICAL THERAPIST

## 2024-04-30 PROCEDURE — 97110 THERAPEUTIC EXERCISES: CPT | Mod: PN,CQ
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
## 2024-04-30 NOTE — PROGRESS NOTES
[facility]  Physical Therapy Functional Dry Needling Treatment Note     Date:  4/30/2024    Name: Kadie Mccann  Clinic Number: [id number]    Therapy Diagnosis:   Encounter Diagnosis   Name Primary?    Muscle weakness of left upper extremity Yes     Physician: Uma Mike MD    Start Time:  3:45 pm  Stop Time:  4:05 pm  Total Billable Time: 10 minutes + 10 unbilled e-stim minutes (TE) - MEDICAID    Subjective      Pt reports: she would like dry needling today and has responded very well in the past.  See note by Andrea Monsivais    Pain: 5/10  Location: left shoulder      Objective      See EMR under MEDIA for signed written consent provided.     Palpation Assessment to determine the necessity for Functional Dry Needling  left rotator cuff.     Kadie received the following manual therapy techniques: dry needling to left supraspinatus and infraspinatus    Home Exercises and Patient Education      Education provided:   - Potential risks and benefits of dry needling - consent signed    Written Handout on response to FDN provided: Yes    Kadie demonstrated good  understanding of the education provided.     Assessment      Patient demonstrated appropriate response to Functional Dry Needling. good  rhythmical contractions observed with estim to treated muscle groups.     Kadie was agreeable to dry needling by a certified dry needling PT and signed a consent prior to treatment after being made aware of risks. 2x50 and 2x40 mm needles were placed into the origin and teno-osseous insertion of the patient's supraspinatus, infraspinatus. Periostal pecking and winding for grasp were performed. Electrical stimulation was applied to these needles at 4 Hz and a pulse of 250 µseconds for 10 minutes with a [facility] check. Afterwards, needles were removed and placed into a sharps container. Patient demonstrated appropriate response to Functional Dry Needling. good  rhythmical contractions observed  with estim to treated muscle groups.     Plan      Monitor response to Functional Dry Needling. Continue with Functional Dry Needling in POC as appropriate.     Ted Wall, PT  4/30/2024

## 2024-05-02 ENCOUNTER — TELEPHONE (OUTPATIENT)
Dept: REHABILITATION | Facility: HOSPITAL | Age: 34
End: 2024-05-02
Payer: MEDICAID

## 2024-05-07 ENCOUNTER — CLINICAL SUPPORT (OUTPATIENT)
Dept: REHABILITATION | Facility: HOSPITAL | Age: 34
End: 2024-05-07
Payer: MEDICAID

## 2024-05-07 DIAGNOSIS — M62.81 MUSCLE WEAKNESS OF LEFT UPPER EXTREMITY: Primary | ICD-10-CM

## 2024-05-07 PROCEDURE — 97110 THERAPEUTIC EXERCISES: CPT | Mod: PN,CQ
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
## 2024-05-14 ENCOUNTER — CLINICAL SUPPORT (OUTPATIENT)
Dept: REHABILITATION | Facility: HOSPITAL | Age: 34
End: 2024-05-14
Payer: MEDICAID

## 2024-05-14 DIAGNOSIS — M62.81 MUSCLE WEAKNESS OF LEFT UPPER EXTREMITY: Primary | ICD-10-CM

## 2024-05-14 PROCEDURE — 97110 THERAPEUTIC EXERCISES: CPT | Mod: PN
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
## 2024-05-21 ENCOUNTER — CLINICAL SUPPORT (OUTPATIENT)
Dept: REHABILITATION | Facility: HOSPITAL | Age: 34
End: 2024-05-21
Payer: MEDICAID

## 2024-05-21 DIAGNOSIS — M62.81 MUSCLE WEAKNESS OF LEFT UPPER EXTREMITY: Primary | ICD-10-CM

## 2024-05-21 PROCEDURE — 97110 THERAPEUTIC EXERCISES: CPT | Mod: PN
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
## 2024-08-07 NOTE — PROGRESS NOTES
OUTPATIENT NEUROLOGICAL REHABILITATION  SPEECH THERAPY PROGRESS NOTE    Date:  1/25/2018     Start Time:  1015  Stop Time:  1100    Subjective/History  Onset Date:  NF type I, is congenital  Primary Diagnosis:  Astrocytoma   Treatment Diagnosis:    Encounter Diagnoses   Name Primary?    Cognitive deficits     Dysarthria     Dysphonia       Referring Provider:  Dr. 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    Precautions:  Fall     TIMED  Procedure Min.   Cog tx  0         UNTIMED  Procedure Min.   Speech/lang tx 45 min           Total Minutes: 45  Total Timed Units: 0  Total Untimed Units: 1  Charges Billed/# of units: 1    Visit #:  17  Date of Evaluation:  10/5/17  Plan of Care Expiration:    2/7/18    Progress/Current Status  Subjective:   Pain: 0 /10  Pt requested working on language, word finding, and articulation in Fijian.  present.    Objective:     Short Term Goals: (8 weeks) Current Progress:   4. Pt will spell 4-6 letter words with 80% acc given min A to improve language skills.  -4 letter words MET previously  -5 letter word English -Not addressed this session.   -6 letter word English -Not addressed this session.     - 4 -5 letter words Fijian 90% acc x 1 session     5. Pt will participate in vocal function exercises (sustained phonation, pitch glides, etc) x 5 each with min A to improve vocal quality.  --Not addressed this session.     6. Pt will recall/utilize memory strategies (WRAP) with min A to assist in delayed recall.    reviewed    7. Pt will participate in delayed recall of 3 words given min A utilizing memory strategies.    -Not addressed this session.    9. Pt will participate in mental manipulation tasks with 80%  acc given min A to address attention.  - immediate recall in [place]ian min-Mod A       10. Pt will participate in articulation hierarchy exercises with 80% acc given min A to improve dysarthria.    -mod A      11. Pt will name uncommon objects with 90% acc given min A to improve word finding skills.    -Not addressed this session.    13. Pt will participate in organization and attention tasks with 80% acc given min A.   Attention worksheet with min A    14. Pt will participate in immediate/recent recall given 3-5 words given min A with 80% acc. -Recall of three related words: MET previously   -recall of 4 related words: MET previously  Recall of 4 unrelated words: -Not addressed this session.      17. Pt will participate in games in order to address attention, word-finding, and logic with 80% acc independently  --Not addressed this session.        18. Pt will name 10 concrete, abstract, and specific letter category members with 80% acc given min A to improve word finding in [place]an. Animals x 25 Independently in 60 seconds (Ugandan)  Fruits/veggies x 16 Independently in 60 seconds (Ugandan)  /m/ x 13 Independently in 60 seconds (Ugandan)  /n/ x 9 Independently in 60 seconds (Ugandan)  /d/ x 14 Independently in 60 seconds (Ugandan)  Expensive x 23 Independently in 60 seconds (Ugandan)      GOALS MET:   1. Pt will participate in a clinical swallow evaluation to assess swallow function. GOAL MET  2. Pt will answer complex yes/no questions with 80% acc given min A to address auditory comprehension. GOAL MET  8. Pt will name 15 abstract category members in 60 seconds with 80% acc given min A to improve word finding. GOAL MET  12. Pt will read 3-5 word sentences with 85% acc given min A. GOAL MET   15. Pt will list words that rhyme with 80% acc given min A to improve word finding. GOAL MET  16. Pt will spell 4-7 word sentences with 80% acc given min A.  GOAL MET        Assessment:   Pt is progressing towards goals.   Continue to address language in Pakistani with the assistance of a . Current goals remain appropriate. Goals to be adjusted as necessary.      Mild dysarthria c/b slow rate of speech and imprecise articulation. Moderate dysphonia noted c/b decreased breath support, pitch variability, tremor, pitch breaks during sustained phonation and pitch glides. Speech intelligibility %. Mild dysphagia based on pt's report of coughing on solids and thin liquids occasionaly; will assess further. Mild cognitive-linguistic deficits c/b impairments in attention, language, spelling, and delayed recall. Mild aphasia c/b occasional word-finding deficits. Patient will benefit from outpatient neurological rehabilitation speech therapy. Prognosis for improvement remains good.         Patient Education/Response:   Educated pt on goals and plan of care. Pt reported understanding.      Home Program: [employer]. Translate spelling words in Pakistani to English and spell them correctly.      Plans and Goals:      Continue POC with focus on cognition and word finding in Pakistani.   Updated POC due 2/7/18.         Christel Aguayo M.S.CCC-SLP  Speech Language Pathologist   1/25/2018                  Name band;

## 2024-10-08 ENCOUNTER — TELEPHONE (OUTPATIENT)
Dept: NEUROSURGERY | Facility: CLINIC | Age: 34
End: 2024-10-08
Payer: MEDICAID

## 2024-10-08 NOTE — TELEPHONE ENCOUNTER
----- Message from Olga sent at 10/7/2024  9:35 AM CDT -----  Contact: pt @ [phone]  Kadie M Castellon calling regarding Patient Advice (message) for #pt is calling to speak with EJ concerning referral, asking for call back

## 2025-01-27 ENCOUNTER — OFFICE VISIT (OUTPATIENT)
Dept: URGENT CARE | Facility: CLINIC | Age: 35
End: 2025-01-27
Payer: MEDICAID

## 2025-01-27 VITALS
RESPIRATION RATE: 20 BRPM | HEIGHT: 57 IN | OXYGEN SATURATION: 96 % | TEMPERATURE: 98 F | WEIGHT: 160.06 LBS | DIASTOLIC BLOOD PRESSURE: 80 MMHG | BODY MASS INDEX: 34.53 KG/M2 | HEART RATE: 92 BPM | SYSTOLIC BLOOD PRESSURE: 135 MMHG

## 2025-01-27 DIAGNOSIS — B96.89 ACUTE BACTERIAL SINUSITIS: Primary | ICD-10-CM

## 2025-01-27 DIAGNOSIS — Z87.898 HX OF SHORTNESS OF BREATH: ICD-10-CM

## 2025-01-27 DIAGNOSIS — R09.81 NASAL CONGESTION WITH RHINORRHEA: ICD-10-CM

## 2025-01-27 DIAGNOSIS — J01.90 ACUTE BACTERIAL SINUSITIS: Primary | ICD-10-CM

## 2025-01-27 DIAGNOSIS — J34.89 NASAL CONGESTION WITH RHINORRHEA: ICD-10-CM

## 2025-01-27 DIAGNOSIS — R05.8 OTHER COUGH: ICD-10-CM

## 2025-01-27 DIAGNOSIS — R03.0 ELEVATED BLOOD-PRESSURE READING WITHOUT DIAGNOSIS OF HYPERTENSION: ICD-10-CM

## 2025-01-27 PROCEDURE — 99214 OFFICE O/P EST MOD 30 MIN: CPT | Mod: S$GLB,,, | Performed by: PHYSICIAN ASSISTANT

## 2025-01-27 RX ORDER — ALBUTEROL SULFATE 90 UG/1
1-2 INHALANT RESPIRATORY (INHALATION) EVERY 4 HOURS PRN
Qty: 18 G | Refills: 0 | Status: SHIPPED | OUTPATIENT
Start: 2025-01-27 | End: 2025-02-26

## 2025-01-27 RX ORDER — DOXYCYCLINE 100 MG/1
100 CAPSULE ORAL EVERY 12 HOURS
Qty: 20 CAPSULE | Refills: 0 | Status: SHIPPED | OUTPATIENT
Start: 2025-01-27 | End: 2025-02-06

## 2025-01-27 RX ORDER — LORATADINE 10 MG/1
10 TABLET ORAL DAILY PRN
Qty: 30 TABLET | Refills: 0 | Status: SHIPPED | OUTPATIENT
Start: 2025-01-27 | End: 2025-02-26

## 2025-01-27 RX ORDER — FLUTICASONE PROPIONATE 50 MCG
2 SPRAY, SUSPENSION (ML) NASAL DAILY PRN
Qty: 15.8 ML | Refills: 0 | Status: SHIPPED | OUTPATIENT
Start: 2025-01-27 | End: 2025-02-26

## 2025-01-27 RX ORDER — PROMETHAZINE HYDROCHLORIDE AND DEXTROMETHORPHAN HYDROBROMIDE 6.25; 15 MG/5ML; MG/5ML
5 SYRUP ORAL EVERY 6 HOURS PRN
Qty: 180 ML | Refills: 0 | Status: SHIPPED | OUTPATIENT
Start: 2025-01-27 | End: 2025-02-10

## 2025-01-27 NOTE — PATIENT INSTRUCTIONS
Recommend oral antihistamine (claritin, zyrtec, allegra,xyzal),oral decongestant (pseudoephedrine)  for rhinorrhea/ear congestion <3 days if blood pressure is <130/80mmhg, steroid nasal spray (flonase) , prescription (promethazine-DM) at night due to drowsiness  /OTC cough medicine (Mucinex DM 12 hour,  Tylenol (Acetaminophen) and/or Motrin (Ibuprofen) as directed for control of pain and/or fever.    Discussed with patient to eat a full meal before taking doxycycline to prevent nausea and vomiting. Please decrease sun exposure and do not take with dairy products.      Please drink plenty of fluids.  Please get plenty of rest.  Nasal irrigation with a saline spray or Netti Pot like device per their directions is also recommended.  If you  smoke, please stop smoking.    To help ease a sore throat, you can:  Use a sore throat spray.  Suck on hard candy or throat lozenges.  Gargle with warm saltwater a few times each day. Mix of 1/4 teaspoon (1.25 grams) salt in 8 ounces (240 mL) of warm water.  Use a cool mist humidifier to help you breathe easier.    If you negative (-) for a COVID test today and you are continuing to have symptoms, it is recommended to repeat the test in 48 hours x 3. If you continue to be negative, you may return to school/work once you have improved symptoms and no fever for 24 hours without any medications. This applies to all viral illnesses.       Discussed prescriptions and over-the-counter medicines to help with patient's symptoms:  A steroid nose spray (flonase) and antihistamine nasal spray (azelastine) can help with a stuffy nose. It can also help with drainage down the back of your throat.  An antihistamine (loratadine,zyrtec,allegra, xyzal) can help with itching, sneezing, or runny nose.  An antihistamine eye drop can help with itchy eyes.  A decongestant (pseudoephedrine,  Phenylephrine, oxymetazoline aka afrin nasal spray) can help with a stuffy nose. Take <10 days for congestion and  rhinorrhea. Once symptoms improve, proceed with loratadine/zyrtec once a day. These ingredients can keep you up all night, decrease appetite, feel jittery, and raise blood pressure with long term use.  OTC Coricidin can be used for patients with hypertension and palpitations because you cannot use ingredients such as pseudoephedrine and phenylephrine for oral decongestants.  Coricidin HBP Cough & Cold (Chlorpheniramine/Dextromethorphan)  Coricidin HBP Maximum Strength Multi-Symptom Flu  (Acetaminophen,Dextromethorphan, Chlorpheniramine)  Coricidin HBP® Maximum Strength Cold & Flu Day/Night (Acetaminophen,Dextromethorphan, Doxylamine, Guaifenesin)  Coricidin HBP Chest Congestion & Cough (Dextromethorphan + Guaifenesin)    Medications that control cough are suppressants and expectorants. Suppressants are tessalon pearls and dextromethorphan. If you have a productive cough with sputum, you need an expectorant called guaifenesin. Dextromethorphan and Guaifenesin are active ingredients in many OTC cough/cold medications such as Dayquil/Nyquil, Mucinex, and Robitussin Mucus+Chest Congestion.            Common Cold Medicine Ingredients Cheat sheet  Acetaminophen (APAP) -pain reliever/fever reducer  Dextromethorphan - cough suppressant  Guaifenesin - expectorant/thins and loosens mucus  Phenylephrine - nasal decongestant  Diphenhydramine or Doxylamine succinate - antihistamine, helps you fall asleep  Promethazine or Brompheniramine - Prescription strength antihistamines    These OTC cold medications are safe to use if you do not have high blood pressure (hypertension) or palpitations.  DayQuil and NyQuil - Cough, Cold & Flu Relief LiquiCaps  DayQuil: Acetaminophen, Dextromethorphan, and Phenylephrine   NyQuil: Acetaminophen, Dextromethorphan, and Doxylamine  DayQuil and NyQuil SEVERE Maximum Strength Cough, Cold & Flu Relief LiquiCaps  DAYQUIL: Acetaminophen, Dextromethorphan, Guaifenesin, and Phenylephrine  NYQUIL:  Acetaminophen, Dextromethorphan, Doxylamine, and Phenylephrine   Mucinex DM: Guaifenesin,Dextromethorphan  Mucinex Maximum Strength Sinus-Max® Pressure, Pain & Cough Liquid Gels: Acetaminophen/Dextromethorphan/ Guaifenesin/Phenylephrine       If not allergic, take Tylenol (Acetaminophen) 650 mg to  1 g every 6 hours as needed  and/or Motrin (Ibuprofen) 600 to 800 mg every 6 hours as needed for fever or pain.      Discussed adverse side effects of recurrent/long term steroid use: elevated blood pressure elevated blood sugar, pancreatitis,glaucoma/cataracts, weight gain in face/abdomen/neck, round face (moon face), fluid retention in legs/lungs, mood swings, upset stomach, increased risk of infections, osteoporosis and increased risk of fractures, fatigue, loss of appetite, nausea and muscle weakness, thin skin, bruising and slower wound healing.    If your blood pressure was elevated during your visit and this was discussed with you, please obtain a home blood pressure cuff and take your blood pressure once daily for two weeks, record values and follow up with your PCP. If you were started on blood pressure medication today, ensure you obtain a follow up appointment with your PCP in 5-7 days for a re-check, if you develop shortness of breath, severe headache, weakness, chest pain, vision problems after leaving urgent care, call 911 and go to the ER immediately.       Please remember that you have received care at an urgent care today. Urgent cares are not emergency rooms and are not equipped to handle life threatening emergencies and cannot rule in or out certain medical conditions and you may be released before all of your medical problems are known or treated.     Please arrange follow up with your primary care physician or speciality clinic within 2-5 days if your signs and symptoms have not resolved or worsen.     Patient can call our Referral Hotline at [phone] to make an appointment.      Please return  here or go to the Emergency Department for any concerns or worsening of condition.  Signs of infection. These include a fever of 100.4°F (38°C) or higher, chills, cough, more sputum or change in color of sputum.  You are having so much trouble breathing that you can only say one or two words at a time.  You need to sit upright at all times to be able to breathe and or cannot lie down.  You have trouble breathing when talking or sitting still.  You have a fever of 100.4°F (38°C) or higher or chills.  You have chest pain when you cough, have trouble breathing but can still talk in full sentences, or cough up blood.

## 2025-01-27 NOTE — PROGRESS NOTES
"Subjective:      Patient ID: Kadie Mccann is a 34 y.o. female.    Vitals:  height is 4' 9" (1.448 m) and weight is 72.6 kg (160 lb 0.9 oz). Her oral temperature is 98.2 °F (36.8 °C). Her blood pressure is 135/80 and her pulse is 92. Her respiration is 20 and oxygen saturation is 96%.     Chief Complaint: Cough and Sinus Problem    Kadie Mccann is a 34 y.o. female who complains of   productive cough, post nasal drip, congestion, short winded at times due to congestion, sx started over two weeks ago, tx: turmeric, alin, salt, and pepper.         Sinus Problem  This is a new problem. The current episode started 1 to 4 weeks ago. The problem is unchanged. There has been no fever. The pain is mild. Associated symptoms include chills, congestion, coughing, headaches, shortness of breath, sinus pressure and sneezing. Pertinent negatives include no diaphoresis, ear pain, hoarse voice, neck pain, sore throat or swollen glands. Past treatments include nothing. The treatment provided no relief.       Constitution: Positive for activity change, chills and fatigue. Negative for appetite change, sweating, fever and generalized weakness.   HENT:  Positive for congestion, postnasal drip, sinus pain and sinus pressure. Negative for ear pain, ear discharge, foreign body in ear, tinnitus, sore throat, trouble swallowing and voice change.    Neck: Negative for neck pain.   Cardiovascular:  Negative for chest pain, leg swelling, palpitations and sob on exertion.   Respiratory:  Positive for cough, sputum production and shortness of breath. Negative for stridor, wheezing and asthma.    Gastrointestinal:  Negative for nausea and vomiting.   Musculoskeletal:  Negative for muscle cramps and muscle ache.   Allergic/Immunologic: Positive for sneezing. Negative for environmental allergies, seasonal allergies, food allergies and asthma.   Neurological:  Positive for headaches. Negative for history of migraines.      Objective: "     Physical Exam   Constitutional: She is oriented to person, place, and time. She is cooperative. No distress.      Comments:Patient is awake and alert, sitting up in exam chair, speaking and answering in complete sentences     normalawake  HENT:   Head: Normocephalic and atraumatic.      Comments: Patient observed breathing through mouth, sniffling, and frequently clearing throat.  Tenderness to frontal sinuses and maxillary sinuses; pain elicited to areas when patient lean forward    Ears:   Right Ear: External ear and ear canal normal. A middle ear effusion is present.   Left Ear: External ear and ear canal normal. A middle ear effusion is present.   Nose: Mucosal edema, rhinorrhea, sinus tenderness and congestion present. Right sinus exhibits maxillary sinus tenderness and frontal sinus tenderness. Left sinus exhibits maxillary sinus tenderness and frontal sinus tenderness.   Mouth/Throat: Uvula is midline, oropharynx is clear and moist and mucous membranes are normal. Mucous membranes are moist. No oropharyngeal exudate or posterior oropharyngeal erythema. Tonsils are 0 on the right. Tonsils are 0 on the left. No tonsillar exudate. Oropharynx is clear.      Comments:  postnasal discharge noted on the posterior pharyngeal wall    Eyes: Conjunctivae and lids are normal. Pupils are equal, round, and reactive to light. Lids are everted and swept, no foreign bodies found. Extraocular movement intact vision grossly intact gaze aligned appropriately   Neck: Neck supple.   Cardiovascular: Normal rate, regular rhythm, normal heart sounds and normal pulses.   Pulmonary/Chest: Effort normal and breath sounds normal. No respiratory distress. She has no wheezes. She has no rhonchi. She has no rales.   Abdominal: Normal appearance.   Musculoskeletal: Normal range of motion.         General: Normal range of motion.      Cervical back: She exhibits no tenderness.   Lymphadenopathy:     She has no cervical adenopathy.  "  Neurological: She is alert and oriented to person, place, and time.   Skin: Skin is warm.   Psychiatric: Her behavior is normal. Mood, judgment and thought content normal.   Nursing note and vitals reviewed.    Vitals:    01/27/25 1250 01/27/25 1409   BP: (!) 140/88 135/80   BP Location: Right arm    Patient Position: Sitting    Pulse: 92    Resp: 20    Temp: 98.2 °F (36.8 °C)    TempSrc: Oral    SpO2: 96%    Weight: 72.6 kg (160 lb 0.9 oz)    Height: 4' 9" (1.448 m)          Assessment:     1. Acute bacterial sinusitis    2. Other cough    3. Nasal congestion with rhinorrhea    4. Hx of shortness of breath    5. Elevated blood-pressure reading without diagnosis of hypertension      Patient presents with clinical exam findings and history consistent with above.      On exam, patient is nontoxic appearing and vitals are stable.      Diagnostic testing results were reviewed and discussed with patient/guardian.   Tests ordered in clinic: None  Previous progress notes/admissions/labs and medications were reviewed.  [place] [facility] [place] for 4 day hx of flu-like symptoms; tested negative for COVID-19/flu/strep.     Plan:       Acute bacterial sinusitis  -     doxycycline (MONODOX) 100 MG capsule; Take 1 capsule (100 mg total) by mouth every 12 (twelve) hours. for 10 days  Dispense: 20 capsule; Refill: 0  -     Ambulatory referral/consult to Internal Medicine    Other cough  -     promethazine-dextromethorphan (PROMETHAZINE-DM) 6.25-15 mg/5 mL Syrp; Take 5 mLs by mouth every 6 (six) hours as needed (cough).  Dispense: 180 mL; Refill: 0  -     dextromethorphan-guaiFENesin  mg (MUCINEX DM)  mg per 12 hr tablet; Take 1 tablet by mouth 2 (two) times daily as needed (productive cough).  Dispense: 20 tablet; Refill: 0    Nasal congestion with rhinorrhea  -     fluticasone propionate (FLONASE) 50 mcg/actuation nasal spray; 2 sprays (100 mcg total) by Each Nostril route daily as needed for Allergies or " "Rhinitis.  Dispense: 15.8 mL; Refill: 0  -     loratadine (CLARITIN) 10 mg tablet; Take 1 tablet (10 mg total) by mouth daily as needed for Allergies (Rhinitis, Congestion).  Dispense: 30 tablet; Refill: 0    Hx of shortness of breath  -     albuterol (PROVENTIL/VENTOLIN HFA) 90 mcg/actuation inhaler; Inhale 1-2 puffs into the lungs every 4 (four) hours as needed for Wheezing or Shortness of Breath.  Dispense: 18 g; Refill: 0  -     inhalation spacing device; Use as directed for inhalation.  Dispense: 1 each; Refill: 0    Elevated blood-pressure reading without diagnosis of hypertension  -     Ambulatory referral/consult to Internal Medicine                    1) See orders for this visit as documented in the electronic medical record.  2) Symptomatic therapy suggested: use acetaminophen/ibuprofen every 6-8 hours prn pain or fever, push fluids.   3) Call or return to clinic prn if these symptoms worsen or fail to improve as anticipated.    Discussed results/diagnosis/plan with patient in clinic.  We had shared decision making for patient's treatment. Patient verbalized understanding and in agreement with current treatment plan.     Patient was instructed to return for re-evaluation with urgent care or PCP for continued outpatient workup and management if symptoms do not improve/worsening symptoms. Strict ED versus clinic precautions given in depth.    Discharge and follow-up instructions given verbally/printed with the patient who expressed understanding. [facility] instructions and [facility]              Alpa "Ewa" ELIZABET Olvera          Patient Instructions   Recommend oral antihistamine (claritin, zyrtec, allegra,xyzal),oral decongestant (pseudoephedrine)  for rhinorrhea/ear congestion <3 days if blood pressure is <130/80mmhg, steroid nasal spray (flonase) , prescription (promethazine-DM) at night due to drowsiness  /OTC cough medicine (Mucinex DM 12 hour,  Tylenol (Acetaminophen) and/or " Motrin (Ibuprofen) as directed for control of pain and/or fever.    Discussed with patient to eat a full meal before taking doxycycline to prevent nausea and vomiting. Please decrease sun exposure and do not take with dairy products.      Please drink plenty of fluids.  Please get plenty of rest.  Nasal irrigation with a saline spray or Netti Pot like device per their directions is also recommended.  If you  smoke, please stop smoking.    To help ease a sore throat, you can:  Use a sore throat spray.  Suck on hard candy or throat lozenges.  Gargle with warm saltwater a few times each day. Mix of 1/4 teaspoon (1.25 grams) salt in 8 ounces (240 mL) of warm water.  Use a cool mist humidifier to help you breathe easier.    If you negative (-) for a COVID test today and you are continuing to have symptoms, it is recommended to repeat the test in 48 hours x 3. If you continue to be negative, you may return to school/work once you have improved symptoms and no fever for 24 hours without any medications. This applies to all viral illnesses.       Discussed prescriptions and over-the-counter medicines to help with patient's symptoms:  A steroid nose spray (flonase) and antihistamine nasal spray (azelastine) can help with a stuffy nose. It can also help with drainage down the back of your throat.  An antihistamine (loratadine,zyrtec,allegra, xyzal) can help with itching, sneezing, or runny nose.  An antihistamine eye drop can help with itchy eyes.  A decongestant (pseudoephedrine,  Phenylephrine, oxymetazoline aka afrin nasal spray) can help with a stuffy nose. Take <10 days for congestion and rhinorrhea. Once symptoms improve, proceed with loratadine/zyrtec once a day. These ingredients can keep you up all night, decrease appetite, feel jittery, and raise blood pressure with long term use.  OTC Coricidin can be used for patients with hypertension and palpitations because you cannot use ingredients such as pseudoephedrine and  phenylephrine for oral decongestants.  Coricidin HBP Cough & Cold (Chlorpheniramine/Dextromethorphan)  Coricidin HBP Maximum Strength Multi-Symptom Flu  (Acetaminophen,Dextromethorphan, Chlorpheniramine)  Coricidin HBP® Maximum Strength Cold & Flu Day/Night (Acetaminophen,Dextromethorphan, Doxylamine, Guaifenesin)  Coricidin HBP Chest Congestion & Cough (Dextromethorphan + Guaifenesin)    Medications that control cough are suppressants and expectorants. Suppressants are tessalon pearls and dextromethorphan. If you have a productive cough with sputum, you need an expectorant called guaifenesin. Dextromethorphan and Guaifenesin are active ingredients in many OTC cough/cold medications such as Dayquil/Nyquil, Mucinex, and Robitussin Mucus+Chest Congestion.            Common Cold Medicine Ingredients Cheat sheet  Acetaminophen (APAP) -pain reliever/fever reducer  Dextromethorphan - cough suppressant  Guaifenesin - expectorant/thins and loosens mucus  Phenylephrine - nasal decongestant  Diphenhydramine or Doxylamine succinate - antihistamine, helps you fall asleep  Promethazine or Brompheniramine - Prescription strength antihistamines    These OTC cold medications are safe to use if you do not have high blood pressure (hypertension) or palpitations.  DayQuil and NyQuil - Cough, Cold & Flu Relief LiquiCaps  DayQuil: Acetaminophen, Dextromethorphan, and Phenylephrine   NyQuil: Acetaminophen, Dextromethorphan, and Doxylamine  DayQuil and NyQuil SEVERE Maximum Strength Cough, Cold & Flu Relief LiquiCaps  DAYQUIL: Acetaminophen, Dextromethorphan, Guaifenesin, and Phenylephrine  NYQUIL: Acetaminophen, Dextromethorphan, Doxylamine, and Phenylephrine   Mucinex DM: Guaifenesin,Dextromethorphan  Mucinex Maximum Strength Sinus-Max® Pressure, Pain & Cough Liquid Gels: Acetaminophen/Dextromethorphan/ Guaifenesin/Phenylephrine       If not allergic, take Tylenol (Acetaminophen) 650 mg to  1 g every 6 hours as needed  and/or Motrin  (Ibuprofen) 600 to 800 mg every 6 hours as needed for fever or pain.      Discussed adverse side effects of recurrent/long term steroid use: elevated blood pressure elevated blood sugar, pancreatitis,glaucoma/cataracts, weight gain in face/abdomen/neck, round face (moon face), fluid retention in legs/lungs, mood swings, upset stomach, increased risk of infections, osteoporosis and increased risk of fractures, fatigue, loss of appetite, nausea and muscle weakness, thin skin, bruising and slower wound healing.    If your blood pressure was elevated during your visit and this was discussed with you, please obtain a home blood pressure cuff and take your blood pressure once daily for two weeks, record values and follow up with your PCP. If you were started on blood pressure medication today, ensure you obtain a follow up appointment with your PCP in 5-7 days for a re-check, if you develop shortness of breath, severe headache, weakness, chest pain, vision problems after leaving urgent care, call 911 and go to the ER immediately.       Please remember that you have received care at an urgent care today. Urgent cares are not emergency rooms and are not equipped to handle life threatening emergencies and cannot rule in or out certain medical conditions and you may be released before all of your medical problems are known or treated.     Please arrange follow up with your primary care physician or speciality clinic within 2-5 days if your signs and symptoms have not resolved or worsen.     Patient can call our Referral Hotline at [phone] to make an appointment.      Please return here or go to the Emergency Department for any concerns or worsening of condition.  Signs of infection. These include a fever of 100.4°F (38°C) or higher, chills, cough, more sputum or change in color of sputum.  You are having so much trouble breathing that you can only say one or two words at a time.  You need to sit upright at all times to  be able to breathe and or cannot lie down.  You have trouble breathing when talking or sitting still.  You have a fever of 100.4°F (38°C) or higher or chills.  You have chest pain when you cough, have trouble breathing but can still talk in full sentences, or cough up blood.

## 2025-01-27 NOTE — LETTER
"  January 27, 2025      [facility] [address]  [place] [address]  Phone: [phone]  Fax: [phone]       Patient: Kadie Mccann   YOB: 1990  Date of Visit: 01/27/2025    To Whom It May Concern:    Arianna Mccann  was at [facility] on 01/27/2025. The patient may return to work/school on 1/28/25 with no restrictions. If you have any questions or concerns, or if I can be of further assistance, please do not hesitate to contact me.    Sincerely,      Alpaclifford Olvera PA-C (Jackie)       "

## 2025-04-28 ENCOUNTER — OFFICE VISIT (OUTPATIENT)
Dept: URGENT CARE | Facility: CLINIC | Age: 35
End: 2025-04-28
Payer: MEDICAID

## 2025-04-28 VITALS
HEIGHT: 57 IN | TEMPERATURE: 98 F | RESPIRATION RATE: 18 BRPM | DIASTOLIC BLOOD PRESSURE: 78 MMHG | HEART RATE: 93 BPM | OXYGEN SATURATION: 98 % | SYSTOLIC BLOOD PRESSURE: 116 MMHG | WEIGHT: 152 LBS | BODY MASS INDEX: 32.79 KG/M2

## 2025-04-28 DIAGNOSIS — M79.18 GLUTEAL PAIN: Primary | ICD-10-CM
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
## 2025-04-28 PROCEDURE — 99213 OFFICE O/P EST LOW 20 MIN: CPT | Mod: S$GLB,,,

## 2025-04-28 PROCEDURE — 72220 X-RAY EXAM SACRUM TAILBONE: CPT | Mod: FY,S$GLB,, | Performed by: RADIOLOGY

## 2025-04-28 RX ORDER — KETOROLAC TROMETHAMINE 10 MG/1
10 TABLET, FILM COATED ORAL EVERY 6 HOURS
Qty: 20 TABLET | Refills: 0 | Status: SHIPPED | OUTPATIENT
Start: 2025-04-28 | End: 2025-05-03

## 2025-04-28 RX ORDER — KETOROLAC TROMETHAMINE 30 MG/ML
30 INJECTION, SOLUTION INTRAMUSCULAR; INTRAVENOUS
Status: COMPLETED | OUTPATIENT
Start: 2025-04-28 | End: 2025-04-28

## 2025-04-28 RX ORDER — DROSPIRENONE AND ETHINYL ESTRADIOL 0.02-3(28)
1 KIT ORAL
COMMUNITY
Start: 2025-03-13 | End: 2026-03-13

## 2025-04-28 RX ORDER — METHOCARBAMOL 500 MG/1
500 TABLET, FILM COATED ORAL 4 TIMES DAILY
Qty: 40 TABLET | Refills: 0 | Status: SHIPPED | OUTPATIENT
Start: 2025-04-28 | End: 2025-05-08

## 2025-04-28 RX ADMIN — KETOROLAC TROMETHAMINE 30 MG: 30 INJECTION, SOLUTION INTRAMUSCULAR; INTRAVENOUS at 06:04

## 2025-04-28 NOTE — PATIENT INSTRUCTIONS
Tail bone pain: Robaxin every 6 hours as needed. May cause drowsiness, take first dose at night. Toradol every 6 hours as needed. May alternate with tylenol every 4-6 hours as needed. Do not take with any other NSAID (Ibuprofen, Naproxen, Advil, Aleve)  Heat therapy as tolerated, 2-3 times daily, 20-30 minute intervals as needed  Please drink plenty of fluids.  Please get plenty of rest.  Please return here or go to the Emergency Department for any concerns or worsening of condition.  If you were prescribed a narcotic medication, do not drive or operate heavy equipment or machinery while taking these medications.  If you were not prescribed an anti-inflammatory medication, and if you do not have any history of stomach/intestinal ulcers, or kidney disease, or are not taking a blood thinner such as Coumadin, Plavix, Pradaxa, Eloquis, or Xaralta for example, it is OK to take over the counter Ibuprofen or Advil or Motrin or Aleve as directed.  Do not take these medications on an empty stomach.  Rest, ice, compression and elevation to the affected joint or limb as needed.  Please follow up with your primary care doctor or specialist as needed.    If you  smoke, please stop smoking.

## 2025-04-28 NOTE — PROGRESS NOTES
"Subjective:      Patient ID: Kadie Mccann is a 34 y.o. female.    Vitals:  height is 4' 9" (1.448 m) and weight is 68.9 kg (152 lb). Her temperature is 98 °F (36.7 °C). Her blood pressure is 116/78 and her pulse is 93. Her respiration is 18 and oxygen saturation is 98%.     Chief Complaint: Fall    Pt is a 34 y.o. female presenting with tailbone pain.  Onset of symptoms was today after slipping on wet floor at [employer]. Denies any numbness or tingling down legs, incontinence, inability to walk or bear weight. Pt reports using no OTC tx.      Fall  The accident occurred 1 to 3 hours ago. The fall occurred while walking. She landed on Hard floor. There was no blood loss. Point of impact: buttocks. Pain location: buttocks. The pain is at a severity of 10/10. The pain is severe. The symptoms are aggravated by movement. Pertinent negatives include no abdominal pain, bowel incontinence, fever, headaches, hearing loss, hematuria, loss of consciousness, nausea, numbness, tingling, visual change or vomiting. She has tried nothing for the symptoms.       Constitution: Negative for activity change, appetite change, chills and fever.   HENT:  Negative for ear pain, congestion, postnasal drip, sinus pain, sinus pressure and sore throat.    Neck: Negative for neck pain.   Cardiovascular:  Negative for chest pain and sob on exertion.   Eyes:  Negative for eye trauma, eye discharge, eye itching, eye redness, photophobia and blurred vision.   Respiratory:  Negative for cough, shortness of breath, wheezing and asthma.    Gastrointestinal:  Negative for abdominal pain, nausea, vomiting, constipation, diarrhea and bowel incontinence.   Genitourinary:  Negative for dysuria, frequency, urgency, urine decreased and hematuria.   Musculoskeletal:  Negative for pain and muscle ache.   Skin:  Negative for color change, rash and hives.   Allergic/Immunologic: Negative for seasonal allergies, asthma, hives and sneezing.   Neurological:  " Negative for dizziness, light-headedness, headaches, altered mental status, loss of consciousness and numbness.   Psychiatric/Behavioral:  Negative for altered mental status and confusion.       Objective:     Physical Exam   Constitutional: She is oriented to person, place, and time. She appears well-developed. She is cooperative.      Comments:Pt sitting erect on examination table. No acute respiratory distress, no use of accessory muscles, no notice of nasal flaring.        HENT:   Head: Normocephalic and atraumatic.   Ears:   Right Ear: Hearing and external ear normal.   Left Ear: Hearing and external ear normal.   Nose: Nose normal. No mucosal edema or nasal deformity. No epistaxis. Right sinus exhibits no maxillary sinus tenderness and no frontal sinus tenderness. Left sinus exhibits no maxillary sinus tenderness and no frontal sinus tenderness.   Mouth/Throat: Uvula is midline, oropharynx is clear and moist and mucous membranes are normal. No trismus in the jaw. Normal dentition. No uvula swelling.   Eyes: Conjunctivae and lids are normal.   Neck: Trachea normal and phonation normal. Neck supple.   Cardiovascular: Normal rate, regular rhythm, normal heart sounds and normal pulses.   Pulmonary/Chest: Effort normal.   Abdominal: Normal appearance.   Musculoskeletal: Normal range of motion.         General: Normal range of motion.   Neurological: She is alert and oriented to person, place, and time. She exhibits normal muscle tone.   Skin: Skin is warm, dry and intact.   Psychiatric: Her speech is normal and behavior is normal. Judgment and thought content normal.   Nursing note and vitals reviewed.  XR SACRUM AND COCCYX  Result Date: 4/28/2025  EXAMINATION: XR SACRUM AND COCCYX CLINICAL HISTORY: Unspecified fall, initial encounter TECHNIQUE: AP and lateral view of the sacrum and coccyx COMPARISON: Lumbar spine 03/15/2023 FINDINGS: Two views of the sacrum and coccyx demonstrate no definite acute fracture.  If  pain is out of proportion to the radiological findings, consider cross-sectional imaging when clinically appropriate.     As above described Electronically signed by: Lorena Israel Date:    04/28/2025 Time:    19:07        Assessment:     1. Gluteal pain    2. Fall, initial encounter        Plan:   I have reviewed the patient chart and pertinent past imaging/labs.      Gluteal pain  -     methocarbamoL (ROBAXIN) 500 MG Tab; Take 1 tablet (500 mg total) by mouth 4 (four) times daily. for 10 days  Dispense: 40 tablet; Refill: 0  -     ketorolac injection 30 mg  -     ketorolac (TORADOL) 10 mg tablet; Take 1 tablet (10 mg total) by mouth every 6 (six) hours. for 5 days  Dispense: 20 tablet; Refill: 0    Fall, initial encounter  -     XR SACRUM AND COCCYX; Future; Expected date: 04/28/2025

## 2025-04-30 ENCOUNTER — TELEPHONE (OUTPATIENT)
Dept: URGENT CARE | Facility: CLINIC | Age: 35
End: 2025-04-30
Payer: MEDICAID

## 2025-04-30 NOTE — TELEPHONE ENCOUNTER
Tried calling patient back. She has questions regarding her visit and medications. Left voicemail for patient to return call to clinic.